# Patient Record
Sex: MALE | Race: WHITE | NOT HISPANIC OR LATINO | URBAN - METROPOLITAN AREA
[De-identification: names, ages, dates, MRNs, and addresses within clinical notes are randomized per-mention and may not be internally consistent; named-entity substitution may affect disease eponyms.]

---

## 2018-01-01 ENCOUNTER — INPATIENT (INPATIENT)
Facility: HOSPITAL | Age: 73
LOS: 11 days | Discharge: HOSPICE | DRG: 377 | End: 2018-10-17
Attending: FAMILY MEDICINE | Admitting: FAMILY MEDICINE
Payer: MEDICARE

## 2018-01-01 ENCOUNTER — EMERGENCY (EMERGENCY)
Facility: HOSPITAL | Age: 73
LOS: 1 days | Discharge: HOME | End: 2018-01-01
Attending: EMERGENCY MEDICINE | Admitting: EMERGENCY MEDICINE
Payer: MEDICARE

## 2018-01-01 VITALS — OXYGEN SATURATION: 90 %

## 2018-01-01 VITALS
OXYGEN SATURATION: 99 % | DIASTOLIC BLOOD PRESSURE: 66 MMHG | RESPIRATION RATE: 18 BRPM | TEMPERATURE: 98 F | HEART RATE: 98 BPM | SYSTOLIC BLOOD PRESSURE: 144 MMHG

## 2018-01-01 VITALS
OXYGEN SATURATION: 94 % | TEMPERATURE: 97 F | DIASTOLIC BLOOD PRESSURE: 71 MMHG | RESPIRATION RATE: 20 BRPM | HEIGHT: 68 IN | SYSTOLIC BLOOD PRESSURE: 133 MMHG | HEART RATE: 83 BPM | WEIGHT: 315 LBS

## 2018-01-01 VITALS
RESPIRATION RATE: 25 BRPM | DIASTOLIC BLOOD PRESSURE: 85 MMHG | OXYGEN SATURATION: 100 % | SYSTOLIC BLOOD PRESSURE: 138 MMHG | TEMPERATURE: 98 F | WEIGHT: 315 LBS | HEIGHT: 67 IN | HEART RATE: 69 BPM

## 2018-01-01 DIAGNOSIS — D61.818 OTHER PANCYTOPENIA: ICD-10-CM

## 2018-01-01 DIAGNOSIS — M05.9 RHEUMATOID ARTHRITIS WITH RHEUMATOID FACTOR, UNSPECIFIED: ICD-10-CM

## 2018-01-01 DIAGNOSIS — K92.2 GASTROINTESTINAL HEMORRHAGE, UNSPECIFIED: ICD-10-CM

## 2018-01-01 DIAGNOSIS — L89.320 PRESSURE ULCER OF LEFT BUTTOCK, UNSTAGEABLE: ICD-10-CM

## 2018-01-01 DIAGNOSIS — I50.9 HEART FAILURE, UNSPECIFIED: ICD-10-CM

## 2018-01-01 DIAGNOSIS — D50.8 OTHER IRON DEFICIENCY ANEMIAS: ICD-10-CM

## 2018-01-01 DIAGNOSIS — J22 UNSPECIFIED ACUTE LOWER RESPIRATORY INFECTION: ICD-10-CM

## 2018-01-01 DIAGNOSIS — N17.9 ACUTE KIDNEY FAILURE, UNSPECIFIED: ICD-10-CM

## 2018-01-01 DIAGNOSIS — D64.9 ANEMIA, UNSPECIFIED: ICD-10-CM

## 2018-01-01 DIAGNOSIS — Z51.5 ENCOUNTER FOR PALLIATIVE CARE: ICD-10-CM

## 2018-01-01 DIAGNOSIS — R06.02 SHORTNESS OF BREATH: ICD-10-CM

## 2018-01-01 DIAGNOSIS — R45.1 RESTLESSNESS AND AGITATION: ICD-10-CM

## 2018-01-01 DIAGNOSIS — J44.9 CHRONIC OBSTRUCTIVE PULMONARY DISEASE, UNSPECIFIED: ICD-10-CM

## 2018-01-01 DIAGNOSIS — G93.40 ENCEPHALOPATHY, UNSPECIFIED: ICD-10-CM

## 2018-01-01 DIAGNOSIS — Z71.89 OTHER SPECIFIED COUNSELING: ICD-10-CM

## 2018-01-01 DIAGNOSIS — G93.41 METABOLIC ENCEPHALOPATHY: ICD-10-CM

## 2018-01-01 DIAGNOSIS — E66.01 MORBID (SEVERE) OBESITY DUE TO EXCESS CALORIES: ICD-10-CM

## 2018-01-01 DIAGNOSIS — R71.0 PRECIPITOUS DROP IN HEMATOCRIT: ICD-10-CM

## 2018-01-01 DIAGNOSIS — I50.32 CHRONIC DIASTOLIC (CONGESTIVE) HEART FAILURE: ICD-10-CM

## 2018-01-01 DIAGNOSIS — N18.3 CHRONIC KIDNEY DISEASE, STAGE 3 (MODERATE): ICD-10-CM

## 2018-01-01 DIAGNOSIS — I48.2 CHRONIC ATRIAL FIBRILLATION: ICD-10-CM

## 2018-01-01 DIAGNOSIS — L89.310 PRESSURE ULCER OF RIGHT BUTTOCK, UNSTAGEABLE: ICD-10-CM

## 2018-01-01 DIAGNOSIS — J18.9 PNEUMONIA, UNSPECIFIED ORGANISM: ICD-10-CM

## 2018-01-01 DIAGNOSIS — K59.1 FUNCTIONAL DIARRHEA: ICD-10-CM

## 2018-01-01 DIAGNOSIS — J96.01 ACUTE RESPIRATORY FAILURE WITH HYPOXIA: ICD-10-CM

## 2018-01-01 DIAGNOSIS — R10.9 UNSPECIFIED ABDOMINAL PAIN: ICD-10-CM

## 2018-01-01 DIAGNOSIS — R19.7 DIARRHEA, UNSPECIFIED: ICD-10-CM

## 2018-01-01 LAB
ALBUMIN SERPL ELPH-MCNC: 2.3 G/DL — LOW (ref 3.3–5)
ALBUMIN SERPL ELPH-MCNC: 2.7 G/DL — LOW (ref 3.3–5)
ALBUMIN SERPL ELPH-MCNC: 3.2 G/DL — LOW (ref 3.3–5)
ALP SERPL-CCNC: 81 U/L — SIGNIFICANT CHANGE UP (ref 40–120)
ALP SERPL-CCNC: 99 U/L — SIGNIFICANT CHANGE UP (ref 30–120)
ALP SERPL-CCNC: 99 U/L — SIGNIFICANT CHANGE UP (ref 40–120)
ALT FLD-CCNC: 17 U/L — SIGNIFICANT CHANGE UP (ref 12–78)
ALT FLD-CCNC: 19 U/L DA — SIGNIFICANT CHANGE UP (ref 10–60)
ALT FLD-CCNC: 27 U/L — SIGNIFICANT CHANGE UP (ref 12–78)
AMMONIA BLD-MCNC: 19 UMOL/L — SIGNIFICANT CHANGE UP (ref 11–32)
AMMONIA BLD-MCNC: 27 UMOL/L — SIGNIFICANT CHANGE UP (ref 11–32)
AMMONIA BLD-MCNC: 30 UMOL/L — SIGNIFICANT CHANGE UP (ref 11–32)
AMMONIA BLD-MCNC: 37 UMOL/L — HIGH (ref 11–32)
ANION GAP SERPL CALC-SCNC: 10 MMOL/L — SIGNIFICANT CHANGE UP (ref 5–17)
ANION GAP SERPL CALC-SCNC: 10 MMOL/L — SIGNIFICANT CHANGE UP (ref 5–17)
ANION GAP SERPL CALC-SCNC: 11 MMOL/L — SIGNIFICANT CHANGE UP (ref 5–17)
ANION GAP SERPL CALC-SCNC: 11 MMOL/L — SIGNIFICANT CHANGE UP (ref 5–17)
ANION GAP SERPL CALC-SCNC: 12 MMOL/L — SIGNIFICANT CHANGE UP (ref 5–17)
ANION GAP SERPL CALC-SCNC: 7 MMOL/L — SIGNIFICANT CHANGE UP (ref 5–17)
ANION GAP SERPL CALC-SCNC: 8 MMOL/L — SIGNIFICANT CHANGE UP (ref 5–17)
ANISOCYTOSIS BLD QL: SLIGHT — SIGNIFICANT CHANGE UP
APPEARANCE UR: ABNORMAL
APPEARANCE UR: CLEAR — SIGNIFICANT CHANGE UP
APTT BLD: 31.9 SEC — SIGNIFICANT CHANGE UP (ref 27.5–37.4)
APTT BLD: 32.4 SEC — SIGNIFICANT CHANGE UP (ref 27.5–37.4)
APTT BLD: 33.5 SEC — SIGNIFICANT CHANGE UP (ref 27.5–37.4)
APTT BLD: 35.3 SEC — SIGNIFICANT CHANGE UP (ref 27.5–37.4)
APTT BLD: 45.9 SEC — HIGH (ref 27.5–37.4)
AST SERPL-CCNC: 18 U/L — SIGNIFICANT CHANGE UP (ref 15–37)
AST SERPL-CCNC: 23 U/L — SIGNIFICANT CHANGE UP (ref 10–40)
AST SERPL-CCNC: 53 U/L — HIGH (ref 15–37)
BACTERIA # UR AUTO: ABNORMAL
BACTERIA # UR AUTO: ABNORMAL
BASE EXCESS BLDA CALC-SCNC: -5.5 MMOL/L — LOW (ref -2–2)
BASE EXCESS BLDA CALC-SCNC: -7.8 MMOL/L — LOW (ref -2–2)
BASOPHILS # BLD AUTO: 0 K/UL — SIGNIFICANT CHANGE UP (ref 0–0.2)
BASOPHILS # BLD AUTO: 0.02 K/UL — SIGNIFICANT CHANGE UP (ref 0–0.2)
BASOPHILS NFR BLD AUTO: 0 % — SIGNIFICANT CHANGE UP (ref 0–2)
BASOPHILS NFR BLD AUTO: 0.2 % — SIGNIFICANT CHANGE UP (ref 0–2)
BILIRUB SERPL-MCNC: 0.6 MG/DL — SIGNIFICANT CHANGE UP (ref 0.2–1.2)
BILIRUB SERPL-MCNC: 0.8 MG/DL — SIGNIFICANT CHANGE UP (ref 0.2–1.2)
BILIRUB SERPL-MCNC: 0.9 MG/DL — SIGNIFICANT CHANGE UP (ref 0.2–1.2)
BILIRUB UR-MCNC: ABNORMAL
BILIRUB UR-MCNC: ABNORMAL
BLD GP AB SCN SERPL QL: SIGNIFICANT CHANGE UP
BLOOD GAS COMMENTS ARTERIAL: SIGNIFICANT CHANGE UP
BUN SERPL-MCNC: 25 MG/DL — HIGH (ref 7–23)
BUN SERPL-MCNC: 26 MG/DL — HIGH (ref 7–23)
BUN SERPL-MCNC: 26 MG/DL — HIGH (ref 7–23)
BUN SERPL-MCNC: 29 MG/DL — HIGH (ref 7–23)
BUN SERPL-MCNC: 30 MG/DL — HIGH (ref 7–23)
BUN SERPL-MCNC: 35 MG/DL — HIGH (ref 7–23)
BUN SERPL-MCNC: 38 MG/DL — HIGH (ref 7–23)
C DIFF BY PCR RESULT: SIGNIFICANT CHANGE UP
C DIFF TOX GENS STL QL NAA+PROBE: SIGNIFICANT CHANGE UP
CALCIUM SERPL-MCNC: 7 MG/DL — LOW (ref 8.5–10.1)
CALCIUM SERPL-MCNC: 7.3 MG/DL — LOW (ref 8.5–10.1)
CALCIUM SERPL-MCNC: 7.6 MG/DL — LOW (ref 8.5–10.1)
CALCIUM SERPL-MCNC: 7.7 MG/DL — LOW (ref 8.5–10.1)
CALCIUM SERPL-MCNC: 7.9 MG/DL — LOW (ref 8.5–10.1)
CALCIUM SERPL-MCNC: 7.9 MG/DL — LOW (ref 8.5–10.1)
CALCIUM SERPL-MCNC: 8.9 MG/DL — SIGNIFICANT CHANGE UP (ref 8.4–10.5)
CHLORIDE SERPL-SCNC: 103 MMOL/L — SIGNIFICANT CHANGE UP (ref 96–108)
CHLORIDE SERPL-SCNC: 111 MMOL/L — HIGH (ref 96–108)
CHLORIDE SERPL-SCNC: 115 MMOL/L — HIGH (ref 96–108)
CHLORIDE SERPL-SCNC: 115 MMOL/L — HIGH (ref 96–108)
CHLORIDE SERPL-SCNC: 117 MMOL/L — HIGH (ref 96–108)
CHLORIDE SERPL-SCNC: 119 MMOL/L — HIGH (ref 96–108)
CHLORIDE SERPL-SCNC: 119 MMOL/L — HIGH (ref 96–108)
CK MB BLD-MCNC: 2 % — SIGNIFICANT CHANGE UP (ref 0–3.5)
CK MB BLD-MCNC: 2.2 % — SIGNIFICANT CHANGE UP (ref 0–3.5)
CK MB BLD-MCNC: 2.6 % — SIGNIFICANT CHANGE UP (ref 0–3.5)
CK MB BLD-MCNC: 3.2 % — SIGNIFICANT CHANGE UP (ref 0–3.5)
CK MB CFR SERPL CALC: 0.6 NG/ML — SIGNIFICANT CHANGE UP (ref 0–3.6)
CK MB CFR SERPL CALC: 1.2 NG/ML — SIGNIFICANT CHANGE UP (ref 0–3.6)
CK MB CFR SERPL CALC: 4.2 NG/ML — HIGH (ref 0–3.6)
CK MB CFR SERPL CALC: 4.4 NG/ML — HIGH (ref 0–3.6)
CK SERPL-CCNC: 130 U/L — SIGNIFICANT CHANGE UP (ref 26–308)
CK SERPL-CCNC: 167 U/L — SIGNIFICANT CHANGE UP (ref 26–308)
CK SERPL-CCNC: 27 U/L — LOW (ref 39–308)
CK SERPL-CCNC: 59 U/L — SIGNIFICANT CHANGE UP (ref 26–308)
CO2 SERPL-SCNC: 17 MMOL/L — LOW (ref 22–31)
CO2 SERPL-SCNC: 18 MMOL/L — LOW (ref 22–31)
CO2 SERPL-SCNC: 19 MMOL/L — LOW (ref 22–31)
CO2 SERPL-SCNC: 19 MMOL/L — LOW (ref 22–31)
CO2 SERPL-SCNC: 22 MMOL/L — SIGNIFICANT CHANGE UP (ref 22–31)
CO2 SERPL-SCNC: 23 MMOL/L — SIGNIFICANT CHANGE UP (ref 22–31)
CO2 SERPL-SCNC: 24 MMOL/L — SIGNIFICANT CHANGE UP (ref 22–31)
COLOR SPEC: YELLOW — SIGNIFICANT CHANGE UP
COLOR SPEC: YELLOW — SIGNIFICANT CHANGE UP
CREAT SERPL-MCNC: 1.1 MG/DL — SIGNIFICANT CHANGE UP (ref 0.5–1.3)
CREAT SERPL-MCNC: 1.3 MG/DL — SIGNIFICANT CHANGE UP (ref 0.5–1.3)
CREAT SERPL-MCNC: 1.37 MG/DL — HIGH (ref 0.5–1.3)
CREAT SERPL-MCNC: 1.4 MG/DL — HIGH (ref 0.5–1.3)
CREAT SERPL-MCNC: 1.6 MG/DL — HIGH (ref 0.5–1.3)
CRP SERPL-MCNC: 7.42 MG/DL — HIGH (ref 0–0.4)
CULTURE RESULTS: NO GROWTH — SIGNIFICANT CHANGE UP
CULTURE RESULTS: NO GROWTH — SIGNIFICANT CHANGE UP
CULTURE RESULTS: SIGNIFICANT CHANGE UP
CULTURE RESULTS: SIGNIFICANT CHANGE UP
D DIMER BLD IA.RAPID-MCNC: 178 NG/ML DDU — SIGNIFICANT CHANGE UP
DIFF PNL FLD: ABNORMAL
DIFF PNL FLD: ABNORMAL
ELLIPTOCYTES BLD QL SMEAR: SLIGHT — SIGNIFICANT CHANGE UP
EOSINOPHIL # BLD AUTO: 0 K/UL — SIGNIFICANT CHANGE UP (ref 0–0.5)
EOSINOPHIL # BLD AUTO: 0 K/UL — SIGNIFICANT CHANGE UP (ref 0–0.5)
EOSINOPHIL # BLD AUTO: 0.04 K/UL — SIGNIFICANT CHANGE UP (ref 0–0.5)
EOSINOPHIL # BLD AUTO: 0.04 K/UL — SIGNIFICANT CHANGE UP (ref 0–0.5)
EOSINOPHIL # BLD AUTO: 0.05 K/UL — SIGNIFICANT CHANGE UP (ref 0–0.5)
EOSINOPHIL NFR BLD AUTO: 0 % — SIGNIFICANT CHANGE UP (ref 0–6)
EOSINOPHIL NFR BLD AUTO: 0 % — SIGNIFICANT CHANGE UP (ref 0–6)
EOSINOPHIL NFR BLD AUTO: 0.6 % — SIGNIFICANT CHANGE UP (ref 0–6)
EOSINOPHIL NFR BLD AUTO: 0.9 % — SIGNIFICANT CHANGE UP (ref 0–6)
EOSINOPHIL NFR BLD AUTO: 1.4 % — SIGNIFICANT CHANGE UP (ref 0–6)
EPI CELLS # UR: SIGNIFICANT CHANGE UP
EPI CELLS # UR: SIGNIFICANT CHANGE UP
FERRITIN SERPL-MCNC: 427 NG/ML — HIGH (ref 30–400)
FOLATE SERPL-MCNC: <2 NG/ML — LOW
GLUCOSE SERPL-MCNC: 123 MG/DL — HIGH (ref 70–99)
GLUCOSE SERPL-MCNC: 135 MG/DL — HIGH (ref 70–99)
GLUCOSE SERPL-MCNC: 140 MG/DL — HIGH (ref 70–99)
GLUCOSE SERPL-MCNC: 148 MG/DL — HIGH (ref 70–99)
GLUCOSE SERPL-MCNC: 151 MG/DL — HIGH (ref 70–99)
GLUCOSE SERPL-MCNC: 161 MG/DL — HIGH (ref 70–99)
GLUCOSE SERPL-MCNC: 185 MG/DL — HIGH (ref 70–99)
GLUCOSE UR QL: NEGATIVE — SIGNIFICANT CHANGE UP
GLUCOSE UR QL: NEGATIVE — SIGNIFICANT CHANGE UP
GRAN CASTS # UR COMP ASSIST: ABNORMAL /LPF
HCO3 BLDA-SCNC: 18 MMOL/L — LOW (ref 23–27)
HCO3 BLDA-SCNC: 20 MMOL/L — LOW (ref 23–27)
HCT VFR BLD CALC: 21.8 % — LOW (ref 39–50)
HCT VFR BLD CALC: 21.8 % — LOW (ref 39–50)
HCT VFR BLD CALC: 22.6 % — LOW (ref 39–50)
HCT VFR BLD CALC: 23.4 % — LOW (ref 39–50)
HCT VFR BLD CALC: 23.8 % — LOW (ref 39–50)
HCT VFR BLD CALC: 24.5 % — LOW (ref 39–50)
HCT VFR BLD CALC: 25 % — LOW (ref 39–50)
HCT VFR BLD CALC: 25.2 % — LOW (ref 39–50)
HCT VFR BLD CALC: 26.3 % — LOW (ref 39–50)
HCT VFR BLD CALC: 29.2 % — LOW (ref 39–50)
HCT VFR BLD CALC: 29.4 % — LOW (ref 39–50)
HCT VFR BLD CALC: 35 % — LOW (ref 39–50)
HGB BLD-MCNC: 11.3 G/DL — LOW (ref 13–17)
HGB BLD-MCNC: 6.9 G/DL — CRITICAL LOW (ref 13–17)
HGB BLD-MCNC: 6.9 G/DL — CRITICAL LOW (ref 13–17)
HGB BLD-MCNC: 7.1 G/DL — LOW (ref 13–17)
HGB BLD-MCNC: 7.3 G/DL — LOW (ref 13–17)
HGB BLD-MCNC: 7.5 G/DL — LOW (ref 13–17)
HGB BLD-MCNC: 7.7 G/DL — LOW (ref 13–17)
HGB BLD-MCNC: 7.9 G/DL — LOW (ref 13–17)
HGB BLD-MCNC: 8.2 G/DL — LOW (ref 13–17)
HGB BLD-MCNC: 8.3 G/DL — LOW (ref 13–17)
HGB BLD-MCNC: 9.3 G/DL — LOW (ref 13–17)
HGB BLD-MCNC: 9.3 G/DL — LOW (ref 13–17)
HOROWITZ INDEX BLDA+IHG-RTO: 40 — SIGNIFICANT CHANGE UP
HOROWITZ INDEX BLDA+IHG-RTO: SIGNIFICANT CHANGE UP
HYALINE CASTS # UR AUTO: ABNORMAL /LPF
HYPOCHROMIA BLD QL: SLIGHT — SIGNIFICANT CHANGE UP
IMM GRANULOCYTES NFR BLD AUTO: 0.7 % — SIGNIFICANT CHANGE UP (ref 0–1.5)
IMM GRANULOCYTES NFR BLD AUTO: 1.1 % — SIGNIFICANT CHANGE UP (ref 0–1.5)
IMM GRANULOCYTES NFR BLD AUTO: 1.4 % — SIGNIFICANT CHANGE UP (ref 0–1.5)
IMM GRANULOCYTES NFR BLD AUTO: 1.8 % — HIGH (ref 0–1.5)
INR BLD: 2.12 RATIO — HIGH (ref 0.88–1.16)
INR BLD: 2.28 RATIO — HIGH (ref 0.88–1.16)
INR BLD: 2.33 RATIO — HIGH (ref 0.88–1.16)
INR BLD: 2.58 RATIO — HIGH (ref 0.88–1.16)
INR BLD: 2.69 RATIO — HIGH (ref 0.88–1.16)
INR BLD: 3.23 RATIO — HIGH (ref 0.88–1.16)
IRON SATN MFR SERPL: 135 UG/DL — SIGNIFICANT CHANGE UP (ref 45–165)
IRON SATN MFR SERPL: 46 % — SIGNIFICANT CHANGE UP (ref 16–55)
KETONES UR-MCNC: ABNORMAL
KETONES UR-MCNC: ABNORMAL
LACTATE SERPL-SCNC: 1.6 MMOL/L — SIGNIFICANT CHANGE UP (ref 0.7–2)
LACTATE SERPL-SCNC: 2.3 MMOL/L — HIGH (ref 0.7–2)
LEUKOCYTE ESTERASE UR-ACNC: ABNORMAL
LEUKOCYTE ESTERASE UR-ACNC: ABNORMAL
LG PLATELETS BLD QL AUTO: SLIGHT — SIGNIFICANT CHANGE UP
LIDOCAIN IGE QN: 64 U/L — LOW (ref 73–393)
LYMPHOCYTES # BLD AUTO: 0.47 K/UL — LOW (ref 1–3.3)
LYMPHOCYTES # BLD AUTO: 0.49 K/UL — LOW (ref 1–3.3)
LYMPHOCYTES # BLD AUTO: 0.57 K/UL — LOW (ref 1–3.3)
LYMPHOCYTES # BLD AUTO: 0.57 K/UL — LOW (ref 1–3.3)
LYMPHOCYTES # BLD AUTO: 0.88 K/UL — LOW (ref 1–3.3)
LYMPHOCYTES # BLD AUTO: 10 % — LOW (ref 13–44)
LYMPHOCYTES # BLD AUTO: 10.8 % — LOW (ref 13–44)
LYMPHOCYTES # BLD AUTO: 16.4 % — SIGNIFICANT CHANGE UP (ref 13–44)
LYMPHOCYTES # BLD AUTO: 4.7 % — LOW (ref 13–44)
LYMPHOCYTES # BLD AUTO: 8.8 % — LOW (ref 13–44)
MACROCYTES BLD QL: SLIGHT — SIGNIFICANT CHANGE UP
MAGNESIUM SERPL-MCNC: 1.9 MG/DL — SIGNIFICANT CHANGE UP (ref 1.6–2.6)
MAGNESIUM SERPL-MCNC: 2.1 MG/DL — SIGNIFICANT CHANGE UP (ref 1.6–2.6)
MANUAL SMEAR VERIFICATION: SIGNIFICANT CHANGE UP
MCHC RBC-ENTMCNC: 28.2 PG — SIGNIFICANT CHANGE UP (ref 27–34)
MCHC RBC-ENTMCNC: 28.2 PG — SIGNIFICANT CHANGE UP (ref 27–34)
MCHC RBC-ENTMCNC: 28.5 PG — SIGNIFICANT CHANGE UP (ref 27–34)
MCHC RBC-ENTMCNC: 28.6 PG — SIGNIFICANT CHANGE UP (ref 27–34)
MCHC RBC-ENTMCNC: 28.9 PG — SIGNIFICANT CHANGE UP (ref 27–34)
MCHC RBC-ENTMCNC: 29.2 PG — SIGNIFICANT CHANGE UP (ref 27–34)
MCHC RBC-ENTMCNC: 29.3 PG — SIGNIFICANT CHANGE UP (ref 27–34)
MCHC RBC-ENTMCNC: 31.4 GM/DL — LOW (ref 32–36)
MCHC RBC-ENTMCNC: 31.6 GM/DL — LOW (ref 32–36)
MCHC RBC-ENTMCNC: 31.6 GM/DL — LOW (ref 32–36)
MCHC RBC-ENTMCNC: 31.7 GM/DL — LOW (ref 32–36)
MCHC RBC-ENTMCNC: 31.8 GM/DL — LOW (ref 32–36)
MCHC RBC-ENTMCNC: 32.3 GM/DL — SIGNIFICANT CHANGE UP (ref 32–36)
MCHC RBC-ENTMCNC: 32.5 GM/DL — SIGNIFICANT CHANGE UP (ref 32–36)
MCV RBC AUTO: 88.5 FL — SIGNIFICANT CHANGE UP (ref 80–100)
MCV RBC AUTO: 89.1 FL — SIGNIFICANT CHANGE UP (ref 80–100)
MCV RBC AUTO: 89.5 FL — SIGNIFICANT CHANGE UP (ref 80–100)
MCV RBC AUTO: 89.7 FL — SIGNIFICANT CHANGE UP (ref 80–100)
MCV RBC AUTO: 90.3 FL — SIGNIFICANT CHANGE UP (ref 80–100)
MCV RBC AUTO: 90.5 FL — SIGNIFICANT CHANGE UP (ref 80–100)
MCV RBC AUTO: 93.2 FL — SIGNIFICANT CHANGE UP (ref 80–100)
MONOCYTES # BLD AUTO: 0.11 K/UL — SIGNIFICANT CHANGE UP (ref 0–0.9)
MONOCYTES # BLD AUTO: 0.11 K/UL — SIGNIFICANT CHANGE UP (ref 0–0.9)
MONOCYTES # BLD AUTO: 0.12 K/UL — SIGNIFICANT CHANGE UP (ref 0–0.9)
MONOCYTES # BLD AUTO: 0.14 K/UL — SIGNIFICANT CHANGE UP (ref 0–0.9)
MONOCYTES # BLD AUTO: 0.35 K/UL — SIGNIFICANT CHANGE UP (ref 0–0.9)
MONOCYTES NFR BLD AUTO: 1.2 % — LOW (ref 2–14)
MONOCYTES NFR BLD AUTO: 1.7 % — LOW (ref 2–14)
MONOCYTES NFR BLD AUTO: 2.5 % — SIGNIFICANT CHANGE UP (ref 2–14)
MONOCYTES NFR BLD AUTO: 4 % — SIGNIFICANT CHANGE UP (ref 2–14)
MONOCYTES NFR BLD AUTO: 4 % — SIGNIFICANT CHANGE UP (ref 2–14)
MRSA PCR RESULT.: SIGNIFICANT CHANGE UP
NEUTROPHILS # BLD AUTO: 2.66 K/UL — SIGNIFICANT CHANGE UP (ref 1.8–7.4)
NEUTROPHILS # BLD AUTO: 3.67 K/UL — SIGNIFICANT CHANGE UP (ref 1.8–7.4)
NEUTROPHILS # BLD AUTO: 5.71 K/UL — SIGNIFICANT CHANGE UP (ref 1.8–7.4)
NEUTROPHILS # BLD AUTO: 7.57 K/UL — HIGH (ref 1.8–7.4)
NEUTROPHILS # BLD AUTO: 9.66 K/UL — HIGH (ref 1.8–7.4)
NEUTROPHILS NFR BLD AUTO: 76.8 % — SIGNIFICANT CHANGE UP (ref 43–77)
NEUTROPHILS NFR BLD AUTO: 84 % — HIGH (ref 43–77)
NEUTROPHILS NFR BLD AUTO: 86 % — HIGH (ref 43–77)
NEUTROPHILS NFR BLD AUTO: 87.8 % — HIGH (ref 43–77)
NEUTROPHILS NFR BLD AUTO: 93.2 % — HIGH (ref 43–77)
NEUTS HYPERSEG # BLD: SIGNIFICANT CHANGE UP
NITRITE UR-MCNC: NEGATIVE — SIGNIFICANT CHANGE UP
NITRITE UR-MCNC: NEGATIVE — SIGNIFICANT CHANGE UP
NRBC # BLD: 0 /100 WBCS — SIGNIFICANT CHANGE UP (ref 0–0)
NRBC # BLD: 1 /100 WBCS — HIGH (ref 0–0)
NRBC # BLD: 3 — SIGNIFICANT CHANGE UP
NRBC # BLD: SIGNIFICANT CHANGE UP /100 WBCS (ref 0–0)
NT-PROBNP SERPL-SCNC: HIGH PG/ML (ref 0–125)
OB PNL STL: NEGATIVE — SIGNIFICANT CHANGE UP
OB PNL STL: POSITIVE
PCO2 BLDA: 28 MMHG — LOW (ref 32–46)
PCO2 BLDA: 37 MMHG — SIGNIFICANT CHANGE UP (ref 32–46)
PH BLDA: 7.34 — LOW (ref 7.35–7.45)
PH BLDA: 7.38 — SIGNIFICANT CHANGE UP (ref 7.35–7.45)
PH UR: 5 — SIGNIFICANT CHANGE UP (ref 5–8)
PH UR: 5 — SIGNIFICANT CHANGE UP (ref 5–8)
PLAT MORPH BLD: NORMAL — SIGNIFICANT CHANGE UP
PLATELET # BLD AUTO: 106 K/UL — LOW (ref 150–400)
PLATELET # BLD AUTO: 126 K/UL — LOW (ref 150–400)
PLATELET # BLD AUTO: 182 K/UL — SIGNIFICANT CHANGE UP (ref 150–400)
PLATELET # BLD AUTO: 212 K/UL — SIGNIFICANT CHANGE UP (ref 150–400)
PLATELET # BLD AUTO: 232 K/UL — SIGNIFICANT CHANGE UP (ref 150–400)
PLATELET # BLD AUTO: 84 K/UL — LOW (ref 150–400)
PLATELET # BLD AUTO: 95 K/UL — LOW (ref 150–400)
PO2 BLDA: 77 MMHG — SIGNIFICANT CHANGE UP (ref 74–108)
PO2 BLDA: 89 MMHG — SIGNIFICANT CHANGE UP (ref 74–108)
POIKILOCYTOSIS BLD QL AUTO: SLIGHT — SIGNIFICANT CHANGE UP
POTASSIUM SERPL-MCNC: 3.9 MMOL/L — SIGNIFICANT CHANGE UP (ref 3.5–5.3)
POTASSIUM SERPL-MCNC: 4.1 MMOL/L — SIGNIFICANT CHANGE UP (ref 3.5–5.3)
POTASSIUM SERPL-MCNC: 4.4 MMOL/L — SIGNIFICANT CHANGE UP (ref 3.5–5.3)
POTASSIUM SERPL-MCNC: 4.7 MMOL/L — SIGNIFICANT CHANGE UP (ref 3.5–5.3)
POTASSIUM SERPL-MCNC: 5.2 MMOL/L — SIGNIFICANT CHANGE UP (ref 3.5–5.3)
POTASSIUM SERPL-SCNC: 3.9 MMOL/L — SIGNIFICANT CHANGE UP (ref 3.5–5.3)
POTASSIUM SERPL-SCNC: 4.1 MMOL/L — SIGNIFICANT CHANGE UP (ref 3.5–5.3)
POTASSIUM SERPL-SCNC: 4.4 MMOL/L — SIGNIFICANT CHANGE UP (ref 3.5–5.3)
POTASSIUM SERPL-SCNC: 4.7 MMOL/L — SIGNIFICANT CHANGE UP (ref 3.5–5.3)
POTASSIUM SERPL-SCNC: 5.2 MMOL/L — SIGNIFICANT CHANGE UP (ref 3.5–5.3)
PROT SERPL-MCNC: 6.2 G/DL — SIGNIFICANT CHANGE UP (ref 6–8.3)
PROT SERPL-MCNC: 6.9 G/DL — SIGNIFICANT CHANGE UP (ref 6–8.3)
PROT SERPL-MCNC: 7.3 G/DL — SIGNIFICANT CHANGE UP (ref 6–8.3)
PROT UR-MCNC: 25 MG/DL
PROT UR-MCNC: 75 MG/DL
PROTHROM AB SERPL-ACNC: 23.5 SEC — HIGH (ref 9.8–12.7)
PROTHROM AB SERPL-ACNC: 25.3 SEC — HIGH (ref 9.8–12.7)
PROTHROM AB SERPL-ACNC: 25.8 SEC — HIGH (ref 9.8–12.7)
PROTHROM AB SERPL-ACNC: 28.7 SEC — HIGH (ref 9.8–12.7)
PROTHROM AB SERPL-ACNC: 29.9 SEC — HIGH (ref 9.8–12.7)
PROTHROM AB SERPL-ACNC: 36.1 SEC — HIGH (ref 9.8–12.7)
RBC # BLD: 2.41 M/UL — LOW (ref 4.2–5.8)
RBC # BLD: 2.63 M/UL — LOW (ref 4.2–5.8)
RBC # BLD: 2.77 M/UL — LOW (ref 4.2–5.8)
RBC # BLD: 2.81 M/UL — LOW (ref 4.2–5.8)
RBC # BLD: 3.3 M/UL — LOW (ref 4.2–5.8)
RBC # BLD: 3.3 M/UL — LOW (ref 4.2–5.8)
RBC # BLD: 3.91 M/UL — LOW (ref 4.2–5.8)
RBC # FLD: 19.9 % — HIGH (ref 10.3–14.5)
RBC # FLD: 20.5 % — HIGH (ref 10.3–14.5)
RBC # FLD: 20.6 % — HIGH (ref 10.3–14.5)
RBC # FLD: 21.3 % — HIGH (ref 10.3–14.5)
RBC # FLD: 21.3 % — HIGH (ref 10.3–14.5)
RBC # FLD: 21.4 % — HIGH (ref 10.3–14.5)
RBC # FLD: 21.8 % — HIGH (ref 10.3–14.5)
RBC BLD AUTO: ABNORMAL
RBC CASTS # UR COMP ASSIST: ABNORMAL /HPF (ref 0–4)
RBC CASTS # UR COMP ASSIST: ABNORMAL /HPF (ref 0–4)
S AUREUS DNA NOSE QL NAA+PROBE: DETECTED
SAO2 % BLDA: 94 % — SIGNIFICANT CHANGE UP (ref 92–96)
SAO2 % BLDA: 97 % — HIGH (ref 92–96)
SODIUM SERPL-SCNC: 138 MMOL/L — SIGNIFICANT CHANGE UP (ref 135–145)
SODIUM SERPL-SCNC: 140 MMOL/L — SIGNIFICANT CHANGE UP (ref 135–145)
SODIUM SERPL-SCNC: 144 MMOL/L — SIGNIFICANT CHANGE UP (ref 135–145)
SODIUM SERPL-SCNC: 144 MMOL/L — SIGNIFICANT CHANGE UP (ref 135–145)
SODIUM SERPL-SCNC: 146 MMOL/L — HIGH (ref 135–145)
SODIUM SERPL-SCNC: 149 MMOL/L — HIGH (ref 135–145)
SODIUM SERPL-SCNC: 149 MMOL/L — HIGH (ref 135–145)
SP GR SPEC: 1.01 — SIGNIFICANT CHANGE UP (ref 1.01–1.02)
SP GR SPEC: 1.01 — SIGNIFICANT CHANGE UP (ref 1.01–1.02)
SPECIMEN SOURCE: SIGNIFICANT CHANGE UP
T3 SERPL-MCNC: 44 NG/DL — LOW (ref 80–200)
T4 AB SER-ACNC: 5 UG/DL — SIGNIFICANT CHANGE UP (ref 4.6–12)
TIBC SERPL-MCNC: 296 UG/DL — SIGNIFICANT CHANGE UP (ref 220–430)
TROPONIN I SERPL-MCNC: 0 NG/ML — LOW (ref 0.02–0.06)
TROPONIN I SERPL-MCNC: <.015 NG/ML — SIGNIFICANT CHANGE UP (ref 0.01–0.04)
TSH SERPL-MCNC: 3.1 UIU/ML — SIGNIFICANT CHANGE UP (ref 0.36–3.74)
TSH SERPL-MCNC: 3.27 UIU/ML — SIGNIFICANT CHANGE UP (ref 0.36–3.74)
UIBC SERPL-MCNC: 161 UG/DL — SIGNIFICANT CHANGE UP (ref 110–370)
UROBILINOGEN FLD QL: NEGATIVE — SIGNIFICANT CHANGE UP
UROBILINOGEN FLD QL: NEGATIVE — SIGNIFICANT CHANGE UP
VIT B12 SERPL-MCNC: 198 PG/ML — LOW (ref 232–1245)
WBC # BLD: 10.36 K/UL — SIGNIFICANT CHANGE UP (ref 3.8–10.5)
WBC # BLD: 3.47 K/UL — LOW (ref 3.8–10.5)
WBC # BLD: 4.37 K/UL — SIGNIFICANT CHANGE UP (ref 3.8–10.5)
WBC # BLD: 4.47 K/UL — SIGNIFICANT CHANGE UP (ref 3.8–10.5)
WBC # BLD: 6.5 K/UL — SIGNIFICANT CHANGE UP (ref 3.8–10.5)
WBC # BLD: 8.62 K/UL — SIGNIFICANT CHANGE UP (ref 3.8–10.5)
WBC # BLD: 8.8 K/UL — SIGNIFICANT CHANGE UP (ref 3.8–10.5)
WBC # FLD AUTO: 10.36 K/UL — SIGNIFICANT CHANGE UP (ref 3.8–10.5)
WBC # FLD AUTO: 3.47 K/UL — LOW (ref 3.8–10.5)
WBC # FLD AUTO: 4.37 K/UL — SIGNIFICANT CHANGE UP (ref 3.8–10.5)
WBC # FLD AUTO: 4.47 K/UL — SIGNIFICANT CHANGE UP (ref 3.8–10.5)
WBC # FLD AUTO: 6.5 K/UL — SIGNIFICANT CHANGE UP (ref 3.8–10.5)
WBC # FLD AUTO: 8.62 K/UL — SIGNIFICANT CHANGE UP (ref 3.8–10.5)
WBC # FLD AUTO: 8.8 K/UL — SIGNIFICANT CHANGE UP (ref 3.8–10.5)
WBC UR QL: SIGNIFICANT CHANGE UP
WBC UR QL: SIGNIFICANT CHANGE UP

## 2018-01-01 PROCEDURE — 99232 SBSQ HOSP IP/OBS MODERATE 35: CPT

## 2018-01-01 PROCEDURE — 93010 ELECTROCARDIOGRAM REPORT: CPT

## 2018-01-01 PROCEDURE — 85730 THROMBOPLASTIN TIME PARTIAL: CPT

## 2018-01-01 PROCEDURE — 82272 OCCULT BLD FECES 1-3 TESTS: CPT

## 2018-01-01 PROCEDURE — 84484 ASSAY OF TROPONIN QUANT: CPT

## 2018-01-01 PROCEDURE — 84436 ASSAY OF TOTAL THYROXINE: CPT

## 2018-01-01 PROCEDURE — 81001 URINALYSIS AUTO W/SCOPE: CPT

## 2018-01-01 PROCEDURE — P9016: CPT

## 2018-01-01 PROCEDURE — 82728 ASSAY OF FERRITIN: CPT

## 2018-01-01 PROCEDURE — 99285 EMERGENCY DEPT VISIT HI MDM: CPT

## 2018-01-01 PROCEDURE — 99284 EMERGENCY DEPT VISIT MOD MDM: CPT | Mod: 25

## 2018-01-01 PROCEDURE — 82553 CREATINE MB FRACTION: CPT

## 2018-01-01 PROCEDURE — 70450 CT HEAD/BRAIN W/O DYE: CPT | Mod: 26

## 2018-01-01 PROCEDURE — 83735 ASSAY OF MAGNESIUM: CPT

## 2018-01-01 PROCEDURE — 36430 TRANSFUSION BLD/BLD COMPNT: CPT

## 2018-01-01 PROCEDURE — 93306 TTE W/DOPPLER COMPLETE: CPT

## 2018-01-01 PROCEDURE — 82140 ASSAY OF AMMONIA: CPT

## 2018-01-01 PROCEDURE — 85027 COMPLETE CBC AUTOMATED: CPT

## 2018-01-01 PROCEDURE — 83550 IRON BINDING TEST: CPT

## 2018-01-01 PROCEDURE — 82607 VITAMIN B-12: CPT

## 2018-01-01 PROCEDURE — 96374 THER/PROPH/DIAG INJ IV PUSH: CPT

## 2018-01-01 PROCEDURE — 85610 PROTHROMBIN TIME: CPT

## 2018-01-01 PROCEDURE — 94760 N-INVAS EAR/PLS OXIMETRY 1: CPT

## 2018-01-01 PROCEDURE — 96375 TX/PRO/DX INJ NEW DRUG ADDON: CPT | Mod: XU

## 2018-01-01 PROCEDURE — 84100 ASSAY OF PHOSPHORUS: CPT

## 2018-01-01 PROCEDURE — 94640 AIRWAY INHALATION TREATMENT: CPT

## 2018-01-01 PROCEDURE — 84145 PROCALCITONIN (PCT): CPT

## 2018-01-01 PROCEDURE — 74176 CT ABD & PELVIS W/O CONTRAST: CPT

## 2018-01-01 PROCEDURE — 36415 COLL VENOUS BLD VENIPUNCTURE: CPT

## 2018-01-01 PROCEDURE — 86901 BLOOD TYPING SEROLOGIC RH(D): CPT

## 2018-01-01 PROCEDURE — 93005 ELECTROCARDIOGRAM TRACING: CPT

## 2018-01-01 PROCEDURE — 87640 STAPH A DNA AMP PROBE: CPT

## 2018-01-01 PROCEDURE — 93970 EXTREMITY STUDY: CPT

## 2018-01-01 PROCEDURE — 76705 ECHO EXAM OF ABDOMEN: CPT | Mod: 26

## 2018-01-01 PROCEDURE — 96361 HYDRATE IV INFUSION ADD-ON: CPT

## 2018-01-01 PROCEDURE — 71045 X-RAY EXAM CHEST 1 VIEW: CPT

## 2018-01-01 PROCEDURE — 86850 RBC ANTIBODY SCREEN: CPT

## 2018-01-01 PROCEDURE — 84443 ASSAY THYROID STIM HORMONE: CPT

## 2018-01-01 PROCEDURE — 71275 CT ANGIOGRAPHY CHEST: CPT | Mod: 26

## 2018-01-01 PROCEDURE — 87493 C DIFF AMPLIFIED PROBE: CPT

## 2018-01-01 PROCEDURE — 71045 X-RAY EXAM CHEST 1 VIEW: CPT | Mod: 26

## 2018-01-01 PROCEDURE — 99285 EMERGENCY DEPT VISIT HI MDM: CPT | Mod: 25

## 2018-01-01 PROCEDURE — 83605 ASSAY OF LACTIC ACID: CPT

## 2018-01-01 PROCEDURE — 99221 1ST HOSP IP/OBS SF/LOW 40: CPT

## 2018-01-01 PROCEDURE — 80053 COMPREHEN METABOLIC PANEL: CPT

## 2018-01-01 PROCEDURE — 85379 FIBRIN DEGRADATION QUANT: CPT

## 2018-01-01 PROCEDURE — 80048 BASIC METABOLIC PNL TOTAL CA: CPT

## 2018-01-01 PROCEDURE — 76705 ECHO EXAM OF ABDOMEN: CPT

## 2018-01-01 PROCEDURE — 82803 BLOOD GASES ANY COMBINATION: CPT

## 2018-01-01 PROCEDURE — 70450 CT HEAD/BRAIN W/O DYE: CPT

## 2018-01-01 PROCEDURE — 99231 SBSQ HOSP IP/OBS SF/LOW 25: CPT

## 2018-01-01 PROCEDURE — 99223 1ST HOSP IP/OBS HIGH 75: CPT

## 2018-01-01 PROCEDURE — 85014 HEMATOCRIT: CPT

## 2018-01-01 PROCEDURE — 85018 HEMOGLOBIN: CPT

## 2018-01-01 PROCEDURE — 93306 TTE W/DOPPLER COMPLETE: CPT | Mod: 26

## 2018-01-01 PROCEDURE — 84480 ASSAY TRIIODOTHYRONINE (T3): CPT

## 2018-01-01 PROCEDURE — 82550 ASSAY OF CK (CPK): CPT

## 2018-01-01 PROCEDURE — 74177 CT ABD & PELVIS W/CONTRAST: CPT | Mod: 26

## 2018-01-01 PROCEDURE — 86900 BLOOD TYPING SEROLOGIC ABO: CPT

## 2018-01-01 PROCEDURE — 99284 EMERGENCY DEPT VISIT MOD MDM: CPT

## 2018-01-01 PROCEDURE — 71275 CT ANGIOGRAPHY CHEST: CPT

## 2018-01-01 PROCEDURE — 87641 MR-STAPH DNA AMP PROBE: CPT

## 2018-01-01 PROCEDURE — 74176 CT ABD & PELVIS W/O CONTRAST: CPT | Mod: 26

## 2018-01-01 PROCEDURE — 93970 EXTREMITY STUDY: CPT | Mod: 26

## 2018-01-01 PROCEDURE — 87040 BLOOD CULTURE FOR BACTERIA: CPT

## 2018-01-01 PROCEDURE — 82962 GLUCOSE BLOOD TEST: CPT

## 2018-01-01 PROCEDURE — 86140 C-REACTIVE PROTEIN: CPT

## 2018-01-01 PROCEDURE — 83690 ASSAY OF LIPASE: CPT

## 2018-01-01 PROCEDURE — 83880 ASSAY OF NATRIURETIC PEPTIDE: CPT

## 2018-01-01 PROCEDURE — 87086 URINE CULTURE/COLONY COUNT: CPT

## 2018-01-01 PROCEDURE — 86923 COMPATIBILITY TEST ELECTRIC: CPT

## 2018-01-01 PROCEDURE — 82746 ASSAY OF FOLIC ACID SERUM: CPT

## 2018-01-01 PROCEDURE — 74177 CT ABD & PELVIS W/CONTRAST: CPT

## 2018-01-01 PROCEDURE — 96375 TX/PRO/DX INJ NEW DRUG ADDON: CPT

## 2018-01-01 RX ORDER — SODIUM CHLORIDE 9 MG/ML
1000 INJECTION, SOLUTION INTRAVENOUS
Qty: 0 | Refills: 0 | Status: DISCONTINUED | OUTPATIENT
Start: 2018-01-01 | End: 2018-01-01

## 2018-01-01 RX ORDER — TRAMADOL HYDROCHLORIDE 50 MG/1
25 TABLET ORAL EVERY 6 HOURS
Qty: 0 | Refills: 0 | Status: DISCONTINUED | OUTPATIENT
Start: 2018-01-01 | End: 2018-01-01

## 2018-01-01 RX ORDER — SODIUM CHLORIDE 9 MG/ML
1000 INJECTION INTRAMUSCULAR; INTRAVENOUS; SUBCUTANEOUS ONCE
Qty: 0 | Refills: 0 | Status: COMPLETED | OUTPATIENT
Start: 2018-01-01 | End: 2018-01-01

## 2018-01-01 RX ORDER — CHOLESTYRAMINE 4 G/9G
4 POWDER, FOR SUSPENSION ORAL DAILY
Qty: 0 | Refills: 0 | Status: DISCONTINUED | OUTPATIENT
Start: 2018-01-01 | End: 2018-01-01

## 2018-01-01 RX ORDER — PREGABALIN 225 MG/1
1000 CAPSULE ORAL DAILY
Qty: 0 | Refills: 0 | Status: DISCONTINUED | OUTPATIENT
Start: 2018-01-01 | End: 2018-01-01

## 2018-01-01 RX ORDER — SODIUM CHLORIDE 9 MG/ML
250 INJECTION INTRAMUSCULAR; INTRAVENOUS; SUBCUTANEOUS ONCE
Qty: 0 | Refills: 0 | Status: COMPLETED | OUTPATIENT
Start: 2018-01-01 | End: 2018-01-01

## 2018-01-01 RX ORDER — HYDROMORPHONE HYDROCHLORIDE 2 MG/ML
1 INJECTION INTRAMUSCULAR; INTRAVENOUS; SUBCUTANEOUS
Qty: 0 | Refills: 0 | Status: DISCONTINUED | OUTPATIENT
Start: 2018-01-01 | End: 2018-01-01

## 2018-01-01 RX ORDER — TRAMADOL HYDROCHLORIDE 50 MG/1
0 TABLET ORAL
Qty: 0 | Refills: 0 | COMMUNITY

## 2018-01-01 RX ORDER — POTASSIUM CHLORIDE 20 MEQ
10 PACKET (EA) ORAL DAILY
Qty: 0 | Refills: 0 | Status: DISCONTINUED | OUTPATIENT
Start: 2018-01-01 | End: 2018-01-01

## 2018-01-01 RX ORDER — ACETAMINOPHEN 500 MG
1000 TABLET ORAL ONCE
Qty: 0 | Refills: 0 | Status: COMPLETED | OUTPATIENT
Start: 2018-01-01 | End: 2018-01-01

## 2018-01-01 RX ORDER — FENTANYL CITRATE 50 UG/ML
1 INJECTION INTRAVENOUS
Qty: 0 | Refills: 0 | Status: DISCONTINUED | OUTPATIENT
Start: 2018-01-01 | End: 2018-01-01

## 2018-01-01 RX ORDER — QUETIAPINE FUMARATE 200 MG/1
100 TABLET, FILM COATED ORAL DAILY
Qty: 0 | Refills: 0 | Status: DISCONTINUED | OUTPATIENT
Start: 2018-01-01 | End: 2018-01-01

## 2018-01-01 RX ORDER — METOPROLOL TARTRATE 50 MG
5 TABLET ORAL ONCE
Qty: 0 | Refills: 0 | Status: COMPLETED | OUTPATIENT
Start: 2018-01-01 | End: 2018-01-01

## 2018-01-01 RX ORDER — MORPHINE SULFATE 50 MG/1
2 CAPSULE, EXTENDED RELEASE ORAL EVERY 4 HOURS
Qty: 0 | Refills: 0 | Status: DISCONTINUED | OUTPATIENT
Start: 2018-01-01 | End: 2018-01-01

## 2018-01-01 RX ORDER — QUETIAPINE FUMARATE 200 MG/1
0 TABLET, FILM COATED ORAL
Qty: 0 | Refills: 0 | COMMUNITY

## 2018-01-01 RX ORDER — VANCOMYCIN HCL 1 G
VIAL (EA) INTRAVENOUS
Qty: 0 | Refills: 0 | Status: DISCONTINUED | OUTPATIENT
Start: 2018-01-01 | End: 2018-01-01

## 2018-01-01 RX ORDER — GUAIFENESIN/DM/PSEUDOEPHEDRINE 595-32-48
5 TABLET, EXTENDED RELEASE 12 HR ORAL
Qty: 0 | Refills: 0 | COMMUNITY

## 2018-01-01 RX ORDER — MORPHINE SULFATE 50 MG/1
2 CAPSULE, EXTENDED RELEASE ORAL
Qty: 100 | Refills: 0 | Status: DISCONTINUED | OUTPATIENT
Start: 2018-01-01 | End: 2018-01-01

## 2018-01-01 RX ORDER — HYDRALAZINE HCL 50 MG
0 TABLET ORAL
Qty: 0 | Refills: 0 | COMMUNITY

## 2018-01-01 RX ORDER — AZTREONAM 2 G
1000 VIAL (EA) INJECTION ONCE
Qty: 0 | Refills: 0 | Status: COMPLETED | OUTPATIENT
Start: 2018-01-01 | End: 2018-01-01

## 2018-01-01 RX ORDER — ONDANSETRON 8 MG/1
0 TABLET, FILM COATED ORAL
Qty: 0 | Refills: 0 | COMMUNITY
Start: 2018-01-01

## 2018-01-01 RX ORDER — ATROPINE SULFATE 1 %
2 DROPS OPHTHALMIC (EYE)
Qty: 0 | Refills: 0 | Status: DISCONTINUED | OUTPATIENT
Start: 2018-01-01 | End: 2018-01-01

## 2018-01-01 RX ORDER — ONDANSETRON 8 MG/1
4 TABLET, FILM COATED ORAL EVERY 6 HOURS
Qty: 0 | Refills: 0 | Status: DISCONTINUED | OUTPATIENT
Start: 2018-01-01 | End: 2018-01-01

## 2018-01-01 RX ORDER — FAMOTIDINE 10 MG/ML
20 INJECTION INTRAVENOUS
Qty: 0 | Refills: 0 | Status: DISCONTINUED | OUTPATIENT
Start: 2018-01-01 | End: 2018-01-01

## 2018-01-01 RX ORDER — METHOCARBAMOL 500 MG/1
500 TABLET, FILM COATED ORAL
Qty: 0 | Refills: 0 | Status: DISCONTINUED | OUTPATIENT
Start: 2018-01-01 | End: 2018-01-01

## 2018-01-01 RX ORDER — AZITHROMYCIN 500 MG/1
0 TABLET, FILM COATED ORAL
Qty: 0 | Refills: 0 | COMMUNITY

## 2018-01-01 RX ORDER — PANTOPRAZOLE SODIUM 20 MG/1
40 TABLET, DELAYED RELEASE ORAL ONCE
Qty: 0 | Refills: 0 | Status: COMPLETED | OUTPATIENT
Start: 2018-01-01 | End: 2018-01-01

## 2018-01-01 RX ORDER — ACETAMINOPHEN 500 MG
650 TABLET ORAL
Qty: 0 | Refills: 0 | Status: DISCONTINUED | OUTPATIENT
Start: 2018-01-01 | End: 2018-01-01

## 2018-01-01 RX ORDER — METRONIDAZOLE 500 MG
500 TABLET ORAL EVERY 8 HOURS
Qty: 0 | Refills: 0 | Status: DISCONTINUED | OUTPATIENT
Start: 2018-01-01 | End: 2018-01-01

## 2018-01-01 RX ORDER — GABAPENTIN 400 MG/1
600 CAPSULE ORAL THREE TIMES A DAY
Qty: 0 | Refills: 0 | Status: DISCONTINUED | OUTPATIENT
Start: 2018-01-01 | End: 2018-01-01

## 2018-01-01 RX ORDER — AZTREONAM 2 G
1000 VIAL (EA) INJECTION EVERY 6 HOURS
Qty: 0 | Refills: 0 | Status: DISCONTINUED | OUTPATIENT
Start: 2018-01-01 | End: 2018-01-01

## 2018-01-01 RX ORDER — POTASSIUM CHLORIDE 20 MEQ
10 PACKET (EA) ORAL
Qty: 0 | Refills: 0 | Status: DISCONTINUED | OUTPATIENT
Start: 2018-01-01 | End: 2018-01-01

## 2018-01-01 RX ORDER — METHOCARBAMOL 500 MG/1
0 TABLET, FILM COATED ORAL
Qty: 0 | Refills: 0 | COMMUNITY

## 2018-01-01 RX ORDER — ACETAMINOPHEN 500 MG
650 TABLET ORAL EVERY 6 HOURS
Qty: 0 | Refills: 0 | Status: DISCONTINUED | OUTPATIENT
Start: 2018-01-01 | End: 2018-01-01

## 2018-01-01 RX ORDER — OLANZAPINE 15 MG/1
10 TABLET, FILM COATED ORAL EVERY 6 HOURS
Qty: 0 | Refills: 0 | Status: DISCONTINUED | OUTPATIENT
Start: 2018-01-01 | End: 2018-01-01

## 2018-01-01 RX ORDER — FOLIC ACID 0.8 MG
1 TABLET ORAL DAILY
Qty: 0 | Refills: 0 | Status: DISCONTINUED | OUTPATIENT
Start: 2018-01-01 | End: 2018-01-01

## 2018-01-01 RX ORDER — LOPERAMIDE HCL 2 MG
0 TABLET ORAL
Qty: 0 | Refills: 0 | COMMUNITY

## 2018-01-01 RX ORDER — LACTOBACILLUS ACIDOPHILUS 100MM CELL
100 CAPSULE ORAL
Qty: 0 | Refills: 0 | COMMUNITY

## 2018-01-01 RX ORDER — NYSTATIN CREAM 100000 [USP'U]/G
1 CREAM TOPICAL
Qty: 0 | Refills: 0 | Status: DISCONTINUED | OUTPATIENT
Start: 2018-01-01 | End: 2018-01-01

## 2018-01-01 RX ORDER — LOPERAMIDE HCL/SIMETHICONE 2-125MG
0 TABLET,CHEWABLE ORAL
Qty: 0 | Refills: 0 | COMMUNITY

## 2018-01-01 RX ORDER — SODIUM CHLORIDE 0.65 %
1 AEROSOL, SPRAY (ML) NASAL
Qty: 0 | Refills: 0 | Status: DISCONTINUED | OUTPATIENT
Start: 2018-01-01 | End: 2018-01-01

## 2018-01-01 RX ORDER — ALBUTEROL 90 UG/1
2.5 AEROSOL, METERED ORAL EVERY 6 HOURS
Qty: 0 | Refills: 0 | Status: DISCONTINUED | OUTPATIENT
Start: 2018-01-01 | End: 2018-01-01

## 2018-01-01 RX ORDER — METRONIDAZOLE 500 MG
500 TABLET ORAL ONCE
Qty: 0 | Refills: 0 | Status: COMPLETED | OUTPATIENT
Start: 2018-01-01 | End: 2018-01-01

## 2018-01-01 RX ORDER — MINERAL OIL
133 OIL (ML) MISCELLANEOUS DAILY
Qty: 0 | Refills: 0 | Status: DISCONTINUED | OUTPATIENT
Start: 2018-01-01 | End: 2018-01-01

## 2018-01-01 RX ORDER — PANTOPRAZOLE SODIUM 20 MG/1
40 TABLET, DELAYED RELEASE ORAL DAILY
Qty: 0 | Refills: 0 | Status: DISCONTINUED | OUTPATIENT
Start: 2018-01-01 | End: 2018-01-01

## 2018-01-01 RX ORDER — ATROPINE SULFATE 1 %
1 DROPS OPHTHALMIC (EYE)
Qty: 0 | Refills: 0 | COMMUNITY
Start: 2018-01-01

## 2018-01-01 RX ORDER — HYDRALAZINE HCL 50 MG
10 TABLET ORAL EVERY 8 HOURS
Qty: 0 | Refills: 0 | Status: DISCONTINUED | OUTPATIENT
Start: 2018-01-01 | End: 2018-01-01

## 2018-01-01 RX ORDER — NYSTATIN CREAM 100000 [USP'U]/G
1 CREAM TOPICAL
Qty: 0 | Refills: 0 | COMMUNITY
Start: 2018-01-01

## 2018-01-01 RX ORDER — VENLAFAXINE HCL 75 MG
150 CAPSULE, EXT RELEASE 24 HR ORAL DAILY
Qty: 0 | Refills: 0 | Status: DISCONTINUED | OUTPATIENT
Start: 2018-01-01 | End: 2018-01-01

## 2018-01-01 RX ORDER — WARFARIN SODIUM 2.5 MG/1
1.5 TABLET ORAL
Qty: 0 | Refills: 0 | COMMUNITY

## 2018-01-01 RX ORDER — VANCOMYCIN HCL 1 G
1000 VIAL (EA) INTRAVENOUS ONCE
Qty: 0 | Refills: 0 | Status: COMPLETED | OUTPATIENT
Start: 2018-01-01 | End: 2018-01-01

## 2018-01-01 RX ORDER — LACTULOSE 10 G/15ML
10 SOLUTION ORAL
Qty: 0 | Refills: 0 | Status: DISCONTINUED | OUTPATIENT
Start: 2018-01-01 | End: 2018-01-01

## 2018-01-01 RX ORDER — BUDESONIDE, MICRONIZED 100 %
0.5 POWDER (GRAM) MISCELLANEOUS EVERY 12 HOURS
Qty: 0 | Refills: 0 | Status: DISCONTINUED | OUTPATIENT
Start: 2018-01-01 | End: 2018-01-01

## 2018-01-01 RX ORDER — VANCOMYCIN HCL 1 G
1500 VIAL (EA) INTRAVENOUS EVERY 12 HOURS
Qty: 0 | Refills: 0 | Status: DISCONTINUED | OUTPATIENT
Start: 2018-01-01 | End: 2018-01-01

## 2018-01-01 RX ORDER — AZTREONAM 2 G
2000 VIAL (EA) INJECTION EVERY 8 HOURS
Qty: 0 | Refills: 0 | Status: DISCONTINUED | OUTPATIENT
Start: 2018-01-01 | End: 2018-01-01

## 2018-01-01 RX ORDER — ONDANSETRON 8 MG/1
4 TABLET, FILM COATED ORAL EVERY 4 HOURS
Qty: 0 | Refills: 0 | Status: DISCONTINUED | OUTPATIENT
Start: 2018-01-01 | End: 2018-01-01

## 2018-01-01 RX ORDER — METOPROLOL TARTRATE 50 MG
400 TABLET ORAL
Qty: 0 | Refills: 0 | COMMUNITY

## 2018-01-01 RX ORDER — MORPHINE SULFATE 50 MG/1
2 CAPSULE, EXTENDED RELEASE ORAL EVERY 6 HOURS
Qty: 0 | Refills: 0 | Status: DISCONTINUED | OUTPATIENT
Start: 2018-01-01 | End: 2018-01-01

## 2018-01-01 RX ORDER — VENLAFAXINE HCL 75 MG
1 CAPSULE, EXT RELEASE 24 HR ORAL
Qty: 0 | Refills: 0 | COMMUNITY

## 2018-01-01 RX ORDER — METRONIDAZOLE 500 MG
TABLET ORAL
Qty: 0 | Refills: 0 | Status: DISCONTINUED | OUTPATIENT
Start: 2018-01-01 | End: 2018-01-01

## 2018-01-01 RX ORDER — ALBUTEROL 90 UG/1
2 AEROSOL, METERED ORAL EVERY 6 HOURS
Qty: 0 | Refills: 0 | Status: DISCONTINUED | OUTPATIENT
Start: 2018-01-01 | End: 2018-01-01

## 2018-01-01 RX ORDER — ATROPINE SULFATE 1 %
2 DROPS OPHTHALMIC (EYE) EVERY 12 HOURS
Qty: 0 | Refills: 0 | Status: DISCONTINUED | OUTPATIENT
Start: 2018-01-01 | End: 2018-01-01

## 2018-01-01 RX ORDER — GABAPENTIN 400 MG/1
1 CAPSULE ORAL
Qty: 0 | Refills: 0 | COMMUNITY

## 2018-01-01 RX ORDER — DIPHENHYDRAMINE HCL 50 MG
25 CAPSULE ORAL ONCE
Qty: 0 | Refills: 0 | Status: DISCONTINUED | OUTPATIENT
Start: 2018-01-01 | End: 2018-01-01

## 2018-01-01 RX ORDER — MORPHINE SULFATE 50 MG/1
0 CAPSULE, EXTENDED RELEASE ORAL
Qty: 0 | Refills: 0 | COMMUNITY
Start: 2018-01-01

## 2018-01-01 RX ORDER — HYDROMORPHONE HYDROCHLORIDE 2 MG/ML
0 INJECTION INTRAMUSCULAR; INTRAVENOUS; SUBCUTANEOUS
Qty: 0 | Refills: 0 | COMMUNITY
Start: 2018-01-01

## 2018-01-01 RX ORDER — LOPERAMIDE HCL 2 MG
2 TABLET ORAL DAILY
Qty: 0 | Refills: 0 | Status: DISCONTINUED | OUTPATIENT
Start: 2018-01-01 | End: 2018-01-01

## 2018-01-01 RX ORDER — SODIUM CHLORIDE 0.65 %
2 AEROSOL, SPRAY (ML) NASAL
Qty: 0 | Refills: 0 | COMMUNITY

## 2018-01-01 RX ORDER — HYDROMORPHONE HYDROCHLORIDE 2 MG/ML
1 INJECTION INTRAMUSCULAR; INTRAVENOUS; SUBCUTANEOUS THREE TIMES A DAY
Qty: 0 | Refills: 0 | Status: DISCONTINUED | OUTPATIENT
Start: 2018-01-01 | End: 2018-01-01

## 2018-01-01 RX ORDER — QUETIAPINE FUMARATE 200 MG/1
300 TABLET, FILM COATED ORAL AT BEDTIME
Qty: 0 | Refills: 0 | Status: DISCONTINUED | OUTPATIENT
Start: 2018-01-01 | End: 2018-01-01

## 2018-01-01 RX ORDER — SODIUM CHLORIDE 9 MG/ML
3 INJECTION INTRAMUSCULAR; INTRAVENOUS; SUBCUTANEOUS ONCE
Qty: 0 | Refills: 0 | Status: COMPLETED | OUTPATIENT
Start: 2018-01-01 | End: 2018-01-01

## 2018-01-01 RX ORDER — LACTOBACILLUS ACIDOPHILUS 100MM CELL
1 CAPSULE ORAL
Qty: 0 | Refills: 0 | Status: DISCONTINUED | OUTPATIENT
Start: 2018-01-01 | End: 2018-01-01

## 2018-01-01 RX ORDER — DEXTROSE MONOHYDRATE, SODIUM CHLORIDE, AND POTASSIUM CHLORIDE 50; .745; 4.5 G/1000ML; G/1000ML; G/1000ML
1000 INJECTION, SOLUTION INTRAVENOUS
Qty: 0 | Refills: 0 | Status: DISCONTINUED | OUTPATIENT
Start: 2018-01-01 | End: 2018-01-01

## 2018-01-01 RX ORDER — LIDOCAINE 4 G/100G
1 CREAM TOPICAL DAILY
Qty: 0 | Refills: 0 | Status: DISCONTINUED | OUTPATIENT
Start: 2018-01-01 | End: 2018-01-01

## 2018-01-01 RX ORDER — POTASSIUM CHLORIDE 20 MEQ
4 PACKET (EA) ORAL
Qty: 0 | Refills: 0 | COMMUNITY

## 2018-01-01 RX ORDER — SCOPALAMINE 1 MG/3D
1 PATCH, EXTENDED RELEASE TRANSDERMAL
Qty: 0 | Refills: 0 | Status: DISCONTINUED | OUTPATIENT
Start: 2018-01-01 | End: 2018-01-01

## 2018-01-01 RX ORDER — SODIUM BICARBONATE 1 MEQ/ML
650 SYRINGE (ML) INTRAVENOUS THREE TIMES A DAY
Qty: 0 | Refills: 0 | Status: DISCONTINUED | OUTPATIENT
Start: 2018-01-01 | End: 2018-01-01

## 2018-01-01 RX ORDER — POLYETHYLENE GLYCOL 3350 17 G/17G
17 POWDER, FOR SOLUTION ORAL DAILY
Qty: 0 | Refills: 0 | Status: DISCONTINUED | OUTPATIENT
Start: 2018-01-01 | End: 2018-01-01

## 2018-01-01 RX ORDER — METOPROLOL TARTRATE 50 MG
200 TABLET ORAL DAILY
Qty: 0 | Refills: 0 | Status: DISCONTINUED | OUTPATIENT
Start: 2018-01-01 | End: 2018-01-01

## 2018-01-01 RX ORDER — QUETIAPINE FUMARATE 200 MG/1
100 TABLET, FILM COATED ORAL AT BEDTIME
Qty: 0 | Refills: 0 | Status: DISCONTINUED | OUTPATIENT
Start: 2018-01-01 | End: 2018-01-01

## 2018-01-01 RX ORDER — WARFARIN SODIUM 2.5 MG/1
1.5 TABLET ORAL DAILY
Qty: 0 | Refills: 0 | Status: DISCONTINUED | OUTPATIENT
Start: 2018-01-01 | End: 2018-01-01

## 2018-01-01 RX ORDER — MAGNESIUM HYDROXIDE 400 MG/1
30 TABLET, CHEWABLE ORAL DAILY
Qty: 0 | Refills: 0 | Status: DISCONTINUED | OUTPATIENT
Start: 2018-01-01 | End: 2018-01-01

## 2018-01-01 RX ORDER — METOPROLOL TARTRATE 50 MG
5 TABLET ORAL ONCE
Qty: 0 | Refills: 0 | Status: DISCONTINUED | OUTPATIENT
Start: 2018-01-01 | End: 2018-01-01

## 2018-01-01 RX ORDER — VANCOMYCIN HCL 1 G
1500 VIAL (EA) INTRAVENOUS ONCE
Qty: 0 | Refills: 0 | Status: COMPLETED | OUTPATIENT
Start: 2018-01-01 | End: 2018-01-01

## 2018-01-01 RX ORDER — MORPHINE SULFATE 50 MG/1
2 CAPSULE, EXTENDED RELEASE ORAL
Qty: 0 | Refills: 0 | Status: DISCONTINUED | OUTPATIENT
Start: 2018-01-01 | End: 2018-01-01

## 2018-01-01 RX ORDER — TIOTROPIUM BROMIDE 18 UG/1
1 CAPSULE ORAL; RESPIRATORY (INHALATION) DAILY
Qty: 0 | Refills: 0 | Status: DISCONTINUED | OUTPATIENT
Start: 2018-01-01 | End: 2018-01-01

## 2018-01-01 RX ORDER — IOHEXOL 300 MG/ML
30 INJECTION, SOLUTION INTRAVENOUS ONCE
Qty: 0 | Refills: 0 | Status: COMPLETED | OUTPATIENT
Start: 2018-01-01 | End: 2018-01-01

## 2018-01-01 RX ORDER — GLYCERIN ADULT
1 SUPPOSITORY, RECTAL RECTAL DAILY
Qty: 0 | Refills: 0 | Status: DISCONTINUED | OUTPATIENT
Start: 2018-01-01 | End: 2018-01-01

## 2018-01-01 RX ORDER — IPRATROPIUM/ALBUTEROL SULFATE 18-103MCG
0 AEROSOL WITH ADAPTER (GRAM) INHALATION
Qty: 0 | Refills: 0 | COMMUNITY

## 2018-01-01 RX ORDER — ACETAMINOPHEN 500 MG
650 TABLET ORAL ONCE
Qty: 0 | Refills: 0 | Status: DISCONTINUED | OUTPATIENT
Start: 2018-01-01 | End: 2018-01-01

## 2018-01-01 RX ORDER — METOCLOPRAMIDE HCL 10 MG
5 TABLET ORAL EVERY 8 HOURS
Qty: 0 | Refills: 0 | Status: DISCONTINUED | OUTPATIENT
Start: 2018-01-01 | End: 2018-01-01

## 2018-01-01 RX ADMIN — PREGABALIN 1000 MICROGRAM(S): 225 CAPSULE ORAL at 13:27

## 2018-01-01 RX ADMIN — Medication 100 MILLIGRAM(S): at 09:24

## 2018-01-01 RX ADMIN — Medication 1 TABLET(S): at 09:01

## 2018-01-01 RX ADMIN — NYSTATIN CREAM 1 APPLICATION(S): 100000 CREAM TOPICAL at 05:29

## 2018-01-01 RX ADMIN — LACTULOSE 10 GRAM(S): 10 SOLUTION ORAL at 18:12

## 2018-01-01 RX ADMIN — Medication 0.5 MILLIGRAM(S): at 20:42

## 2018-01-01 RX ADMIN — QUETIAPINE FUMARATE 300 MILLIGRAM(S): 200 TABLET, FILM COATED ORAL at 23:26

## 2018-01-01 RX ADMIN — Medication 1 MILLIGRAM(S): at 13:27

## 2018-01-01 RX ADMIN — ALBUTEROL 2.5 MILLIGRAM(S): 90 AEROSOL, METERED ORAL at 20:41

## 2018-01-01 RX ADMIN — NYSTATIN CREAM 1 APPLICATION(S): 100000 CREAM TOPICAL at 05:02

## 2018-01-01 RX ADMIN — MORPHINE SULFATE 2 MILLIGRAM(S): 50 CAPSULE, EXTENDED RELEASE ORAL at 10:55

## 2018-01-01 RX ADMIN — Medication 50 MILLIGRAM(S): at 05:25

## 2018-01-01 RX ADMIN — ALBUTEROL 2.5 MILLIGRAM(S): 90 AEROSOL, METERED ORAL at 19:40

## 2018-01-01 RX ADMIN — Medication 1 DROP(S): at 14:15

## 2018-01-01 RX ADMIN — Medication 650 MILLIGRAM(S): at 13:54

## 2018-01-01 RX ADMIN — Medication 5 MILLIGRAM(S): at 05:02

## 2018-01-01 RX ADMIN — QUETIAPINE FUMARATE 100 MILLIGRAM(S): 200 TABLET, FILM COATED ORAL at 21:49

## 2018-01-01 RX ADMIN — QUETIAPINE FUMARATE 100 MILLIGRAM(S): 200 TABLET, FILM COATED ORAL at 12:07

## 2018-01-01 RX ADMIN — ALBUTEROL 2.5 MILLIGRAM(S): 90 AEROSOL, METERED ORAL at 01:32

## 2018-01-01 RX ADMIN — MORPHINE SULFATE 2 MILLIGRAM(S): 50 CAPSULE, EXTENDED RELEASE ORAL at 03:45

## 2018-01-01 RX ADMIN — MORPHINE SULFATE 2 MILLIGRAM(S): 50 CAPSULE, EXTENDED RELEASE ORAL at 11:07

## 2018-01-01 RX ADMIN — NYSTATIN CREAM 1 APPLICATION(S): 100000 CREAM TOPICAL at 17:43

## 2018-01-01 RX ADMIN — MORPHINE SULFATE 1 MG/HR: 50 CAPSULE, EXTENDED RELEASE ORAL at 06:49

## 2018-01-01 RX ADMIN — NYSTATIN CREAM 1 APPLICATION(S): 100000 CREAM TOPICAL at 05:07

## 2018-01-01 RX ADMIN — Medication 0.5 MILLIGRAM(S): at 19:50

## 2018-01-01 RX ADMIN — HYDROMORPHONE HYDROCHLORIDE 1 MILLIGRAM(S): 2 INJECTION INTRAMUSCULAR; INTRAVENOUS; SUBCUTANEOUS at 12:53

## 2018-01-01 RX ADMIN — Medication 5 MILLIGRAM(S): at 13:16

## 2018-01-01 RX ADMIN — MORPHINE SULFATE 2 MG/HR: 50 CAPSULE, EXTENDED RELEASE ORAL at 09:45

## 2018-01-01 RX ADMIN — Medication 500 MILLIGRAM(S): at 05:08

## 2018-01-01 RX ADMIN — ALBUTEROL 2.5 MILLIGRAM(S): 90 AEROSOL, METERED ORAL at 07:54

## 2018-01-01 RX ADMIN — OLANZAPINE 10 MILLIGRAM(S): 15 TABLET, FILM COATED ORAL at 12:04

## 2018-01-01 RX ADMIN — QUETIAPINE FUMARATE 100 MILLIGRAM(S): 200 TABLET, FILM COATED ORAL at 21:44

## 2018-01-01 RX ADMIN — Medication 1 MILLIGRAM(S): at 14:50

## 2018-01-01 RX ADMIN — Medication 5 MILLIGRAM(S): at 21:30

## 2018-01-01 RX ADMIN — IOHEXOL 30 MILLILITER(S): 300 INJECTION, SOLUTION INTRAVENOUS at 13:50

## 2018-01-01 RX ADMIN — ALBUTEROL 2.5 MILLIGRAM(S): 90 AEROSOL, METERED ORAL at 14:07

## 2018-01-01 RX ADMIN — FAMOTIDINE 20 MILLIGRAM(S): 10 INJECTION INTRAVENOUS at 05:09

## 2018-01-01 RX ADMIN — Medication 0.5 MILLIGRAM(S): at 07:53

## 2018-01-01 RX ADMIN — Medication 100 MILLIGRAM(S): at 13:26

## 2018-01-01 RX ADMIN — MORPHINE SULFATE 2 MG/HR: 50 CAPSULE, EXTENDED RELEASE ORAL at 18:33

## 2018-01-01 RX ADMIN — Medication 1 TABLET(S): at 08:30

## 2018-01-01 RX ADMIN — ALBUTEROL 2.5 MILLIGRAM(S): 90 AEROSOL, METERED ORAL at 19:46

## 2018-01-01 RX ADMIN — SODIUM CHLORIDE 3000 MILLILITER(S): 9 INJECTION INTRAMUSCULAR; INTRAVENOUS; SUBCUTANEOUS at 04:00

## 2018-01-01 RX ADMIN — Medication 1 TABLET(S): at 12:03

## 2018-01-01 RX ADMIN — NYSTATIN CREAM 1 APPLICATION(S): 100000 CREAM TOPICAL at 17:57

## 2018-01-01 RX ADMIN — NYSTATIN CREAM 1 APPLICATION(S): 100000 CREAM TOPICAL at 06:29

## 2018-01-01 RX ADMIN — Medication 400 MILLIGRAM(S): at 16:32

## 2018-01-01 RX ADMIN — Medication 150 MILLIGRAM(S): at 12:40

## 2018-01-01 RX ADMIN — QUETIAPINE FUMARATE 100 MILLIGRAM(S): 200 TABLET, FILM COATED ORAL at 21:21

## 2018-01-01 RX ADMIN — HYDROMORPHONE HYDROCHLORIDE 1 MILLIGRAM(S): 2 INJECTION INTRAMUSCULAR; INTRAVENOUS; SUBCUTANEOUS at 05:11

## 2018-01-01 RX ADMIN — Medication 2 DROP(S): at 17:58

## 2018-01-01 RX ADMIN — Medication 100 MILLIGRAM(S): at 02:00

## 2018-01-01 RX ADMIN — ALBUTEROL 2.5 MILLIGRAM(S): 90 AEROSOL, METERED ORAL at 00:57

## 2018-01-01 RX ADMIN — Medication 2 DROP(S): at 14:15

## 2018-01-01 RX ADMIN — MORPHINE SULFATE 2 MG/HR: 50 CAPSULE, EXTENDED RELEASE ORAL at 11:27

## 2018-01-01 RX ADMIN — ALBUTEROL 2.5 MILLIGRAM(S): 90 AEROSOL, METERED ORAL at 00:35

## 2018-01-01 RX ADMIN — ALBUTEROL 2.5 MILLIGRAM(S): 90 AEROSOL, METERED ORAL at 07:50

## 2018-01-01 RX ADMIN — Medication 0.5 MILLIGRAM(S): at 20:10

## 2018-01-01 RX ADMIN — QUETIAPINE FUMARATE 100 MILLIGRAM(S): 200 TABLET, FILM COATED ORAL at 23:19

## 2018-01-01 RX ADMIN — PREGABALIN 1000 MICROGRAM(S): 225 CAPSULE ORAL at 12:41

## 2018-01-01 RX ADMIN — LIDOCAINE 1 PATCH: 4 CREAM TOPICAL at 12:55

## 2018-01-01 RX ADMIN — QUETIAPINE FUMARATE 100 MILLIGRAM(S): 200 TABLET, FILM COATED ORAL at 21:40

## 2018-01-01 RX ADMIN — Medication 1 TABLET(S): at 17:25

## 2018-01-01 RX ADMIN — MORPHINE SULFATE 2 MG/HR: 50 CAPSULE, EXTENDED RELEASE ORAL at 14:23

## 2018-01-01 RX ADMIN — NYSTATIN CREAM 1 APPLICATION(S): 100000 CREAM TOPICAL at 17:44

## 2018-01-01 RX ADMIN — Medication 5 MILLIGRAM(S): at 05:12

## 2018-01-01 RX ADMIN — NYSTATIN CREAM 1 APPLICATION(S): 100000 CREAM TOPICAL at 05:16

## 2018-01-01 RX ADMIN — LIDOCAINE 1 PATCH: 4 CREAM TOPICAL at 23:03

## 2018-01-01 RX ADMIN — Medication 0.5 MILLIGRAM(S): at 07:52

## 2018-01-01 RX ADMIN — NYSTATIN CREAM 1 APPLICATION(S): 100000 CREAM TOPICAL at 05:12

## 2018-01-01 RX ADMIN — Medication 5 MILLIGRAM(S): at 14:14

## 2018-01-01 RX ADMIN — CHOLESTYRAMINE 4 GRAM(S): 4 POWDER, FOR SUSPENSION ORAL at 09:01

## 2018-01-01 RX ADMIN — Medication 650 MILLIGRAM(S): at 05:09

## 2018-01-01 RX ADMIN — Medication 2 DROP(S): at 23:00

## 2018-01-01 RX ADMIN — Medication 1000 MILLIGRAM(S): at 17:10

## 2018-01-01 RX ADMIN — ALBUTEROL 2.5 MILLIGRAM(S): 90 AEROSOL, METERED ORAL at 08:01

## 2018-01-01 RX ADMIN — Medication 1 DROP(S): at 17:44

## 2018-01-01 RX ADMIN — ALBUTEROL 2.5 MILLIGRAM(S): 90 AEROSOL, METERED ORAL at 20:28

## 2018-01-01 RX ADMIN — Medication 1 DROP(S): at 05:12

## 2018-01-01 RX ADMIN — ALBUTEROL 2.5 MILLIGRAM(S): 90 AEROSOL, METERED ORAL at 19:55

## 2018-01-01 RX ADMIN — MORPHINE SULFATE 2 MILLIGRAM(S): 50 CAPSULE, EXTENDED RELEASE ORAL at 18:30

## 2018-01-01 RX ADMIN — WARFARIN SODIUM 1.5 MILLIGRAM(S): 2.5 TABLET ORAL at 23:27

## 2018-01-01 RX ADMIN — Medication 100 MILLIGRAM(S): at 07:00

## 2018-01-01 RX ADMIN — NYSTATIN CREAM 1 APPLICATION(S): 100000 CREAM TOPICAL at 19:49

## 2018-01-01 RX ADMIN — MORPHINE SULFATE 2 MILLIGRAM(S): 50 CAPSULE, EXTENDED RELEASE ORAL at 12:27

## 2018-01-01 RX ADMIN — ALBUTEROL 2.5 MILLIGRAM(S): 90 AEROSOL, METERED ORAL at 14:51

## 2018-01-01 RX ADMIN — HYDROMORPHONE HYDROCHLORIDE 1 MILLIGRAM(S): 2 INJECTION INTRAMUSCULAR; INTRAVENOUS; SUBCUTANEOUS at 12:30

## 2018-01-01 RX ADMIN — MORPHINE SULFATE 2 MILLIGRAM(S): 50 CAPSULE, EXTENDED RELEASE ORAL at 19:24

## 2018-01-01 RX ADMIN — HYDROMORPHONE HYDROCHLORIDE 1 MILLIGRAM(S): 2 INJECTION INTRAMUSCULAR; INTRAVENOUS; SUBCUTANEOUS at 07:35

## 2018-01-01 RX ADMIN — Medication 500 MILLIGRAM(S): at 13:27

## 2018-01-01 RX ADMIN — Medication 150 MILLIGRAM(S): at 12:54

## 2018-01-01 RX ADMIN — MORPHINE SULFATE 2 MILLIGRAM(S): 50 CAPSULE, EXTENDED RELEASE ORAL at 10:21

## 2018-01-01 RX ADMIN — Medication 150 MILLIGRAM(S): at 13:27

## 2018-01-01 RX ADMIN — Medication 650 MILLIGRAM(S): at 07:00

## 2018-01-01 RX ADMIN — Medication 1 MILLIGRAM(S): at 03:21

## 2018-01-01 RX ADMIN — Medication 1 TABLET(S): at 12:55

## 2018-01-01 RX ADMIN — MORPHINE SULFATE 0.5 MG/HR: 50 CAPSULE, EXTENDED RELEASE ORAL at 16:56

## 2018-01-01 RX ADMIN — SODIUM CHLORIDE 1000 MILLILITER(S): 9 INJECTION INTRAMUSCULAR; INTRAVENOUS; SUBCUTANEOUS at 13:30

## 2018-01-01 RX ADMIN — Medication 50 MILLIGRAM(S): at 23:26

## 2018-01-01 RX ADMIN — Medication 500 MILLIGRAM(S): at 00:34

## 2018-01-01 RX ADMIN — MORPHINE SULFATE 2 MILLIGRAM(S): 50 CAPSULE, EXTENDED RELEASE ORAL at 18:53

## 2018-01-01 RX ADMIN — Medication 650 MILLIGRAM(S): at 05:59

## 2018-01-01 RX ADMIN — Medication 150 MILLIGRAM(S): at 12:34

## 2018-01-01 RX ADMIN — ALBUTEROL 2.5 MILLIGRAM(S): 90 AEROSOL, METERED ORAL at 08:10

## 2018-01-01 RX ADMIN — Medication 500 MILLIGRAM(S): at 07:00

## 2018-01-01 RX ADMIN — Medication 5 MILLIGRAM(S): at 21:58

## 2018-01-01 RX ADMIN — ALBUTEROL 2.5 MILLIGRAM(S): 90 AEROSOL, METERED ORAL at 13:13

## 2018-01-01 RX ADMIN — NYSTATIN CREAM 1 APPLICATION(S): 100000 CREAM TOPICAL at 17:25

## 2018-01-01 RX ADMIN — ALBUTEROL 2.5 MILLIGRAM(S): 90 AEROSOL, METERED ORAL at 19:41

## 2018-01-01 RX ADMIN — MORPHINE SULFATE 2 MILLIGRAM(S): 50 CAPSULE, EXTENDED RELEASE ORAL at 17:49

## 2018-01-01 RX ADMIN — Medication 1 TABLET(S): at 18:12

## 2018-01-01 RX ADMIN — ALBUTEROL 2.5 MILLIGRAM(S): 90 AEROSOL, METERED ORAL at 07:53

## 2018-01-01 RX ADMIN — NYSTATIN CREAM 1 APPLICATION(S): 100000 CREAM TOPICAL at 05:09

## 2018-01-01 RX ADMIN — Medication 1 DROP(S): at 17:57

## 2018-01-01 RX ADMIN — ALBUTEROL 2.5 MILLIGRAM(S): 90 AEROSOL, METERED ORAL at 01:55

## 2018-01-01 RX ADMIN — Medication 100 MILLIGRAM(S): at 05:08

## 2018-01-01 RX ADMIN — Medication 100 MILLIGRAM(S): at 19:43

## 2018-01-01 RX ADMIN — FAMOTIDINE 20 MILLIGRAM(S): 10 INJECTION INTRAVENOUS at 19:37

## 2018-01-01 RX ADMIN — QUETIAPINE FUMARATE 100 MILLIGRAM(S): 200 TABLET, FILM COATED ORAL at 00:35

## 2018-01-01 RX ADMIN — ALBUTEROL 2.5 MILLIGRAM(S): 90 AEROSOL, METERED ORAL at 19:45

## 2018-01-01 RX ADMIN — Medication 2 DROP(S): at 05:29

## 2018-01-01 RX ADMIN — QUETIAPINE FUMARATE 100 MILLIGRAM(S): 200 TABLET, FILM COATED ORAL at 12:55

## 2018-01-01 RX ADMIN — MORPHINE SULFATE 2 MILLIGRAM(S): 50 CAPSULE, EXTENDED RELEASE ORAL at 05:15

## 2018-01-01 RX ADMIN — QUETIAPINE FUMARATE 100 MILLIGRAM(S): 200 TABLET, FILM COATED ORAL at 21:58

## 2018-01-01 RX ADMIN — SODIUM CHLORIDE 1000 MILLILITER(S): 9 INJECTION INTRAMUSCULAR; INTRAVENOUS; SUBCUTANEOUS at 12:30

## 2018-01-01 RX ADMIN — FENTANYL CITRATE 1 PATCH: 50 INJECTION INTRAVENOUS at 16:20

## 2018-01-01 RX ADMIN — FAMOTIDINE 20 MILLIGRAM(S): 10 INJECTION INTRAVENOUS at 07:00

## 2018-01-01 RX ADMIN — Medication 1 DROP(S): at 23:02

## 2018-01-01 RX ADMIN — NYSTATIN CREAM 1 APPLICATION(S): 100000 CREAM TOPICAL at 17:21

## 2018-01-01 RX ADMIN — Medication 1 TABLET(S): at 12:39

## 2018-01-01 RX ADMIN — Medication 650 MILLIGRAM(S): at 22:34

## 2018-01-01 RX ADMIN — Medication 5 MILLIGRAM(S): at 05:28

## 2018-01-01 RX ADMIN — LIDOCAINE 1 PATCH: 4 CREAM TOPICAL at 03:27

## 2018-01-01 RX ADMIN — PREGABALIN 1000 MICROGRAM(S): 225 CAPSULE ORAL at 12:35

## 2018-01-01 RX ADMIN — Medication 150 MILLIGRAM(S): at 12:07

## 2018-01-01 RX ADMIN — Medication 50 MILLIGRAM(S): at 18:00

## 2018-01-01 RX ADMIN — Medication 2 DROP(S): at 11:05

## 2018-01-01 RX ADMIN — QUETIAPINE FUMARATE 100 MILLIGRAM(S): 200 TABLET, FILM COATED ORAL at 11:27

## 2018-01-01 RX ADMIN — SODIUM CHLORIDE 1000 MILLILITER(S): 9 INJECTION INTRAMUSCULAR; INTRAVENOUS; SUBCUTANEOUS at 00:19

## 2018-01-01 RX ADMIN — QUETIAPINE FUMARATE 100 MILLIGRAM(S): 200 TABLET, FILM COATED ORAL at 12:34

## 2018-01-01 RX ADMIN — NYSTATIN CREAM 1 APPLICATION(S): 100000 CREAM TOPICAL at 05:34

## 2018-01-01 RX ADMIN — Medication 100 MILLIGRAM(S): at 17:37

## 2018-01-01 RX ADMIN — Medication 1 TABLET(S): at 16:33

## 2018-01-01 RX ADMIN — Medication 300 MILLIGRAM(S): at 14:55

## 2018-01-01 RX ADMIN — Medication 100 MILLIGRAM(S): at 09:46

## 2018-01-01 RX ADMIN — Medication 150 MILLIGRAM(S): at 13:54

## 2018-01-01 RX ADMIN — Medication 650 MILLIGRAM(S): at 00:34

## 2018-01-01 RX ADMIN — Medication 500 MILLIGRAM(S): at 21:40

## 2018-01-01 RX ADMIN — LIDOCAINE 1 PATCH: 4 CREAM TOPICAL at 11:05

## 2018-01-01 RX ADMIN — HYDROMORPHONE HYDROCHLORIDE 1 MILLIGRAM(S): 2 INJECTION INTRAMUSCULAR; INTRAVENOUS; SUBCUTANEOUS at 11:37

## 2018-01-01 RX ADMIN — ALBUTEROL 2.5 MILLIGRAM(S): 90 AEROSOL, METERED ORAL at 07:14

## 2018-01-01 RX ADMIN — TIOTROPIUM BROMIDE 1 CAPSULE(S): 18 CAPSULE ORAL; RESPIRATORY (INHALATION) at 09:51

## 2018-01-01 RX ADMIN — NYSTATIN CREAM 1 APPLICATION(S): 100000 CREAM TOPICAL at 07:00

## 2018-01-01 RX ADMIN — Medication 5 MILLIGRAM(S): at 21:43

## 2018-01-01 RX ADMIN — TIOTROPIUM BROMIDE 1 CAPSULE(S): 18 CAPSULE ORAL; RESPIRATORY (INHALATION) at 09:06

## 2018-01-01 RX ADMIN — NYSTATIN CREAM 1 APPLICATION(S): 100000 CREAM TOPICAL at 17:19

## 2018-01-01 RX ADMIN — Medication 650 MILLIGRAM(S): at 13:31

## 2018-01-01 RX ADMIN — Medication 300 MILLIGRAM(S): at 05:31

## 2018-01-01 RX ADMIN — Medication 650 MILLIGRAM(S): at 15:25

## 2018-01-01 RX ADMIN — Medication 1 DROP(S): at 05:33

## 2018-01-01 RX ADMIN — ALBUTEROL 2.5 MILLIGRAM(S): 90 AEROSOL, METERED ORAL at 02:16

## 2018-01-01 RX ADMIN — ALBUTEROL 2.5 MILLIGRAM(S): 90 AEROSOL, METERED ORAL at 20:06

## 2018-01-01 RX ADMIN — Medication 650 MILLIGRAM(S): at 21:25

## 2018-01-01 RX ADMIN — Medication 2 DROP(S): at 17:44

## 2018-01-01 RX ADMIN — Medication 1 MILLIGRAM(S): at 15:04

## 2018-01-01 RX ADMIN — MORPHINE SULFATE 2 MG/HR: 50 CAPSULE, EXTENDED RELEASE ORAL at 13:30

## 2018-01-01 RX ADMIN — GABAPENTIN 600 MILLIGRAM(S): 400 CAPSULE ORAL at 23:26

## 2018-01-01 RX ADMIN — Medication 2 DROP(S): at 05:12

## 2018-01-01 RX ADMIN — NYSTATIN CREAM 1 APPLICATION(S): 100000 CREAM TOPICAL at 18:34

## 2018-01-01 RX ADMIN — MORPHINE SULFATE 2 MILLIGRAM(S): 50 CAPSULE, EXTENDED RELEASE ORAL at 10:52

## 2018-01-01 RX ADMIN — Medication 100 MILLIGRAM(S): at 21:40

## 2018-01-01 RX ADMIN — HYDROMORPHONE HYDROCHLORIDE 1 MILLIGRAM(S): 2 INJECTION INTRAMUSCULAR; INTRAVENOUS; SUBCUTANEOUS at 11:50

## 2018-01-01 RX ADMIN — ALBUTEROL 2.5 MILLIGRAM(S): 90 AEROSOL, METERED ORAL at 02:19

## 2018-01-01 RX ADMIN — FENTANYL CITRATE 1 PATCH: 50 INJECTION INTRAVENOUS at 07:29

## 2018-01-01 RX ADMIN — Medication 0.5 MILLIGRAM(S): at 07:57

## 2018-01-01 RX ADMIN — ALBUTEROL 2.5 MILLIGRAM(S): 90 AEROSOL, METERED ORAL at 14:01

## 2018-01-01 RX ADMIN — SODIUM CHLORIDE 1000 MILLILITER(S): 9 INJECTION INTRAMUSCULAR; INTRAVENOUS; SUBCUTANEOUS at 23:19

## 2018-01-01 RX ADMIN — Medication 1 MILLIGRAM(S): at 10:56

## 2018-01-01 RX ADMIN — Medication 1 MILLIGRAM(S): at 12:03

## 2018-01-01 RX ADMIN — Medication 5 MILLIGRAM(S): at 01:56

## 2018-01-01 RX ADMIN — FAMOTIDINE 20 MILLIGRAM(S): 10 INJECTION INTRAVENOUS at 05:15

## 2018-01-01 RX ADMIN — ALBUTEROL 2.5 MILLIGRAM(S): 90 AEROSOL, METERED ORAL at 08:03

## 2018-01-01 RX ADMIN — Medication 650 MILLIGRAM(S): at 05:30

## 2018-01-01 RX ADMIN — Medication 100 MILLIGRAM(S): at 22:34

## 2018-01-01 RX ADMIN — Medication 1 MILLIGRAM(S): at 05:28

## 2018-01-01 RX ADMIN — Medication 650 MILLIGRAM(S): at 23:19

## 2018-01-01 RX ADMIN — ALBUTEROL 2.5 MILLIGRAM(S): 90 AEROSOL, METERED ORAL at 13:55

## 2018-01-01 RX ADMIN — SODIUM CHLORIDE 100 MILLILITER(S): 9 INJECTION, SOLUTION INTRAVENOUS at 13:31

## 2018-01-01 RX ADMIN — MORPHINE SULFATE 2 MILLIGRAM(S): 50 CAPSULE, EXTENDED RELEASE ORAL at 12:40

## 2018-01-01 RX ADMIN — ALBUTEROL 2.5 MILLIGRAM(S): 90 AEROSOL, METERED ORAL at 07:35

## 2018-01-01 RX ADMIN — FENTANYL CITRATE 1 PATCH: 50 INJECTION INTRAVENOUS at 17:56

## 2018-01-01 RX ADMIN — Medication 1 TABLET(S): at 09:06

## 2018-01-01 RX ADMIN — FAMOTIDINE 20 MILLIGRAM(S): 10 INJECTION INTRAVENOUS at 18:12

## 2018-01-01 RX ADMIN — QUETIAPINE FUMARATE 100 MILLIGRAM(S): 200 TABLET, FILM COATED ORAL at 21:25

## 2018-01-01 RX ADMIN — SODIUM CHLORIDE 3 MILLILITER(S): 9 INJECTION INTRAMUSCULAR; INTRAVENOUS; SUBCUTANEOUS at 12:30

## 2018-01-01 RX ADMIN — CHOLESTYRAMINE 4 GRAM(S): 4 POWDER, FOR SUSPENSION ORAL at 09:07

## 2018-01-01 RX ADMIN — ALBUTEROL 2.5 MILLIGRAM(S): 90 AEROSOL, METERED ORAL at 19:39

## 2018-01-01 RX ADMIN — Medication 1 MILLIGRAM(S): at 17:41

## 2018-01-01 RX ADMIN — Medication 650 MILLIGRAM(S): at 13:26

## 2018-01-01 RX ADMIN — SODIUM CHLORIDE 250 MILLILITER(S): 9 INJECTION INTRAMUSCULAR; INTRAVENOUS; SUBCUTANEOUS at 08:59

## 2018-01-01 RX ADMIN — Medication 1 DROP(S): at 11:04

## 2018-01-01 RX ADMIN — Medication 250 MILLIGRAM(S): at 17:05

## 2018-01-01 RX ADMIN — PANTOPRAZOLE SODIUM 40 MILLIGRAM(S): 20 TABLET, DELAYED RELEASE ORAL at 23:19

## 2018-01-01 RX ADMIN — NYSTATIN CREAM 1 APPLICATION(S): 100000 CREAM TOPICAL at 17:38

## 2018-01-01 RX ADMIN — Medication 0.5 MILLIGRAM(S): at 19:45

## 2018-01-01 RX ADMIN — TIOTROPIUM BROMIDE 1 CAPSULE(S): 18 CAPSULE ORAL; RESPIRATORY (INHALATION) at 08:30

## 2018-01-01 RX ADMIN — Medication 100 MILLIGRAM(S): at 13:55

## 2018-01-01 RX ADMIN — ALBUTEROL 2.5 MILLIGRAM(S): 90 AEROSOL, METERED ORAL at 14:35

## 2018-01-01 RX ADMIN — NYSTATIN CREAM 1 APPLICATION(S): 100000 CREAM TOPICAL at 17:51

## 2018-01-01 RX ADMIN — ALBUTEROL 2.5 MILLIGRAM(S): 90 AEROSOL, METERED ORAL at 20:39

## 2018-01-01 RX ADMIN — Medication 1 TABLET(S): at 12:41

## 2018-01-01 RX ADMIN — ALBUTEROL 2.5 MILLIGRAM(S): 90 AEROSOL, METERED ORAL at 00:52

## 2018-01-01 RX ADMIN — ALBUTEROL 2.5 MILLIGRAM(S): 90 AEROSOL, METERED ORAL at 07:52

## 2018-01-01 RX ADMIN — Medication 0.5 MILLIGRAM(S): at 07:58

## 2018-01-01 RX ADMIN — ALBUTEROL 2.5 MILLIGRAM(S): 90 AEROSOL, METERED ORAL at 19:50

## 2018-01-01 RX ADMIN — QUETIAPINE FUMARATE 100 MILLIGRAM(S): 200 TABLET, FILM COATED ORAL at 12:03

## 2018-01-01 RX ADMIN — Medication 650 MILLIGRAM(S): at 05:14

## 2018-01-01 RX ADMIN — ALBUTEROL 2.5 MILLIGRAM(S): 90 AEROSOL, METERED ORAL at 01:40

## 2018-01-01 RX ADMIN — Medication 1 TABLET(S): at 12:40

## 2018-01-01 RX ADMIN — ALBUTEROL 2.5 MILLIGRAM(S): 90 AEROSOL, METERED ORAL at 07:58

## 2018-01-01 RX ADMIN — MORPHINE SULFATE 2 MILLIGRAM(S): 50 CAPSULE, EXTENDED RELEASE ORAL at 10:45

## 2018-01-01 RX ADMIN — Medication 1 TABLET(S): at 13:26

## 2018-01-01 RX ADMIN — LIDOCAINE 1 PATCH: 4 CREAM TOPICAL at 12:34

## 2018-01-01 RX ADMIN — PREGABALIN 1000 MICROGRAM(S): 225 CAPSULE ORAL at 17:37

## 2018-01-01 RX ADMIN — ALBUTEROL 2.5 MILLIGRAM(S): 90 AEROSOL, METERED ORAL at 14:16

## 2018-01-01 RX ADMIN — PREGABALIN 1000 MICROGRAM(S): 225 CAPSULE ORAL at 12:54

## 2018-01-01 RX ADMIN — ALBUTEROL 2.5 MILLIGRAM(S): 90 AEROSOL, METERED ORAL at 13:40

## 2018-01-01 RX ADMIN — ALBUTEROL 2.5 MILLIGRAM(S): 90 AEROSOL, METERED ORAL at 14:20

## 2018-01-01 RX ADMIN — PREGABALIN 1000 MICROGRAM(S): 225 CAPSULE ORAL at 12:03

## 2018-01-01 RX ADMIN — Medication 5 MILLIGRAM(S): at 06:31

## 2018-01-01 RX ADMIN — HYDROMORPHONE HYDROCHLORIDE 1 MILLIGRAM(S): 2 INJECTION INTRAMUSCULAR; INTRAVENOUS; SUBCUTANEOUS at 08:20

## 2018-01-01 RX ADMIN — Medication 100 MILLIGRAM(S): at 05:31

## 2018-01-01 RX ADMIN — MORPHINE SULFATE 2 MILLIGRAM(S): 50 CAPSULE, EXTENDED RELEASE ORAL at 05:43

## 2018-01-01 RX ADMIN — Medication 1 MILLIGRAM(S): at 12:34

## 2018-01-01 RX ADMIN — Medication 650 MILLIGRAM(S): at 21:40

## 2018-01-01 RX ADMIN — ALBUTEROL 2.5 MILLIGRAM(S): 90 AEROSOL, METERED ORAL at 07:56

## 2018-01-01 RX ADMIN — HYDROMORPHONE HYDROCHLORIDE 1 MILLIGRAM(S): 2 INJECTION INTRAMUSCULAR; INTRAVENOUS; SUBCUTANEOUS at 18:36

## 2018-01-01 RX ADMIN — LIDOCAINE 1 PATCH: 4 CREAM TOPICAL at 01:34

## 2018-01-01 RX ADMIN — HYDROMORPHONE HYDROCHLORIDE 1 MILLIGRAM(S): 2 INJECTION INTRAMUSCULAR; INTRAVENOUS; SUBCUTANEOUS at 17:41

## 2018-01-01 RX ADMIN — NYSTATIN CREAM 1 APPLICATION(S): 100000 CREAM TOPICAL at 19:13

## 2018-01-01 RX ADMIN — Medication 1 TABLET(S): at 13:27

## 2018-01-01 RX ADMIN — Medication 1 TABLET(S): at 13:54

## 2018-01-01 RX ADMIN — Medication 100 MILLIGRAM(S): at 00:39

## 2018-01-01 RX ADMIN — NYSTATIN CREAM 1 APPLICATION(S): 100000 CREAM TOPICAL at 05:30

## 2018-01-01 RX ADMIN — NYSTATIN CREAM 1 APPLICATION(S): 100000 CREAM TOPICAL at 18:25

## 2018-01-01 RX ADMIN — FENTANYL CITRATE 1 PATCH: 50 INJECTION INTRAVENOUS at 18:19

## 2018-01-01 RX ADMIN — SODIUM CHLORIDE 1000 MILLILITER(S): 9 INJECTION INTRAMUSCULAR; INTRAVENOUS; SUBCUTANEOUS at 03:00

## 2018-01-01 RX ADMIN — ALBUTEROL 2.5 MILLIGRAM(S): 90 AEROSOL, METERED ORAL at 20:22

## 2018-01-01 RX ADMIN — Medication 150 MILLIGRAM(S): at 12:03

## 2018-01-01 RX ADMIN — Medication 5 MILLIGRAM(S): at 15:13

## 2018-01-01 RX ADMIN — Medication 1 DROP(S): at 00:33

## 2018-01-01 RX ADMIN — Medication 5 MILLIGRAM(S): at 21:24

## 2018-01-01 RX ADMIN — NYSTATIN CREAM 1 APPLICATION(S): 100000 CREAM TOPICAL at 06:00

## 2018-01-01 RX ADMIN — QUETIAPINE FUMARATE 100 MILLIGRAM(S): 200 TABLET, FILM COATED ORAL at 12:41

## 2018-01-01 RX ADMIN — MORPHINE SULFATE 2 MILLIGRAM(S): 50 CAPSULE, EXTENDED RELEASE ORAL at 11:10

## 2018-01-01 RX ADMIN — Medication 150 MILLIGRAM(S): at 11:28

## 2018-09-29 NOTE — ED PROVIDER NOTE - NS_ ATTENDINGSCRIBEDETAILS _ED_A_ED_FT
Deshawn Flannery MD - The scribe's documentation has been prepared under my direction and personally reviewed by me in its entirety. I confirm that the note above accurately reflects all work, treatment, procedures, and medical decision making performed by me.

## 2018-09-29 NOTE — ED PROVIDER NOTE - SKIN, MLM
Skin normal color for race, warm, dry. Unstageable pressure ulcer/hemorrhagic bulla on the right buttock area and smaller hemorrhagic bulla on the left buttocks

## 2018-09-29 NOTE — ED PROVIDER NOTE - OBJECTIVE STATEMENT
72 y/o M pt with hx cholecystectomy BIBA presents to the ED c/o coughing and vomiting blood today and SOB for 1.5 weeks. Blood was pink in color and he states that he brought up enough blood to soak 3 towels. Pt states that he stopped eating a couple of days ago in order to lose weight. After which when he started eating again it became difficult to swallow food. He states that when he eats food he starts to choke. Pt states that he has had diarrhea for 5 weeks. He states that he vomited twice today, which was blood tinged. Associated with chest pain, rated as 5/10 in severity. He has received breathing treatments PTA. He is on Coumadin. Denies fever, chills, cold like sx, pain in legs, headache, or rash. No other complaints at this time.

## 2018-09-29 NOTE — ED ADULT NURSE NOTE - NSIMPLEMENTINTERV_GEN_ALL_ED
Implemented All Fall with Harm Risk Interventions:  [place] to call system. Call bell, personal items and telephone within reach. Instruct patient to call for assistance. Room bathroom lighting operational. Non-slip footwear when patient is off stretcher. Physically safe environment: no spills, clutter or unnecessary equipment. Stretcher in lowest position, wheels locked, appropriate side rails in place. Provide visual cue, wrist band, yellow gown, etc. Monitor gait and stability. Monitor for mental status changes and reorient to person, place, and time. Review medications for side effects contributing to fall risk. Reinforce activity limits and safety measures with patient and family. Provide visual clues: red socks.

## 2018-09-29 NOTE — ED ADULT NURSE NOTE - PMH
Angina at rest    COPD mixed type    Essential hypertension    Morbid obesity Acute congestive heart failure, unspecified heart failure type    Acute congestive heart failure, unspecified heart failure type    Angina at rest    Bipolar 1 disorder    Candida infection    Cellulitis, unspecified cellulitis site  abdomen buttocks  Chronic atrial fibrillation    COPD mixed type    Essential hypertension    Gastroesophageal reflux disease, esophagitis presence not specified    Morbid obesity    Myocardial infarct, old    Other pulmonary embolism with acute cor pulmonale, unspecified chronicity    Primary osteoarthritis, unspecified site    Rheumatoid arthritis with positive rheumatoid factor, involving unspecified site

## 2018-10-05 PROBLEM — I25.2 OLD MYOCARDIAL INFARCTION: Chronic | Status: ACTIVE | Noted: 2018-01-01

## 2018-10-05 PROBLEM — I48.2 CHRONIC ATRIAL FIBRILLATION: Chronic | Status: ACTIVE | Noted: 2018-01-01

## 2018-10-05 PROBLEM — F31.9 BIPOLAR DISORDER, UNSPECIFIED: Chronic | Status: ACTIVE | Noted: 2018-01-01

## 2018-10-05 PROBLEM — M19.91 PRIMARY OSTEOARTHRITIS, UNSPECIFIED SITE: Chronic | Status: ACTIVE | Noted: 2018-01-01

## 2018-10-05 PROBLEM — L03.90 CELLULITIS, UNSPECIFIED: Chronic | Status: ACTIVE | Noted: 2018-01-01

## 2018-10-05 PROBLEM — J44.9 CHRONIC OBSTRUCTIVE PULMONARY DISEASE, UNSPECIFIED: Chronic | Status: ACTIVE | Noted: 2018-01-01

## 2018-10-05 PROBLEM — I26.09 OTHER PULMONARY EMBOLISM WITH ACUTE COR PULMONALE: Chronic | Status: ACTIVE | Noted: 2018-01-01

## 2018-10-05 PROBLEM — E66.01 MORBID (SEVERE) OBESITY DUE TO EXCESS CALORIES: Chronic | Status: ACTIVE | Noted: 2018-01-01

## 2018-10-05 PROBLEM — I50.9 HEART FAILURE, UNSPECIFIED: Chronic | Status: ACTIVE | Noted: 2018-01-01

## 2018-10-05 PROBLEM — M05.9 RHEUMATOID ARTHRITIS WITH RHEUMATOID FACTOR, UNSPECIFIED: Chronic | Status: ACTIVE | Noted: 2018-01-01

## 2018-10-05 PROBLEM — B37.9 CANDIDIASIS, UNSPECIFIED: Chronic | Status: ACTIVE | Noted: 2018-01-01

## 2018-10-05 PROBLEM — I10 ESSENTIAL (PRIMARY) HYPERTENSION: Chronic | Status: ACTIVE | Noted: 2018-01-01

## 2018-10-05 PROBLEM — K21.9 GASTRO-ESOPHAGEAL REFLUX DISEASE WITHOUT ESOPHAGITIS: Chronic | Status: ACTIVE | Noted: 2018-01-01

## 2018-10-05 PROBLEM — I20.8 OTHER FORMS OF ANGINA PECTORIS: Chronic | Status: ACTIVE | Noted: 2018-01-01

## 2018-10-05 NOTE — ED ADULT NURSE NOTE - OBJECTIVE STATEMENT
Received the patient in the Er. Patient is alert and oriented. Skin warm and dry. C/o Diarrhea for 2 weeks. patient is obese. Stomach is overlapping the genital area. Patient is actively having diarrhea. Green color liquid diarrhea noticed. Specimen sent to the Lab for occult blood and c diff. Multiple pressure ulcer noticed around the sacrum, genital area bilateral thighs. in various stages of healing. Unable to visualize all the wound secondary to enlarged body mass. Patient is mouth and toung are very dry.

## 2018-10-05 NOTE — CONSULT NOTE ADULT - SUBJECTIVE AND OBJECTIVE BOX
Date/Time Patient Seen:  		  Referring MD:   Data Reviewed	       Patient is a 73y old  Male who presents with a chief complaint of not feeling well (05 Oct 2018 18:07)      Subjective/HPI    in bed  seen and examined  vs and meds reviewed  H and P reviewed  medical records reviewed  nursing home records reviewed  imaging and labs reviewed    alert  oriented  awake  verbal  resides in [facility]    ED Provider Note [Charted Location: [facility] ED] [Authored: 05-Oct-2018 12:47]- for Visit: [id number], Complete, Revised, Signed in Full, General    HISTORY OF PRESENT ILLNESS:    High Risk Travel:  International Travel? No(1)    · Chief Complaint: The patient is a 73y Male complaining of abdominal pain.  · HPI Objective Statement: pt bib ems from snf for abd pain diarrhea. pt denies pain, only c/o sob. pt poor historian, unable to provide reliable hx.  pmd - dani  · Presenting Symptoms: DIARRHEA, PAIN, sob  · Negative Findings: no vomiting  · Location: abdomen  · Timing: unknown  · Severity: MILD  · Context: unknown  · Aggravated Factors: none  · Relieving Factors: none       PAST MEDICAL & SURGICAL HISTORY:  Myocardial infarct, old  Gastroesophageal reflux disease, esophagitis presence not specified  Other pulmonary embolism with acute cor pulmonale, unspecified chronicity  Acute congestive heart failure, unspecified heart failure type  Cellulitis, unspecified cellulitis site: abdomen buttocks  Candida infection  Acute congestive heart failure, unspecified heart failure type  Rheumatoid arthritis with positive rheumatoid factor, involving unspecified site  Primary osteoarthritis, unspecified site  Chronic atrial fibrillation  Bipolar 1 disorder  Angina at rest  COPD mixed type  Essential hypertension  Morbid obesity        Medication list         MEDICATIONS  (STANDING):  ALBUTerol    0.083% 2.5 milliGRAM(s) Nebulizer every 6 hours  aztreonam  IVPB 1000 milliGRAM(s) IV Intermittent every 6 hours  gabapentin 600 milliGRAM(s) Oral three times a day  guaiFENesin  milliGRAM(s) Oral every 12 hours  hydrALAZINE 10 milliGRAM(s) Oral every 8 hours  lactobacillus acidophilus 1 Tablet(s) Oral three times a day with meals  loperamide 2 milliGRAM(s) Oral daily  methocarbamol 500 milliGRAM(s) Oral two times a day  metoprolol succinate  milliGRAM(s) Oral daily  potassium chloride    Tablet ER 10 milliEquivalent(s) Oral daily  QUEtiapine 300 milliGRAM(s) Oral at bedtime  tiotropium 18 MICROgram(s) Capsule 1 Capsule(s) Inhalation daily  venlafaxine XR. 150 milliGRAM(s) Oral daily  warfarin 1.5 milliGRAM(s) Oral daily    MEDICATIONS  (PRN):  acetaminophen   Tablet .. 650 milliGRAM(s) Oral every 6 hours PRN Temp greater or equal to 38C (100.4F)  ondansetron Injectable 4 milliGRAM(s) IV Push every 6 hours PRN Nausea  sodium chloride 0.65% Nasal 1 Spray(s) Both Nostrils four times a day PRN Nasal Congestion  traMADol 25 milliGRAM(s) Oral every 6 hours PRN Mild Pain (1 - 3)         Vitals log        ICU Vital Signs Last 24 Hrs  T(C): 36.1 (05 Oct 2018 12:04), Max: 36.1 (05 Oct 2018 12:04)  T(F): 97 (05 Oct 2018 12:04), Max: 97 (05 Oct 2018 12:04)  HR: 112 (05 Oct 2018 18:25) (83 - 112)  BP: 124/48 (05 Oct 2018 18:25) (90/60 - 133/71)  BP(mean): --  ABP: --  ABP(mean): --  RR: 20 (05 Oct 2018 18:25) (20 - 20)  SpO2: 96% (05 Oct 2018 18:25) (94% - 97%)           Input and Output:  I&O's Detail      Lab Data                        9.3    8.80  )-----------( 212      ( 05 Oct 2018 12:53 )             29.2     10-05    140  |  111<H>  |  38<H>  ----------------------------<  151<H>  4.7   |  18<L>  |  1.60<H>    Ca    7.9<L>      05 Oct 2018 12:53    TPro  6.9  /  Alb  2.7<L>  /  TBili  0.9  /  DBili  x   /  AST  53<H>  /  ALT  27  /  AlkPhos  99  10-05      ex smoker  lives [place]        Review of Systems	  sob  rich  diarrhea      Objective     Physical Examination    obese  head at  heart s1s2  lung dec BS  cn grossly int  extr chr changes      Pertinent Lab findings & Imaging      Grande:  NO   Adequate UO     I&O's Detail           Discussed with:     Cultures:	        Radiology      EXAM:  CT ABDOMEN AND PELVIS OC                            PROCEDURE DATE:  10/05/2018          INTERPRETATION:  CT abdomen and pelvis without contrast    History abdominal pain and diarrhea    Comparison 09/30/18    Image quality limited by body habitus. Portions of the panniculus are   excluded from the field-of-view.    There is interval development of mild fluffy airspace infiltrate in both   lung bases without segmental consolidation, cavitation or effusion.    The gallbladder is surgically absent. The pancreas is fatty replaced.   Spleen is enlarged, measuring 14.7 cm in AP dimension. The liver and   adrenals and appendix are grossly normal given limitations of a   noncontrast exam. There are low-attenuation lesions in both kidneys   consistent with small cysts. There are small nonobstructive stones on the   right. An IVC filter is in place. There is no bowel dilatation or ascites   or adenopathy or focal inflammation of the peritoneal fat. Urinary   bladder is grossly normal. The prostate is mildly enlarged.    IMPRESSION:    Mild bibasilar pneumonia or pneumonitis, new. No acute abdominal or   pelvic findings                BEN MARSHALL M.D., ATTENDING RADIOLOGIST  This document has been electronically signed. Oct  5 2018  3:35PM

## 2018-10-05 NOTE — H&P ADULT - HISTORY OF PRESENT ILLNESS
presented to er after patient was having many non specific complaints.    patient is poor historian.  per PMD, patient was being worked up diarhea for past few weeks.  In ER patient was found to have possible PNA on CT.  patient also has lactemia on labs.  Patient is being admitted for further work up and treatment.

## 2018-10-05 NOTE — ED ADULT NURSE NOTE - PMH
Acute congestive heart failure, unspecified heart failure type    Acute congestive heart failure, unspecified heart failure type    Angina at rest    Bipolar 1 disorder    Candida infection    Cellulitis, unspecified cellulitis site  abdomen buttocks  Chronic atrial fibrillation    COPD mixed type    Essential hypertension    Gastroesophageal reflux disease, esophagitis presence not specified    Morbid obesity    Myocardial infarct, old    Other pulmonary embolism with acute cor pulmonale, unspecified chronicity    Primary osteoarthritis, unspecified site    Rheumatoid arthritis with positive rheumatoid factor, involving unspecified site

## 2018-10-05 NOTE — H&P ADULT - NSHPPHYSICALEXAM_GEN_ALL_CORE
· CONSTITUTIONAL: Well appearing, morbidly obese, awake, alert, and in no apparent distress.  · ENMT: Airway patent Mouth with normal mucosa  · EYES: Clear bilaterally, pupils equal, round and reactive to light.  · CARDIAC: Normal rate, regular rhythm.  Heart sounds S1, S2.  No murmurs, rubs or gallops.  · RESPIRATORY: Breath sounds clear and equal bilaterally.  · GASTROINTESTINAL: Abdomen soft, non-tender, no guarding.  · MUSCULOSKELETAL: Spine appears normal, range of motion is not limited, no muscle or joint tenderness  · NEUROLOGICAL: no focal deficits, no motor or sensory deficits.

## 2018-10-05 NOTE — H&P ADULT - NSHPREVIEWOFSYSTEMS_GEN_ALL_CORE
· Respiratory [+]: SHORTNESS OF BREATH  · Respiratory [-]: no cough  · GASTROINTESTINAL: - - -  · Gastrointestinal [+]: ABDOMINAL PAIN, DIARRHEA  · Gastrointestinal [-]: no nausea, no vomiting  · ROS STATEMENT: all other ROS negative except as per HPI

## 2018-10-05 NOTE — ED PROVIDER NOTE - OBJECTIVE STATEMENT
pt bib ems from snf for abd pain diarrhea. pt denies pain, only c/o sob. pt poor historian, unable to provide reliable hx.  sadaf - dani

## 2018-10-05 NOTE — ED ADULT NURSE NOTE - NSIMPLEMENTINTERV_GEN_ALL_ED
Implemented All Fall with Harm Risk Interventions:  [place] to call system. Call bell, personal items and telephone within reach. Instruct patient to call for assistance. Room bathroom lighting operational. Non-slip footwear when patient is off stretcher. Physically safe environment: no spills, clutter or unnecessary equipment. Stretcher in lowest position, wheels locked, appropriate side rails in place. Provide visual cue, wrist band, yellow gown, etc. Monitor gait and stability. Monitor for mental status changes and reorient to person, place, and time. Review medications for side effects contributing to fall risk. Reinforce activity limits and safety measures with patient and family. Provide visual clues: red socks.

## 2018-10-05 NOTE — CHART NOTE - NSCHARTNOTEFT_GEN_A_CORE
Resident Rapid Response Note    Patient is a 73y old  Male who presents with a chief complaint of not feeling well, admitted for diarrhea, SOB(05 Oct 2018 19:08)      Rapid response was called at on this 73y Male patient for change in mental status and SOB.    Patient was seen and examined at the bedside by the rapid response team and primary team. ICU PA at bedside. Patient drowsy appearing, mumbling answers to questions initially, O2 increased to 7L and patient responding more appropriately. At baseline A&Ox1-2 as per nursing. VS stable, patient NAD at end of RRT.    Rapid Response Vital Signs:  BP: 112/77  HR: 88  RR: 14   SpO2:97 % on 7L NC  Temp: 98.7 axilla  FS: 168    Vital Signs Last 24 Hrs  T(C): 36.7 (05 Oct 2018 19:56), Max: 36.7 (05 Oct 2018 19:56)  T(F): 98.1 (05 Oct 2018 19:56), Max: 98.1 (05 Oct 2018 19:56)  HR: 100 (05 Oct 2018 22:37) (83 - 112)  BP: 133/81 (05 Oct 2018 22:37) (90/60 - 133/81)  BP(mean): --  RR: 19 (05 Oct 2018 22:37) (18 - 20)  SpO2: 94% (05 Oct 2018 22:37) (93% - 97%)    Physical Exam:  General: Drowsy, lethargic, NAD  HEENT: NCAT, PERRLA, bl, moist mucous membranes   Neurology: A&Ox2, nonfocal, CN II-XII grossly intact, sensation intact   Respiratory: CTA B/L  CV: RRR, +S1/S2, no murmurs, rubs or gallops  Abdominal: Soft, NT, ND +BSx4, obese   Extremities: No C/C/E, + peripheral pulses  Skin: warm, dry, n     LABS:                        9.3    8.80  )-----------( 212      ( 05 Oct 2018 12:53 )             29.2     05 Oct 2018 12:53    140    |  111    |  38     ----------------------------<  151    4.7     |  18     |  1.60     Ca    7.9        05 Oct 2018 12:53  Mg     2.1       05 Oct 2018 20:05    TPro  6.9    /  Alb  2.7    /  TBili  0.9    /  DBili  x      /  AST  53     /  ALT  27     /  AlkPhos  99     05 Oct 2018 12:53    PT/INR - ( 05 Oct 2018 20:05 )   PT: 23.5 sec;   INR: 2.12 ratio         PTT - ( 05 Oct 2018 20:05 )  PTT:33.5 sec    CAPILLARY BLOOD GLUCOSE      POCT Blood Glucose.: 168 mg/dL (05 Oct 2018 23:35)        RADIOLOGY & ADDITIONAL TESTS:      Assessment/Plan: 73y Male patient for lethargy, who is admitted for SOB and diarrhea (r/o c diff), RRT called for AMS/Lethargy  -Patient admitted for SOB, lethargy likely secondary to hypoxemia.   -Nasal cannula O2 increased from 5-7L, patient improved clinically,responding appropriately at end of RRT  -VS and labs reviewed, hemodynamically stable, will draw CBC and ABG. Will consider BiPAP pending results fo ABG  -Continue patient on 7L O2 by NC for now  -Continue to monitor VS, F/u in 2 hours  -D/w Dr Tuttle, aware, agrees with plan

## 2018-10-05 NOTE — CHART NOTE - NSCHARTNOTEFT_GEN_A_CORE
Called by RN for agitation. Recent admission from ER, pt seen and examined at bedside with nursing staff. Pt confused, combative and uncooperative. Labs/ED vitals reviewed. Pt was able to be redirected, does not require restraints presently. Will continue to follow

## 2018-10-05 NOTE — PROVIDER CONTACT NOTE (OTHER) - SITUATION
When turning pt he is hitting when pt is left alone he is not combative pt is AAOx1 pt is morbid obese and incontinent can not get a sputum or urine culture from pt

## 2018-10-05 NOTE — CONSULT NOTE ADULT - PROBLEM SELECTOR RECOMMENDATION 5
copd  ex smoker  o2 support  medical regimen  albuterol QID  Spiriva  seasonal vaccination  keep sat > 88 pct

## 2018-10-05 NOTE — H&P ADULT - NSHPLABSRESULTS_GEN_ALL_CORE
10-06    144  |  115<H>  |  35<H>  ----------------------------<  140<H>  4.1   |  17<L>  |  1.40<H>    Ca    7.0<L>      06 Oct 2018 05:32  Mg     2.1     10-05    TPro  6.9  /  Alb  2.7<L>  /  TBili  0.9  /  DBili  x   /  AST  53<H>  /  ALT  27  /  AlkPhos  99  10-05                            7.5    x     )-----------( x        ( 06 Oct 2018 05:32 )             23.8             LIVER FUNCTIONS - ( 05 Oct 2018 12:53 )  Alb: 2.7 g/dL / Pro: 6.9 g/dL / ALK PHOS: 99 U/L / ALT: 27 U/L / AST: 53 U/L / GGT: x             PT/INR - ( 06 Oct 2018 05:32 )   PT: 25.3 sec;   INR: 2.28 ratio         PTT - ( 06 Oct 2018 05:32 )  PTT:31.9 sec

## 2018-10-06 NOTE — PROVIDER CONTACT NOTE (OTHER) - ASSESSMENT
Pt is acutely confused in and out on consciousness. Unable to obtain BP at first. Dr. Parnell came down pt became AAOx1. ICU PA called to evaluate him. Family came showed DNR/DNI in chart and health care proxy. BP was obtained 80/40. Family stated pt became confused two days ago otherwise was AAOx4. Pt also had slurred speech started a week ago.

## 2018-10-06 NOTE — CONSULT NOTE ADULT - SUBJECTIVE AND OBJECTIVE BOX
73yMale admitted to med/surg with following history:  HPI:  presented to er after patient was having many non specific complaints.    patient is poor historian.  per PMD, patient was being worked up diarhea for past few weeks.  In ER patient was found to have possible PNA on CT.  patient also has lactemia on labs.  Patient is being admitted for further work up and treatment. (05 Oct 2018 18:07)      Psych HPI: Patient seen, evaluated and chart reviewed. Patient at this time is lethargic and is unable to engage.    PAST MEDICAL & SURGICAL HISTORY:  Myocardial infarct, old  Gastroesophageal reflux disease, esophagitis presence not specified  Other pulmonary embolism with acute cor pulmonale, unspecified chronicity  Acute congestive heart failure, unspecified heart failure type  Cellulitis, unspecified cellulitis site: abdomen buttocks  Candida infection  Acute congestive heart failure, unspecified heart failure type  Rheumatoid arthritis with positive rheumatoid factor, involving unspecified site  Primary osteoarthritis, unspecified site  Chronic atrial fibrillation  Bipolar 1 disorder  Angina at rest  COPD mixed type  Essential hypertension  Morbid obesity      Allergies    penicillin (Unknown)  strawberry (Unknown)    Intolerances      MEDICATIONS  (STANDING):  ALBUTerol    0.083% 2.5 milliGRAM(s) Nebulizer every 6 hours  aztreonam  IVPB 2000 milliGRAM(s) IV Intermittent every 8 hours  guaiFENesin  milliGRAM(s) Oral every 12 hours  lactobacillus acidophilus 1 Tablet(s) Oral three times a day with meals  methocarbamol 500 milliGRAM(s) Oral two times a day  metroNIDAZOLE  IVPB      metroNIDAZOLE  IVPB 500 milliGRAM(s) IV Intermittent every 8 hours  tiotropium 18 MICROgram(s) Capsule 1 Capsule(s) Inhalation daily  vancomycin  IVPB 1500 milliGRAM(s) IV Intermittent once  vancomycin  IVPB      venlafaxine XR. 150 milliGRAM(s) Oral daily    MEDICATIONS  (PRN):  acetaminophen  Suppository .. 650 milliGRAM(s) Rectal four times a day PRN Temp greater or equal to 38C (100.4F)  ondansetron Injectable 4 milliGRAM(s) IV Push every 6 hours PRN Nausea  promethazine Suppository 12.5 milliGRAM(s) Rectal four times a day PRN breakthrough nausea / vomiting  sodium chloride 0.65% Nasal 1 Spray(s) Both Nostrils four times a day PRN Nasal Congestion        ROS: Psych: See HPI.  All other systems negative.                          7.5    x     )-----------( x        ( 06 Oct 2018 05:32 )             23.8   06 Oct 2018 05:32    144    |  115    |  35     ----------------------------<  140    4.1     |  17     |  1.40     Ca    7.0        06 Oct 2018 05:32  Mg     2.1       05 Oct 2018 20:05    TPro  6.9    /  Alb  2.7    /  TBili  0.9    /  DBili  x      /  AST  53     /  ALT  27     /  AlkPhos  99     05 Oct 2018 12:53    TSH:     Utox:  Imaging:  Other Tests:    Old Records reviewed:    EXAM:  Vital Signs Last 24 Hrs  T(C): 36.6 (10-06-18 @ 12:07), Max: 37 (10-06-18 @ 11:20)  T(F): 97.9 (10-06-18 @ 12:07), Max: 98.6 (10-06-18 @ 11:20)  HR: 93 (10-06-18 @ 12:07) (77 - 112)  BP: 128/90 (10-06-18 @ 12:07) (92/62 - 133/81)  BP(mean): --  RR: 19 (10-06-18 @ 12:07) (17 - 20)  SpO2: 96% (10-06-18 @ 12:07) (93% - 99%)  Gen Appearance: Poor hygiene and grooming, on oxygen mask  Gait/Station/Muscle Tone: Impaired  MSE limited due to patient's lethargy    DX: Delirium

## 2018-10-06 NOTE — CONSULT NOTE ADULT - SUBJECTIVE AND OBJECTIVE BOX
NEPHROLOGY CONSULTATION    CHIEF COMPLAINT:  Azotemia      HPI:  Admitted yesterday with non specific omplaints.  He has had diarrhea for past few weeks.  His blood work reflects a mild elevation in serum creatinine that was present a month ago.      ROS:  unnable    PAST MEDICAL & SURGICAL HISTORY:  Myocardial infarct, old  Gastroesophageal reflux disease, esophagitis presence not specified  Other pulmonary embolism with acute cor pulmonale, unspecified chronicity  Acute congestive heart failure, unspecified heart failure type  Cellulitis, unspecified cellulitis site: abdomen buttocks  Candida infection  Acute congestive heart failure, unspecified heart failure type  Rheumatoid arthritis with positive rheumatoid factor, involving unspecified site  Primary osteoarthritis, unspecified site  Chronic atrial fibrillation  Bipolar 1 disorder  Angina at rest  COPD mixed type  Essential hypertension  Morbid obesity      SOCIAL HISTORY:  N/A    FAMILY HISTORY:  N/A      MEDICATIONS  (STANDING):  ALBUTerol    0.083% 2.5 milliGRAM(s) Nebulizer every 6 hours  aztreonam  IVPB 2000 milliGRAM(s) IV Intermittent every 8 hours  guaiFENesin  milliGRAM(s) Oral every 12 hours  lactobacillus acidophilus 1 Tablet(s) Oral three times a day with meals  metroNIDAZOLE  IVPB      metroNIDAZOLE  IVPB 500 milliGRAM(s) IV Intermittent every 8 hours  nystatin Powder 1 Application(s) Topical two times a day  tiotropium 18 MICROgram(s) Capsule 1 Capsule(s) Inhalation daily  vancomycin  IVPB      venlafaxine XR. 150 milliGRAM(s) Oral daily      PHYSICAL EXAMINATION:  T(F): 97.9 (10-06-18 @ 12:07)  HR: 93 (10-06-18 @ 14:30)  BP: 128/90 (10-06-18 @ 12:07)  RR: 19 (10-06-18 @ 12:07)  SpO2: 99% (10-06-18 @ 14:30)  Somnolent, in NAD        PERRLA, pink conjunctivae, no ptosis  Good dentition, no pharyngeal erythema  Neck non tender, no mass, no thyromegaly or nodules  Normal respiratory effort, lungs clear to auscultation  Heart with RRR, no murmurs or rubs, no peripheral edema  Abdomen soft, no masses, no organomegaly  Skin no rashes, ulcers or lesions, normal turgor and temperature        LABS:                        7.5    x     )-----------( x        ( 06 Oct 2018 05:32 )             23.8     10-06    144  |  115<H>  |  35<H>  ----------------------------<  140<H>  4.1   |  17<L>  |  1.40<H>    Ca    7.0<L>      06 Oct 2018 05:32  Mg     2.1     10-05    TPro  6.9  /  Alb  2.7<L>  /  TBili  0.9  /  DBili  x   /  AST  53<H>  /  ALT  27  /  AlkPhos  99  10-    Urinalysis Basic - ( 06 Oct 2018 14:53 )    Color: Yellow / Appearance: Clear / S.015 / pH: x  Gluc: x / Ketone: Small  / Bili: Small / Urobili: Negative   Blood: x / Protein: 25 mg/dL / Nitrite: Negative   Leuk Esterase: Trace / RBC: 6-10 /HPF / WBC 0-2   Sq Epi: x / Non Sq Epi: Occasional / Bacteria: Occasional      RADIOLOGY:  CT scan personally reviewed - kidneys look mildly atrophic, non obstructing stones in RK      ASSESSMENT:  1.  Azotemia, probably chronic kidney disease, based on imaging  2.  NAG metabolic acidosis, diarrhea associated    PLAN:  Start oral NaHCO3  No renal workup needed  Monitor daily BMP

## 2018-10-06 NOTE — CHART NOTE - NSCHARTNOTEFT_GEN_A_CORE
Resident Rapid Response Note    Patient is a 73y old  Male who presents with a chief complaint of not feeling well (05 Oct 2018 19:08)      Rapid response was called at on this 73y Male patient for worsening mental status, now unable to arouse    Patient was seen and examined at the bedside by the rapid response team and primary team. Dr. Padilla, ICU PA at bedside. Patient was evaluated, unable to arouse. Recent RRT called for similar presentation, but now worse. Per first RRT f/u note, patient has been on starvation diet for the past 2 weeks as per family, also refusing all medications including psych medications. Patient is s/p 1L NS bolus, with some improvement in previous blood pressure, second bolus started 1L NS. Patient unable to respond to questions. No fever, n/v chest pain.    Rapid Response Vital Signs:  BP: 95/60  HR: 106 afib  RR: 22  SpO2: 98% on 40% ventimask  Temp: 98.6F axillary  FS: 157    Vital Signs Last 24 Hrs  T(C): 36.7 (06 Oct 2018 01:22), Max: 36.7 (05 Oct 2018 19:56)  T(F): 98 (06 Oct 2018 01:22), Max: 98.1 (05 Oct 2018 19:56)  HR: 97 (06 Oct 2018 01:22) (83 - 112)  BP: 133/81 (05 Oct 2018 22:37) (90/60 - 133/81)  BP(mean): --  RR: 19 (06 Oct 2018 01:22) (18 - 20)  SpO2: 99% (06 Oct 2018 01:22) (93% - 99%)    General: Drowsy, unable to arouse  HEENT: NCAT, PERRLA, dry mucous membranes   Neurology: A&Ox0, nonfocal  Respiratory: CTA B/L  CV: RRR, +S1/S2, no murmurs, rubs or gallops  Abdominal: Soft, NT, ND +BSx4, obese   Extremities: B/L LE edema, poor capillary refill, + peripheral pulses  Skin: warm, dry, areas of ecchymosis b/l upper and lower extremities      LABS:                        9.3    8.62  )-----------( 182      ( 06 Oct 2018 00:22 )             29.4     05 Oct 2018 12:53    140    |  111    |  38     ----------------------------<  151    4.7     |  18     |  1.60     Ca    7.9        05 Oct 2018 12:53  Mg     2.1       05 Oct 2018 20:05    TPro  6.9    /  Alb  2.7    /  TBili  0.9    /  DBili  x      /  AST  53     /  ALT  27     /  AlkPhos  99     05 Oct 2018 12:53    PT/INR - ( 05 Oct 2018 20:05 )   PT: 23.5 sec;   INR: 2.12 ratio         PTT - ( 05 Oct 2018 20:05 )  PTT:33.5 sec    CAPILLARY BLOOD GLUCOSE      POCT Blood Glucose.: 157 mg/dL (06 Oct 2018 04:03)  POCT Blood Glucose.: 168 mg/dL (05 Oct 2018 23:35)    ABG - ( 06 Oct 2018 02:28 )  pH, Arterial: 7.38  pH, Blood: x     /  pCO2: 28    /  pO2: 77    / HCO3: 18    / Base Excess: -7.8  /  SaO2: 94                  RADIOLOGY & ADDITIONAL TESTS:      Assessment/Plan:  Rapid response was called at on this 73y Male patient for worsening mental status, now unable to arouse.   -worsening AMS compared to last RRT, etiology remains unclear - Ddx includes: psych meds withdrawal vs CVA vs metabolic encephalopathy vs cerebral hypoperfusion  - hypotension possibly 2/2 hypovolemia in setting of poor PO intake, responding well to 1l NS bolus, SBP improved to 120s, now receiving 2nd bolus  - continue on ventimask at 40% fio2  -ABG from prior rapid reviewed, WNL, no concern for CO2 retention  - Stat CT head performed, pending official read  -ICU present at bedside, no additional recs at this time  - Call placed to Dr. Tuttle awaiting call back.

## 2018-10-06 NOTE — CHART NOTE - NSCHARTNOTEFT_GEN_A_CORE
Provider called by RN due to concern trying to straight cath patient.     Patient retaining and w/ recent AMS overnight being evaluated for possible UTI, UA ordered with need for straight cath. Multiple attempts made by residents and two nurses for bladder scan prior to straight cath without success, 2/2 to pannus of patient. Patient's genital anatomy difficult, penis uncircumcised with lots of skin overlaying as well as panus covering pelvis. Decision was made to place curtis catheter as patient retaining/oliguric for over 12 hours and s/p at least 3250 ml NS.     Curtis was successfully placed with an immediate 1 L output. Urine collected and sent for UA.     T(C): 36.6 (10-06-18 @ 12:07), Max: 37 (10-06-18 @ 11:20)  HR: 93 (10-06-18 @ 12:07) (77 - 112)  BP: 128/90 (10-06-18 @ 12:07) (92/62 - 133/81)  RR: 19 (10-06-18 @ 12:07) (17 - 20)  SpO2: 96% (10-06-18 @ 12:07) (93% - 99%)    GENERAL: patient appears well, no acute distress, appropriate, pleasant  LUNGS: patient breathing comfortably, nonlabored without use of accessory muscles  HEART: no murmurs noted   GASTROINTESTINAL: abdomen with open wounds on abdomen, very large panus with towel overlaying  NEUROLOGIC: patient asleep but arousable by voice,  no obvious sensory deficits        Assessment    72 yo M PMHx of multiple comorbidities including GERD, MI, CHF, afib, COPD, morbid obesity and recent episode of AMS last night now retaining urine and s/p curtis placement with specimen sent for UA.     Plan  - monitor urine output  - primary team to f/u UA

## 2018-10-06 NOTE — CONSULT NOTE ADULT - SUBJECTIVE AND OBJECTIVE BOX
All records reviewed.    HPI:  74 y/o man w morbid obesity, bipolar disorder, decubs, resident of SNF, bedbound for 2 years, being w/u for diarrhea last few weeks, stopped eating/drinking to lose weight, brought in for incr lethargy/abdomen pain/diarrhea. Was seen in [facility] 9/30 for similar findings, CT c/a/p done only sig for enlarged prostate.  CT head no acute findings, CT a/p w bibasilar pneumonia.  Seen by Neuro/Cardiology/ID/Pulm/Psych, started on antibiotics  CBC on 9/30 w Hgb 11.3, on 10/5 in ER 9.3, this AM 7.5  Normal MCV, WBC, platelets  eGFR ~50  INR 2+, normal BPG7ogl on Coumadin)  Thyroid function not deficient, iron studies c/w acute illness  Stool occult blood positive  Type and cross no antibodies      PAST MEDICAL & SURGICAL HISTORY:  Myocardial infarct, old  Gastroesophageal reflux disease, esophagitis presence not specified  Other pulmonary embolism with acute cor pulmonale, unspecified chronicity  Acute congestive heart failure, unspecified heart failure type  Cellulitis, unspecified cellulitis site: abdomen buttocks  Candida infection  Acute congestive heart failure, unspecified heart failure type  Rheumatoid arthritis with positive rheumatoid factor, involving unspecified site  Primary osteoarthritis, unspecified site  Chronic atrial fibrillation  Bipolar 1 disorder  Angina at rest  COPD mixed type  Essential hypertension  Morbid obesity      Review of System:  unable to give    MEDICATIONS  (STANDING):  ALBUTerol    0.083% 2.5 milliGRAM(s) Nebulizer every 6 hours  aztreonam  IVPB 2000 milliGRAM(s) IV Intermittent every 8 hours  guaiFENesin  milliGRAM(s) Oral every 12 hours  lactobacillus acidophilus 1 Tablet(s) Oral three times a day with meals  methocarbamol 500 milliGRAM(s) Oral two times a day  metroNIDAZOLE  IVPB      metroNIDAZOLE  IVPB 500 milliGRAM(s) IV Intermittent every 8 hours  tiotropium 18 MICROgram(s) Capsule 1 Capsule(s) Inhalation daily  vancomycin  IVPB      venlafaxine XR. 150 milliGRAM(s) Oral daily    MEDICATIONS  (PRN):  acetaminophen  Suppository .. 650 milliGRAM(s) Rectal four times a day PRN Temp greater or equal to 38C (100.4F)  ondansetron Injectable 4 milliGRAM(s) IV Push every 6 hours PRN Nausea  promethazine Suppository 12.5 milliGRAM(s) Rectal four times a day PRN breakthrough nausea / vomiting  sodium chloride 0.65% Nasal 1 Spray(s) Both Nostrils four times a day PRN Nasal Congestion      Allergies    penicillin (Unknown)  strawberry (Unknown)    Intolerances        SOCIAL HISTORY:  unable to give    FAMILY HISTORY:  No pertinent family history in first degree relatives      Vital Signs Last 24 Hrs  T(C): 36.6 (06 Oct 2018 12:07), Max: 37 (06 Oct 2018 11:20)  T(F): 97.9 (06 Oct 2018 12:07), Max: 98.6 (06 Oct 2018 11:20)  HR: 93 (06 Oct 2018 12:07) (77 - 112)  BP: 128/90 (06 Oct 2018 12:07) (92/62 - 133/81)  BP(mean): --  RR: 19 (06 Oct 2018 12:07) (17 - 20)  SpO2: 96% (06 Oct 2018 12:07) (93% - 99%)    PHYSICAL EXAM:      General:overweight ill appearing man, moans a little when provoked when otherwise does not respond  Neck:supple, trachea midline  Lungs:clear, no wheeze/rhonchi  Cardiovascular:regular rate and rhythm, S1 S2  Abdomen:soft, obese, nontender, no organomegaly present, bowel sounds normal  Neurological:lethargic, see above.  Skin:jannette pretibial ecchymosis  Lymph Nodes:no palpable cervical/supraclavicular lymph nodes enlargements  Extremities:trace jannette legs edema        LABS:                        7.5    x     )-----------( x        ( 06 Oct 2018 05:32 )             23.8     10-06 @ 05:32  WBC--  RBC-- Hgb7.5 Hct23.8  MCV--  Plts--  Auto Neutro-- Band-- Auto Lymph-- Atypical Lymph-- Reactive Lymph-- Auto Mono-- Auto Eos-- Auto Baso--        10-06 @ 00:22  WBC8.62  RBC3.30 Hgb9.3 Hct29.4  MCV89.1  [id number]  Auto Neutro-- Band-- Auto Lymph-- Atypical Lymph-- Reactive Lymph-- Auto Mono-- Auto Eos-- Auto Baso--        10-05 @ 12:53  WBC8.80  RBC3.30 Hgb9.3 Hct29.2  MCV88.5  [id number]  Auto Nsckoi59.0 Band-- Auto Lymph10.0 Atypical Lymph-- Reactive Lymph-- Auto Mono4.0 Auto Eos0.0 Auto Baso0.0          10-06    144  |  115<H>  |  35<H>  ----------------------------<  140<H>  4.1   |  17<L>  |  1.40<H>    Ca    7.0<L>      06 Oct 2018 05:32  Mg     2.1     10-05    TPro  6.9  /  Alb  2.7<L>  /  TBili  0.9  /  DBili  x   /  AST  53<H>  /  ALT  27  /  AlkPhos  99  10-05    10-06 @ 05:32  PT25.3 INR2.28  PTT31.9  10-05 @ 20:05  PT23.5 INR2.12  PTT33.5  10-05 @ 13:23  PT-- INR--  PTT32.4    Iron with Total Binding Capacity (10.05.18 @ 22:13)    Iron - Total Binding Capacity.: 296 ug/dL    % Saturation, Iron: 46 %    Iron Total, Serum: 135: Mild hemolysis. Results may be falsely elevated. ug/dL    Unsaturated Iron Binding Capacity: 161 ug/dL    Ferritin, Serum: 427 ng/mL (10.05.18 @ 22:13)    Thyroid Stimulating Hormone, Serum: 3.10 uIU/mL (10.05.18 @ 20:27)    Triiodothyronine, Total (T3 Total): 44 ng/dL (10.05.18 @ 22:13)            PERIPHERAL BLOOD SMEAR REVIEW:  RBC-normocytic, normochromic, mild anisocytosis w occ sl more microcytic cells, no sig poikilocytosis. No rouleaux/schistocytes or increased polychromasia.  WBC-normal in differential and morphology, no immature or abnormal cells seen.  Platelets-adequate on smear, no platelets clumping or giant platelets.       RADIOLOGY & ADDITIONAL STUDIES:  < from: CT Head No Cont (10.06.18 @ 04:40) >    EXAM:  CT BRAIN                            PROCEDURE DATE:  10/06/2018          INTERPRETATION:  10/6/2018 5:10 AM    CLINICAL HISTORY:  Altered mental status.    TECHNIQUE: Axial CT images are obtained from the cranial vertex to the   skullbase without the administration of IV contrast.    COMPARISON: None    FINDINGS:    There is volume loss accounting for prominent ventricles. There is   atherosclerosis. White matter hypodensities noted compatible with   moderate chronic microvascular ischemic change in this age group. There   is no midline shift, mass effect, extra axial collection, or intracranial   hemorrhage.    The calvarium is intact. The visualized paranasal sinuses are aerated.   The mastoid air cells are clear.    IMPRESSION:    No acute hemorrhage or mass effect. Chronic changes noted    < end of copied text >  < from: CT Abdomen and Pelvis w/ Oral Cont (10.05.18 @ 15:22) >    EXAM:  CT ABDOMEN AND PELVIS OC                            PROCEDURE DATE:  10/05/2018          INTERPRETATION:  CT abdomen and pelvis without contrast    History abdominal pain and diarrhea    Comparison 09/30/18    Image quality limited by body habitus. Portions of the panniculus are   excluded from the field-of-view.    There is interval development of mild fluffy airspace infiltrate in both   lung bases without segmental consolidation, cavitation or effusion.    The gallbladder is surgically absent. The pancreas is fatty replaced.   Spleen is enlarged, measuring 14.7 cm in AP dimension. The liver and   adrenals and appendix are grossly normal given limitations of a   noncontrast exam. There are low-attenuation lesions in both kidneys   consistent with small cysts. There are small nonobstructive stones on the   right. An IVC filter is in place. There is no bowel dilatation or ascites   or adenopathy or focal inflammation of the peritoneal fat. Urinary   bladder is grossly normal. Theprostate is mildly enlarged.    IMPRESSION:    Mild bibasilar pneumonia or pneumonitis, new. No acute abdominal or     < end of copied text >

## 2018-10-06 NOTE — CHART NOTE - NSCHARTNOTEFT_GEN_A_CORE
Called by RN for pt having AMS. Pt seen and examined at bedside, RRT called prior for similar symptom. Pt now more acutely confused, altered, now with slurred speech and more difficult to arouse.  SBP in the 85s, sating well on 7 NC, very dry on exam, heart: tachy with s1s2, no murmurs, limited lung exam per body habitus, grossly clear b/l anteriorly. Chart reviewed H&P incomplete, minimal information regarding admission. Pt family now at bedside, spoke with sister and brother-in-law, per family pt has been having progressively mental status decline since earlier this week but nothing as acute as current presentation. Per family, pt has been starving himself and refusing to eat on purpose to lose weight for last 2 weeks, has also been refusing meds for last 2 weeks at [facility].  Unclear of pt mental status on admission, labs and imaging reviewed.      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  - unclear etiology of AMS, ?2/2 psych         Call placed to Dr. Tuttle awaiting call back. Called by RN for pt having AMS. Pt seen and examined at bedside, RRT called prior for similar symptom. Pt now more acutely confused, altered, now with slurred speech and more difficult to arouse.  SBP in the 85s, sating well on 7 NC, dry on exam, cap refill >3 secs, cold extremities heart: tachy with s1s2, no murmurs, limited lung exam per body habitus, grossly clear b/l anteriorly. Chart reviewed H&P extremely limited, minimal information regarding admission. Pt family now at bedside, hx obtained per family, spoke with sister and brother-in-law, per family pt has been having progressively mental status decline since earlier this week but nothing as acute as current presentation. Per family, pt has been starving himself and refusing to eat on purpose to lose weight for last 2 weeks, has also been refusing meds for last 2 weeks at [facility].  Unclear of pt mental status on admission, labs and imaging reviewed.      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  - unclear etiology of AMS, ?2/2 psych meds withdrawal vs CVA vs metabolic encephalopathy  - start vent mask at 40% fio2  - stat abg ordered/reviewed, not retaining co2, no need for bipap presently  - Will give 1 L ns bolus, possible need for pressors  - Stat CT head ordered, f/u results  - Discussed plan with nursing/staff  - Call placed to Dr. Tuttle awaiting call back. Called by RN for pt having AMS. Pt seen and examined at bedside, RRT called prior for similar symptom. Pt now more acutely confused, altered, now with slurred speech and more difficult to arouse.  SBP in the 85s, sating well on 7 NC, dry on exam, cap refill >3 secs, cold extremities heart: tachy with s1s2, no murmurs, limited lung exam per body habitus, grossly clear b/l anteriorly. Chart reviewed. H&P and ED note limited hx and minimal information regarding admission. Pt family now arrived at bedside, hx obtained per family, spoke with sister and brother-in-law, per family pt has been having progressive mental status decline since earlier this week but nothing as acute as current presentation. Per family, pt has been starving himself and refusing to eat on purpose to lose weight for last 2 weeks, has also been refusing meds for last 2 weeks at [facility].  Unclear of pt mental status on admission, admission labs/imaging reviewed.      Assessment: 72 yo m long term nursing home resident with multiple comorbidities admitted for pna, now with severe altered mental status, slurred speech, difficult arousal and hypotension.  - unclear etiology of AMS, ?2/2 psych meds withdrawal vs CVA vs metabolic encephalopathy vs cerebral hypoperfusion  - hypotension possibly 2/2 hypovolemia in setting of poor PO intake  - start vent mask at 40% fio2  - stat abg ordered/reviewed, not retaining co2, no need for bipap presently  - Will give 1 L ns bolus, possible need for pressors  - Stat CT head ordered  - Discussed plan with nursing/staff  - Call placed to Dr. Tuttle awaiting call back.

## 2018-10-06 NOTE — DIETITIAN INITIAL EVALUATION ADULT. - OTHER INFO
73 yr old morbidly obese male with extensive pmhx as below admitted with SOB, AMS, abdominal pain and diarrhea. Noted, pt was not eating for two weeks PTA in attempt to lose wt. Pt is being treated for multiorgan failure . skin - no pressure injuries documented. LE edema is evident. Pt is not learner ready for Coumadin education. Probiotic is appropriate in view of antibiotic use. hx of DM not seen. In view of obesity and ^ glucose levels, suspicious of pre DM although might be related to stress response. CPR ^ c/w inflammation/stress. Per nursing, no diarrhea at present as pt has not been eating due to respiratory difficulty and AMS.

## 2018-10-06 NOTE — CONSULT NOTE ADULT - ASSESSMENT
Mr. Mills is a 73 year old male with HTN, and documented MI and congestive heart failure, here with altered mental status.  Unclear etiology of symptoms though Likely all metabolic in origin. He has been starving himself for the last 2 weeks and has had significant diarrhea  Labs notable for elevated BNP, with some mild overload on exam    - No sign of acute ischemia. EKG is sinus tachycardia with APCs. Continue to watch on telemetry  - On coumadin for DVT/thrombophlebitis. No known history of AF  - Check echocardiogram to define LV function  - Hold off on diuresis at this time  - Watch creatinine and electrolytes. Keep K>4, Mg>2  - Abdominal CT with possible LL PNA. CXR is pending  - Drop in Hb noted. FOBT +. Would transfuse to keep Hb>8  - Hold anti-HTN medications  - Will follow with you.
74 y/o man w morbid obesity, bipolar disorder, decubs, resident of SNF, bedbound for 2 years, being w/u for diarrhea last few weeks, stopped eating/drinking to lose weight, brought in for incr lethargy/abdomen pain/diarrhea. Was seen in [facility] 9/30 for similar findings, CT c/a/p done only sig for enlarged prostate.  CT head no acute findings, CT a/p w bibasilar pneumonia.  Seen by Neuro/Cardiology/ID/Pulm/Psych, started on antibiotics  CBC on 9/30 w Hgb 11.3, on 10/5 in ER 9.3, this AM 7.5  Normal MCV, WBC, platelets  eGFR ~50  INR 2+, normal GAO8imt on Coumadin)  Thyroid function not deficient, iron studies c/w acute illness  Stool occult blood positive  Type and cross no antibodies  Nayeli blood smear morphology no sig pathology    -anemia/precipitous drop in Hct from 11.3 9/30 chanel 9.3 10/5 to 7.5 today-element of GI blood loss but no BRBPR and CT a/p no collection, no signs of hemolysis. Suspect due to acute illness, ?sepsis, poor compensation during times of stress, with elements of baseline anemia due to chronic disease and renal insufficiency. Will check B12 and folate as well. Agree w transfusion PRBC  -follow serial CBC  -treat acute illness/?pneumonia    further heme recommendations pending above  thank you, will follow w you.
Patient is 1) cognitively impaired 2) Does not nderstand the nature of the medical condition, risks, benefits, alternatives and side-effects of treatment 3) Unable to make decisions voluntarily.  At this time patient has no decision making capacity regarding medical decisions.

## 2018-10-06 NOTE — PROGRESS NOTE ADULT - PROBLEM SELECTOR PLAN 1
multiple medical issues, copd, heart failure, obesity, oskar, anemia,   will check Ammonia level now  will cont NEBS  will cont ABX for poss PNA  on AC - INR noted  anemia possibly due to hemodillution   oral hygiene  asp prec  NIPPV use as tolerated, ABG noted  keep sat > 88 pct  overall prognosis is poor  pt is DNR DNI  discussed with overnight team and ICU RPA C  will follow

## 2018-10-06 NOTE — CONSULT NOTE ADULT - SUBJECTIVE AND OBJECTIVE BOX
[facility] - Madai Farah, Lisa, Kalyan, Paul Wiley  Office Number: [phone]    Initial Consult Note    CHIEF COMPLAINT: Patient is a 73y old  Male who presents with a chief complaint of not feeling well (06 Oct 2018 07:59)      HPI:  presented to er after patient was having many non specific complaints.    patient is poor historian.  per PMD, patient was being worked up diarhea for past few weeks.  In ER patient was found to have possible PNA on CT.  patient also has lactemia on labs.  Patient is being admitted for further work up and treatment. (05 Oct 2018 18:07)    on a/c for thrombophlebitis.  Was told he may have AF, though was in SR.  Unable to obtain interval history.      PAST MEDICAL & SURGICAL HISTORY:  Myocardial infarct, old  Gastroesophageal reflux disease, esophagitis presence not specified  Other pulmonary embolism with acute cor pulmonale, unspecified chronicity  Acute congestive heart failure, unspecified heart failure type  Cellulitis, unspecified cellulitis site: abdomen buttocks  Candida infection  Acute congestive heart failure, unspecified heart failure type  Rheumatoid arthritis with positive rheumatoid factor, involving unspecified site  Primary osteoarthritis, unspecified site  Chronic atrial fibrillation  Bipolar 1 disorder  Angina at rest  COPD mixed type  Essential hypertension  Morbid obesity      SOCIAL HISTORY:  No tobacco, ethanol, or drug abuse.    FAMILY HISTORY:  + colon ca in siblings    MEDICATIONS  (STANDING):  ALBUTerol    0.083% 2.5 milliGRAM(s) Nebulizer every 6 hours  aztreonam  IVPB 1000 milliGRAM(s) IV Intermittent every 6 hours  guaiFENesin  milliGRAM(s) Oral every 12 hours  lactobacillus acidophilus 1 Tablet(s) Oral three times a day with meals  methocarbamol 500 milliGRAM(s) Oral two times a day  metroNIDAZOLE  IVPB      metroNIDAZOLE  IVPB 500 milliGRAM(s) IV Intermittent every 8 hours  QUEtiapine 300 milliGRAM(s) Oral at bedtime  tiotropium 18 MICROgram(s) Capsule 1 Capsule(s) Inhalation daily  venlafaxine XR. 150 milliGRAM(s) Oral daily    MEDICATIONS  (PRN):  acetaminophen  Suppository .. 650 milliGRAM(s) Rectal four times a day PRN Temp greater or equal to 38C (100.4F)  ondansetron Injectable 4 milliGRAM(s) IV Push every 6 hours PRN Nausea  promethazine Suppository 12.5 milliGRAM(s) Rectal four times a day PRN breakthrough nausea / vomiting  sodium chloride 0.65% Nasal 1 Spray(s) Both Nostrils four times a day PRN Nasal Congestion      Allergies    penicillin (Unknown)  strawberry (Unknown)    Intolerances        REVIEW OF SYSTEMS:    CONSTITUTIONAL: + weakness, no fevers or chills  EYES/ENT: No visual changes;  No vertigo or throat pain   NECK: No pain or stiffness  RESPIRATORY: No cough, wheezing, hemoptysis; + shortness of breath  CARDIOVASCULAR: No chest pain or palpitations  GASTROINTESTINAL: No abdominal pain. No nausea, vomiting, or hematemesis; + diarrhea, no constipation. No melena or hematochezia.  GENITOURINARY: No dysuria, frequency or hematuria  NEUROLOGICAL: No numbness or weakness  SKIN: No itching or rash  All other review of systems is negative unless indicated above    VITAL SIGNS:   Vital Signs Last 24 Hrs  T(C): 36.9 (06 Oct 2018 08:27), Max: 36.9 (06 Oct 2018 08:27)  T(F): 98.4 (06 Oct 2018 08:27), Max: 98.4 (06 Oct 2018 08:27)  HR: 85 (06 Oct 2018 08:27) (83 - 112)  BP: 99/65 (06 Oct 2018 08:27) (90/60 - 133/81)  BP(mean): --  RR: 17 (06 Oct 2018 08:27) (17 - 20)  SpO2: 96% (06 Oct 2018 08:27) (93% - 99%)    I&O's Summary    06 Oct 2018 07:01  -  06 Oct 2018 10:10  --------------------------------------------------------  IN: 100 mL / OUT: 0 mL / NET: 100 mL        On Exam:    Constitutional: obese, responsive only to sternal rub  Lungs:  Non-labored, coarse BS bilaterally, no rhonchi or crackles  Cardiovascular: tachycardic,  S1 and S2 positive.  No murmurs, rubs, gallops or clicks  Gastrointestinal: Bowel Sounds present, soft, nontender.   Lymph: + peripheral edema, with wounds bilaterally. No cervical lymphadenopathy.  Neurological: Unable to assess  Skin: darkened areas of LE s   Psych:  Not responsive    LABS: All Labs Reviewed:                        7.5    x     )-----------( x        ( 06 Oct 2018 05:32 )             23.8                         9.3    8.62  )-----------( 182      ( 06 Oct 2018 00:22 )             29.4                         9.3    8.80  )-----------( 212      ( 05 Oct 2018 12:53 )             29.2     06 Oct 2018 05:32    144    |  115    |  35     ----------------------------<  140    4.1     |  17     |  1.40   05 Oct 2018 12:53    140    |  111    |  38     ----------------------------<  151    4.7     |  18     |  1.60     Ca    7.0        06 Oct 2018 05:32  Ca    7.9        05 Oct 2018 12:53  Mg     2.1       05 Oct 2018 20:05    TPro  6.9    /  Alb  2.7    /  TBili  0.9    /  DBili  x      /  AST  53     /  ALT  27     /  AlkPhos  99     05 Oct 2018 12:53    PT/INR - ( 06 Oct 2018 05:32 )   PT: 25.3 sec;   INR: 2.28 ratio         PTT - ( 06 Oct 2018 05:32 )  PTT:31.9 sec      Blood Culture:   10-05 @ 20:27  Pro Bnp [id number]    10-05 @ 20:27  TSH: 3.10  10-05 @ 20:05  TSH: 3.27      RADIOLOGY:    EKG: ST/APCs

## 2018-10-06 NOTE — CONSULT NOTE ADULT - SUBJECTIVE AND OBJECTIVE BOX
Infectious Diseases Consult by Derrek Corbin MD    Reason for Consult :    HPI:   Patient is poor historian., All hx per his sister Mona Slater ( HCP )  who is at bedside   73 year old male hx of morbid obesity, bipolar disorder a LTC resident of [facility], bed bound for over 2 years sent in to ER with complaints of increasing SOB, abdominal pains and diarrhea and increased lethargy . He was seen with same complaints at [facility] on 9/30/18 , had CTA chest and abdomen and was dc back after negative results . He stopped eating and drinking for past few weeks as he was trying to loose weight .    In Er he was obtunded  he was seen by Neurology, cardiology and Pulmonary, was placed on C diff precautions as he was being worked up for diarrhea for past 2 weeks . Admitted for further work up as possible pneumonia . he has multiple DU and superficial wounds from scratch marks       Past Medical & Surgical Hx:  PAST MEDICAL & SURGICAL HISTORY:  Myocardial infarct, old  Gastroesophageal reflux disease, esophagitis presence not specified  Other pulmonary embolism with acute cor pulmonale, unspecified chronicity  Acute congestive heart failure, unspecified heart failure type  Cellulitis, unspecified cellulitis site: abdomen buttocks  Candida infection  Acute congestive heart failure, unspecified heart failure type  Rheumatoid arthritis with positive rheumatoid factor, involving unspecified site  Primary osteoarthritis, unspecified site  Chronic atrial fibrillation  Bipolar 1 disorder  Angina at rest  COPD mixed type  Essential hypertension  Morbid obesity      Social History-- Disabled, LTC resident    EtOH: denies   Tobacco: denies   Drug Use: denies       FAMILY HISTORY:  No pertinent family history in first degree relatives      Allergies    penicillin (Unknown)  strawberry (Unknown)    Intolerances        Home/ Out patient  Medications :  Home Medications:  acetaminophen 500 mg oral tablet: 2 tab(s) orally every 8 hours (29 Sep 2018 22:53)  Acidophilus: 100 milligram(s) orally 2 times a day (29 Sep 2018 22:53)  Coumadin: 1.5 milligram(s) orally once a day (at bedtime) (29 Sep 2018 22:53)  Deep Sea Nasal Spray 0.65% nasal spray: 2 spray(s) nasal 2 times a day (29 Sep 2018 22:53)  gabapentin 600 mg oral tablet: 1 tab(s) orally 3 times a day (29 Sep 2018 22:53)  hydrALAZINE 50 mg oral tablet: orally every 8 hours (29 Sep 2018 22:53)  Imodium Multi-Symptom Relief 2 mg-125 mg oral tablet: orally 4 times a day, As Needed (29 Sep 2018 22:53)  ipratropium-albuterol 0.5 mg-2.5 mg/3 mLinhalation solution: inhaled every 6 hours (29 Sep 2018 22:53)  loperamide 2 mg oral capsule: orally every other day (29 Sep 2018 22:53)  methocarbamol 500 mg oral tablet: orally 2 times a day (29 Sep 2018 22:53)  Metoprolol Succinate ER: 400 milligram(s) orally once a day (29 Sep 2018 22:53)  Mucinex 600 mg oral tablet, extended release: 1 tab(s) orally every 12 hours (29 Sep 2018 22:53)  potassium chloride 10 mEq oral capsule, extended release: 4 cap(s) orally 2 times a day (29 Sep 2018 22:53)  QUEtiapine 300 mg oral tablet: orally once a day (at bedtime) (29 Sep 2018 22:53)  Robitussin Cold and Cough oral liquid: 5 milliliter(s) orally every 6 hours (29 Sep 2018 22:53)  traMADol 50 mg oral tablet: orally every 6 hours, As Needed (29 Sep 2018 22:53)  venlafaxine 150 mg oral capsule, extended release: 1 cap(s) orally once a day (29 Sep 2018 22:53)  Zithromax 250 mg oral tablet: orally once a day (29 Sep 2018 22:53)      Current Inpatient Medications :    ANTIBIOTICS:   aztreonam  IVPB 1000 milliGRAM(s) IV Intermittent every 6 hours  metroNIDAZOLE  IVPB      metroNIDAZOLE  IVPB 500 milliGRAM(s) IV Intermittent every 8 hours      OTHER RELEVANT MEDICATIONS :  acetaminophen  Suppository .. 650 milliGRAM(s) Rectal four times a day PRN  ALBUTerol    0.083% 2.5 milliGRAM(s) Nebulizer every 6 hours  guaiFENesin  milliGRAM(s) Oral every 12 hours  methocarbamol 500 milliGRAM(s) Oral two times a day  ondansetron Injectable 4 milliGRAM(s) IV Push every 6 hours PRN  promethazine Suppository 12.5 milliGRAM(s) Rectal four times a day PRN  sodium chloride 0.65% Nasal 1 Spray(s) Both Nostrils four times a day PRN  tiotropium 18 MICROgram(s) Capsule 1 Capsule(s) Inhalation daily  venlafaxine XR. 150 milliGRAM(s) Oral daily      ROS:  Unable to obtain due to : as he was obtunded       I&O's Detail    06 Oct 2018 07:01  -  06 Oct 2018 11:58  --------------------------------------------------------  IN:    IV PiggyBack: 100 mL  Total IN: 100 mL    OUT:  Total OUT: 0 mL    Total NET: 100 mL          Physical Exam:  Vital Signs Last 24 Hrs  T(C): 37 (06 Oct 2018 11:20), Max: 37 (06 Oct 2018 11:20)  T(F): 98.6 (06 Oct 2018 11:20), Max: 98.6 (06 Oct 2018 11:20)  HR: 77 (06 Oct 2018 11:20) (77 - 112)  BP: 92/62 (06 Oct 2018 11:20) (90/60 - 133/81)  BP(mean): --  RR: 18 (06 Oct 2018 11:20) (17 - 20)  SpO2: 98% (06 Oct 2018 11:20) (93% - 99%)  Height (cm): 172.72 (10-05 @ 12:04)  Weight (kg): 181 (10-05 @ 12:04)  BMI (kg/m2): 60.7 (10-05 @ 12:04)  BSA (m2): 2.74 (10-05 @ 12:04)    General: well developed well nourished, in no acute distress  Eyes: sclera anicteric, pupils equal and reactive to light  ENMT: buccal mucosa moist, pharynx not injected  Neck: supple, trachea midline  Lungs: clear, no wheeze/rhonchi  Cardiovascular: regular rate and rhythm, S1 S2  Abdomen: soft, nontender, no organomegaly present, bowel sounds normal  Neurological:  alert and oriented x3, Cranial Nerves II-XII grossly intact  Skin: no increased ecchymosis/petechiae/purpura  Lymph Nodes: no palpable cervical/supraclavicular lymph nodes enlargements  Extremities: no cyanosis/clubbing/edema    Labs:                          7.5    x     )----------( x        ( 06 Oct 2018 05:32 )           23.8     Complete Blood Count (10.06.18 @ 00:22)    Nucleated RBC: 0 /100 WBCs    WBC Count: 8.62 K/uL    RBC Count: 3.30 M/uL    Hemoglobin: 9.3 g/dL    Hematocrit: 29.4 %    Mean Cell Volume: 89.1 fl    Mean Cell Hemoglobin: 28.2 pg    Mean Cell Hemoglobin Conc: 31.6 gm/dL    Red Cell Distrib Width: 21.3 %    Platelet Count - Automated: 182 K/uL        144    |  115    |  35     ----------------------------<  140        ( 06 Oct 2018 05:32 )  4.1     |  17     |  1.40     Ca    7.0        ( 06 Oct 2018 05:32 )  Mg     2.1       ( 05 Oct 2018 20:05 )        PT/INR -  25.3 sec / 2.28 ratio   ( 06 Oct 2018 05:32 )       PTT -  31.9 sec   ( 06 Oct 2018 05:32 )    Lactate, Blood: 1.6 mmol/L (10-06-18 @ 05:32)  Lactate, Blood: 2.3 mmol/L (10-05-18 @ 17:14)      Procalcitonin, Serum (10.05.18 @ 20:05)    Procalcitonin, Serum: 0.81:     Serum Pro-Brain Natriuretic Peptide (10.05.18 @ 20:27)    Serum Pro-Brain Natriuretic Peptide: 54057 pg/mL        ABG - ( 06 Oct 2018 02:28 )  pH, Arterial: 7.38  pH, Blood: x     /  pCO2: 28    /  pO2: 77    / HCO3: 18    / Base Excess: -7.8  /  SaO2: 94          RECENT CULTURES:      RADIOLOGY & ADDITIONAL STUDIES:    Xray Chest 1 View- PORTABLE-Routine (10.06.18 @ 10:18) >  Shallow inspiration, elevated diaphragm .  There are prominent   bronchovascular markings in the perihilar regions, this could be related   to vascular congestion although findings may be accentuated due to the   patient's body habitus. Heart size within normal limits. No acute osseous   abnormalities.    Recommend a follow-up exam with deeper inspiration if clinically   applicable.    IMPRESSION: See above report      CT Head No Cont (10.06.18 @ 04:40) >  FINDINGS:    There is volume loss accounting for prominent ventricles. There is   atherosclerosis. White matter hypodensities noted compatible with   moderate chronic microvascular ischemic change in this age group. There   is no midline shift, mass effect, extra axial collection, or intracranial   hemorrhage.    The calvarium is intact. The visualized paranasal sinuses are aerated.   The mastoid air cells are clear.    IMPRESSION:    No acute hemorrhage or mass effect. Chronic changes noted      CT Abdomen and Pelvis w/ Oral Cont (10.05.18 @ 15:22) >  Image quality limited by body habitus. Portions of the panniculus are   excluded from the field-of-view.    There is interval development of mild fluffy airspace infiltrate in both   lung bases without segmental consolidation, cavitation or effusion.    The gallbladder is surgically absent. The pancreas is fatty replaced.   Spleen is enlarged, measuring 14.7 cm in AP dimension. The liver and   adrenals and appendix are grossly normal given limitations of a   noncontrast exam. There are low-attenuation lesions in both kidneys   consistent with small cysts. There are small nonobstructive stones on the   right. An IVC filter is in place. There is no bowel dilatation or ascites   or adenopathy or focal inflammation of the peritoneal fat. Urinary   bladder is grossly normal. Theprostate is mildly enlarged.    IMPRESSION:    Mild bibasilar pneumonia or pneumonitis, new. No acute abdominal or   pelvic findings        CT Angio Chest w/ IV Cont (09.30.18 @ 00:25) >    FINDINGS:    Chest:  Vascular: Evaluation of the subsegmental pulmonary arteries are limited   secondary to motion artifact. There is no evidence of a pulmonary   embolism in the main, lobar or segmental pulmonary arteries.    Lungs: No pulmonary nodules, masses or consolidations are seen. There is   no evidence of a pleural effusion. Subsegmental atelectasis is seen in   the lung bases.    Airways: The trachea and main bronchi are patent.    Mediastinum: There are no significant mediastinal, hilar or axillary   adenopathy. The heart size is within normal limits. There is no   pericardial effusion.    Miscellaneous: The visualized thyroid is unremarkable. Prominent breast   tissue is seen bilaterally compatible with gynecomastia.    Bones: Degenerative changes of the spine are seen. Multiple old   right-sided rib fracture deformities are noted.    Abdomen:  Organs: Evaluation is limited secondary to streak artifact. The patient   is status post a cholecystectomy.Bilateral renal cysts are noted. The   liver, spleen, pancreas and adrenal glands are grossly unremarkable.    Gastrointestinal tract: Evaluation is limited secondary to streak   artifact. There is no evidence of a bowel obstruction or inflammation.   The appendix is unremarkable.    Vascular: There is no abdominal aortic aneurysm. An infrarenal IVC filter   is noted.    Pelvis: The prostate is enlarged measuring up to 5.4 cm. Urinary bladder   and seminal vesicles are unremarkable.    Miscellaneous: There is no significant abdominal or pelvic   lymphadenopathy. No free air or free fluid is demonstrated.    Bones: Degenerative changes of the spine are seen.    IMPRESSION:  1. Evaluation of the subsegmental pulmonary arteries are limited   secondary to motion artifact. No evidence of a pulmonary embolism in the   main, lobar or segmental pulmonary arteries.  2. No bowel obstruction or inflammation.      Assessment :             Plan :       Continue with present regime .  Approptiate use of antibiotics and adverse effects reviewed.      I have discussed the above plan of care with patient/family in detail. They expressed understanding of the treatment plan . Risks, benefits and alternatives discussed in detail. I have asked if they have any questions or concerns and appropriately addressed them to the best of my ability .      > 45 minutes spent in direct patient care reviewing  the notes, lab data/ imaging , discussion with multidisciplinary team. All questions were addressed and answered to the best of my capacity .    Thank you for allowing me to participate in the care of your patient .      Derrek Corbin MD  [phone] Infectious Diseases Consult by Derrek Corbin MD    Reason for Consult :    HPI:   Patient is poor historian., All hx per his sister Mona Slater ( HCP )  who is at bedside   73 year old male hx of morbid obesity, bipolar disorder a LTC resident of [facility], bed bound for over 2 years sent in to ER with complaints of increasing SOB, abdominal pains and diarrhea and increased lethargy . He was seen with same complaints at [facility] on 9/30/18 , had CTA chest and abdomen and was dc back after negative results . He stopped eating and drinking for past few weeks as he was trying to loose weight .    In Er he was obtunded  he was seen by Neurology, cardiology and Pulmonary, was placed on C diff precautions as he was being worked up for diarrhea for past 2 weeks . Admitted for further work up as possible pneumonia . he has multiple DU and superficial wounds from scratch marks     No urine cs collected in ER     He is getting blood transfusion for anemia       Past Medical & Surgical Hx:  PAST MEDICAL & SURGICAL HISTORY:  Myocardial infarct, old  Gastroesophageal reflux disease, esophagitis presence not specified  Other pulmonary embolism with acute cor pulmonale, unspecified chronicity  Acute congestive heart failure, unspecified heart failure type  Cellulitis, unspecified cellulitis site: abdomen buttocks  Candida infection  Acute congestive heart failure, unspecified heart failure type  Rheumatoid arthritis with positive rheumatoid factor, involving unspecified site  Primary osteoarthritis, unspecified site  Chronic atrial fibrillation  Bipolar 1 disorder  Angina at rest  COPD mixed type  Essential hypertension  Morbid obesity      Social History-- Disabled, LTC resident    EtOH: denies   Tobacco: denies   Drug Use: denies       FAMILY HISTORY:  No pertinent family history in first degree relatives      Allergies    penicillin (Unknown)  strawberry (Unknown)    Intolerances        Home/ Out patient  Medications :  Home Medications:  acetaminophen 500 mg oral tablet: 2 tab(s) orally every 8 hours (29 Sep 2018 22:53)  Acidophilus: 100 milligram(s) orally 2 times a day (29 Sep 2018 22:53)  Coumadin: 1.5 milligram(s) orally once a day (at bedtime) (29 Sep 2018 22:53)  Deep Sea Nasal Spray 0.65% nasal spray: 2 spray(s) nasal 2 times a day (29 Sep 2018 22:53)  gabapentin 600 mg oral tablet: 1 tab(s) orally 3 times a day (29 Sep 2018 22:53)  hydrALAZINE 50 mg oral tablet: orally every 8 hours (29 Sep 2018 22:53)  Imodium Multi-Symptom Relief 2 mg-125 mg oral tablet: orally 4 times a day, As Needed (29 Sep 2018 22:53)  ipratropium-albuterol 0.5 mg-2.5 mg/3 mLinhalation solution: inhaled every 6 hours (29 Sep 2018 22:53)  loperamide 2 mg oral capsule: orally every other day (29 Sep 2018 22:53)  methocarbamol 500 mg oral tablet: orally 2 times a day (29 Sep 2018 22:53)  Metoprolol Succinate ER: 400 milligram(s) orally once a day (29 Sep 2018 22:53)  Mucinex 600 mg oral tablet, extended release: 1 tab(s) orally every 12 hours (29 Sep 2018 22:53)  potassium chloride 10 mEq oral capsule, extended release: 4 cap(s) orally 2 times a day (29 Sep 2018 22:53)  QUEtiapine 300 mg oral tablet: orally once a day (at bedtime) (29 Sep 2018 22:53)  Robitussin Cold and Cough oral liquid: 5 milliliter(s) orally every 6 hours (29 Sep 2018 22:53)  traMADol 50 mg oral tablet: orally every 6 hours, As Needed (29 Sep 2018 22:53)  venlafaxine 150 mg oral capsule, extended release: 1 cap(s) orally once a day (29 Sep 2018 22:53)  Zithromax 250 mg oral tablet: orally once a day (29 Sep 2018 22:53)      Current Inpatient Medications :    ANTIBIOTICS:   aztreonam  IVPB 1000 milliGRAM(s) IV Intermittent every 6 hours  metroNIDAZOLE  IVPB      metroNIDAZOLE  IVPB 500 milliGRAM(s) IV Intermittent every 8 hours      OTHER RELEVANT MEDICATIONS :  acetaminophen  Suppository .. 650 milliGRAM(s) Rectal four times a day PRN  ALBUTerol    0.083% 2.5 milliGRAM(s) Nebulizer every 6 hours  guaiFENesin  milliGRAM(s) Oral every 12 hours  methocarbamol 500 milliGRAM(s) Oral two times a day  ondansetron Injectable 4 milliGRAM(s) IV Push every 6 hours PRN  promethazine Suppository 12.5 milliGRAM(s) Rectal four times a day PRN  sodium chloride 0.65% Nasal 1 Spray(s) Both Nostrils four times a day PRN  tiotropium 18 MICROgram(s) Capsule 1 Capsule(s) Inhalation daily  venlafaxine XR. 150 milliGRAM(s) Oral daily      ROS:  Unable to obtain due to : as he was obtunded       I&O's Detail    06 Oct 2018 07:01  -  06 Oct 2018 11:58  --------------------------------------------------------  IN:    IV PiggyBack: 100 mL  Total IN: 100 mL    OUT:  Total OUT: 0 mL    Total NET: 100 mL          Physical Exam:  Vital Signs Last 24 Hrs  T(C): 37 (06 Oct 2018 11:20), Max: 37 (06 Oct 2018 11:20)  T(F): 98.6 (06 Oct 2018 11:20), Max: 98.6 (06 Oct 2018 11:20)  HR: 77 (06 Oct 2018 11:20) (77 - 112)  BP: 92/62 (06 Oct 2018 11:20) (90/60 - 133/81)  BP(mean): --  RR: 18 (06 Oct 2018 11:20) (17 - 20)  SpO2: 98% (06 Oct 2018 11:20) (93% - 99%)  Height (cm): 172.72 (10-05 @ 12:04)  Weight (kg): 181 (10-05 @ 12:04)  BMI (kg/m2): 60.7 (10-05 @ 12:04)  BSA (m2): 2.74 (10-05 @ 12:04)    General: obtunded , responds to deep pain ful stimuli , on VM . Morbidly obese   Eyes: sclera anicteric, pupils equal and reactive to light, conjunctival pallor   ENMT: buccal mucosa moist, pharynx not injected  Neck: supple, trachea midline  Lungs: clear, no wheeze/rhonchi  Cardiovascular: irregular rate and rhythm, S1 S2  Abdomen: soft, nontender, obese, large pannus , superficial  wounds on abdominal wall,  from no organomegaly present, bowel sounds normal  Neurological:  alert and oriented x3, Cranial Nerves II-XII grossly intact  Skin: no increased ecchymosis/petechiae/purpura has unstageable buttock DU 10 cm x 18 cm   Lymph Nodes: no palpable cervical/supraclavicular lymph nodes enlargements,   Extremities: no cyanosis/clubbing, has 2 + leg edema with  chronic venous stasis dermatitis       Labs:                          7.5    x     )----------( x        ( 06 Oct 2018 05:32 )           23.8     Complete Blood Count (10.06.18 @ 00:22)    Nucleated RBC: 0 /100 WBCs    WBC Count: 8.62 K/uL    RBC Count: 3.30 M/uL    Hemoglobin: 9.3 g/dL    Hematocrit: 29.4 %    Mean Cell Volume: 89.1 fl    Mean Cell Hemoglobin: 28.2 pg    Mean Cell Hemoglobin Conc: 31.6 gm/dL    Red Cell Distrib Width: 21.3 %    Platelet Count - Automated: 182 K/uL        144    |  115    |  35     ----------------------------<  140        ( 06 Oct 2018 05:32 )  4.1     |  17     |  1.40     Ca    7.0        ( 06 Oct 2018 05:32 )  Mg     2.1       ( 05 Oct 2018 20:05 )        PT/INR -  25.3 sec / 2.28 ratio   ( 06 Oct 2018 05:32 )       PTT -  31.9 sec   ( 06 Oct 2018 05:32 )    Lactate, Blood: 1.6 mmol/L (10-06-18 @ 05:32)  Lactate, Blood: 2.3 mmol/L (10-05-18 @ 17:14)      Procalcitonin, Serum (10.05.18 @ 20:05)    Procalcitonin, Serum: 0.81:     Serum Pro-Brain Natriuretic Peptide (10.05.18 @ 20:27)    Serum Pro-Brain Natriuretic Peptide: 34027 pg/mL        ABG - ( 06 Oct 2018 02:28 )  pH, Arterial: 7.38  pH, Blood: x     /  pCO2: 28    /  pO2: 77    / HCO3: 18    / Base Excess: -7.8  /  SaO2: 94          RECENT CULTURES:      RADIOLOGY & ADDITIONAL STUDIES:    Xray Chest 1 View- PORTABLE-Routine (10.06.18 @ 10:18) >  Shallow inspiration, elevated diaphragm .  There are prominent   bronchovascular markings in the perihilar regions, this could be related   to vascular congestion although findings may be accentuated due to the   patient's body habitus. Heart size within normal limits. No acute osseous   abnormalities.    Recommend a follow-up exam with deeper inspiration if clinically   applicable.    IMPRESSION: See above report      CT Head No Cont (10.06.18 @ 04:40) >  FINDINGS:    There is volume loss accounting for prominent ventricles. There is   atherosclerosis. White matter hypodensities noted compatible with   moderate chronic microvascular ischemic change in this age group. There   is no midline shift, mass effect, extra axial collection, or intracranial   hemorrhage.    The calvarium is intact. The visualized paranasal sinuses are aerated.   The mastoid air cells are clear.    IMPRESSION:    No acute hemorrhage or mass effect. Chronic changes noted      CT Abdomen and Pelvis w/ Oral Cont (10.05.18 @ 15:22) >  Image quality limited by body habitus. Portions of the panniculus are   excluded from the field-of-view.    There is interval development of mild fluffy airspace infiltrate in both   lung bases without segmental consolidation, cavitation or effusion.    The gallbladder is surgically absent. The pancreas is fatty replaced.   Spleen is enlarged, measuring 14.7 cm in AP dimension. The liver and   adrenals and appendix are grossly normal given limitations of a   noncontrast exam. There are low-attenuation lesions in both kidneys   consistent with small cysts. There are small nonobstructive stones on the   right. An IVC filter is in place. There is no bowel dilatation or ascites   or adenopathy or focal inflammation of the peritoneal fat. Urinary   bladder is grossly normal. Theprostate is mildly enlarged.    IMPRESSION:    Mild bibasilar pneumonia or pneumonitis, new. No acute abdominal or   pelvic findings        CT Angio Chest w/ IV Cont (09.30.18 @ 00:25) >    FINDINGS:    Chest:  Vascular: Evaluation of the subsegmental pulmonary arteries are limited   secondary to motion artifact. There is no evidence of a pulmonary   embolism in the main, lobar or segmental pulmonary arteries.    Lungs: No pulmonary nodules, masses or consolidations are seen. There is   no evidence of a pleural effusion. Subsegmental atelectasis is seen in   the lung bases.    Airways: The trachea and main bronchi are patent.    Mediastinum: There are no significant mediastinal, hilar or axillary   adenopathy. The heart size is within normal limits. There is no   pericardial effusion.    Miscellaneous: The visualized thyroid is unremarkable. Prominent breast   tissue is seen bilaterally compatible with gynecomastia.    Bones: Degenerative changes of the spine are seen. Multiple old   right-sided rib fracture deformities are noted.    Abdomen:  Organs: Evaluation is limited secondary to streak artifact. The patient   is status post a cholecystectomy.Bilateral renal cysts are noted. The   liver, spleen, pancreas and adrenal glands are grossly unremarkable.    Gastrointestinal tract: Evaluation is limited secondary to streak   artifact. There is no evidence of a bowel obstruction or inflammation.   The appendix is unremarkable.    Vascular: There is no abdominal aortic aneurysm. An infrarenal IVC filter   is noted.    Pelvis: The prostate is enlarged measuring up to 5.4 cm. Urinary bladder   and seminal vesicles are unremarkable.    Miscellaneous: There is no significant abdominal or pelvic   lymphadenopathy. No free air or free fluid is demonstrated.    Bones: Degenerative changes of the spine are seen.    IMPRESSION:  1. Evaluation of the subsegmental pulmonary arteries are limited   secondary to motion artifact. No evidence of a pulmonary embolism in the   main, lobar or segmental pulmonary arteries.  2. No bowel obstruction or inflammation.      Assessment :     73 year old male hx of morbid obesity, bipolar disorder a LTC resident of [facility], bed bound for over 2 years sent in to ER with complaints of increasing SOB, abdominal pains and diarrhea and increased lethargy , noted to be obtunded and poorly responsive, ABG does not show hypercapnia and ct head was negative . he is afebrile and has normal WBC so doubt its all from infection. he most likely has metabolic encephalopathy. he could have a CNS event although ct head negative . He has elevated P BNP but no evidence of CHF as per cardiology     He has multiple open wounds and bilateral unstageable DU on both buttocks , so that could be a source of infection. NO UA or urine cs was sent     Plan :   - change Azactam to 2 grams q 8 , continue with IV Flagyl for possible aspiration Pneumonia . Add Vancomycin for staph coverage   - screen for MRSA   - trend labs   - check ammonia level, sed rate, CRP   - obtain labs from [facility]    Continue with present regime .  Appropriate use of antibiotics and adverse effects reviewed.      I have discussed the above plan of care with patient's sister Natasha and her   who is the HCP  in detail. They expressed understanding of the treatment plan . Risks, benefits and alternatives discussed in detail. I have asked if they have any questions or concerns and appropriately addressed them to the best of my ability . He is DNR /DNI as per his sister . The sister is going to [facility] to get records       > 75 minutes spent in direct patient care reviewing  the notes, lab data/ imaging , discussion with multidisciplinary team. All questions were addressed and answered to the best of my capacity .    Thank you for allowing me to participate in the care of your patient .      Derrek Corbin MD  [phone]

## 2018-10-06 NOTE — CHART NOTE - NSCHARTNOTEFT_GEN_A_CORE
Rapid response was called at on this 73y Male patient for worsening mental status    Patient was examined at the bedside at the time of follow up, without significant change in mental status from RRT. Patient found to be difficult to rouse despite multiple sternal rubs. Unable to perform neurological assessment. CT Head performed, negative. Patient unable to provide responses, A&Ox0. Currently, no fever, n/v, chest pain.     Vital Signs Last 24 Hrs  T(C): 36.7 (06 Oct 2018 01:22), Max: 36.7 (05 Oct 2018 19:56)  T(F): 98 (06 Oct 2018 01:22), Max: 98.1 (05 Oct 2018 19:56)  HR: 97 (06 Oct 2018 01:22) (83 - 112)  BP: 133/81 (05 Oct 2018 22:37) (90/60 - 133/81)  RR: 19 (06 Oct 2018 01:22) (18 - 20)  SpO2: 99% (06 Oct 2018 01:22) (93% - 99%) on Ventimask @ 40%      Physical exam  General: Drowsy, unable to arouse  HEENT: NCAT, PERRLA, dry mucous membranes   Neurology: A&Ox0, nonfocal  Respiratory: CTA B/L  CV: RRR, +S1/S2, no murmurs, rubs or gallops  Abdominal: Soft, NT, ND +BSx4, obese   Extremities: B/L LE edema, poor capillary refill, + peripheral pulses  Skin: warm, dry, areas of ecchymosis b/l upper and lower extremities                          7.5    x     )-----------( x        ( 06 Oct 2018 05:32 )             23.8     06 Oct 2018 05:32    144    |  115    |  35     ----------------------------<  140    4.1     |  17     |  1.40     Ca    7.0        06 Oct 2018 05:32  Mg     2.1       05 Oct 2018 20:05    TPro  6.9    /  Alb  2.7    /  TBili  0.9    /  DBili  x      /  AST  53     /  ALT  27     /  AlkPhos  99     05 Oct 2018 12:53    LIVER FUNCTIONS - ( 05 Oct 2018 12:53 )  Alb: 2.7 g/dL / Pro: 6.9 g/dL / ALK PHOS: 99 U/L / ALT: 27 U/L / AST: 53 U/L / GGT: x           PT/INR - ( 06 Oct 2018 05:32 )   PT: 25.3 sec;   INR: 2.28 ratio         PTT - ( 06 Oct 2018 05:32 )  PTT:31.9 sec  CAPILLARY BLOOD GLUCOSE      POCT Blood Glucose.: 157 mg/dL (06 Oct 2018 04:03)  POCT Blood Glucose.: 168 mg/dL (05 Oct 2018 23:35)      A/P  Rapid response was called at on this 73y Male patient for worsening change in mental status  Continued AMS from baseline still unknown, etiology remains unclear  -BP responding minimally to 2 1L Bolus of NS  -Morning labs significant for drop in H/H, hemoglobin 7.5, component dilutional, will order 1 unit pRBCs, repeat H/H ordered 11:00  - continue on ventimask at 40% fio2  - Stat CT head performed, negative  - Call placed to Dr. Tuttle, discussed case, no additional recs  -ICU consult call noted, no further recs per ICU    -

## 2018-10-06 NOTE — PROGRESS NOTE ADULT - SUBJECTIVE AND OBJECTIVE BOX
Patient is a 73y old  Male who presents with a chief complaint of not feeling well (06 Oct 2018 15:02)      INTERVAL /OVERNIGHT EVENTS: now lethargic, moaning and groaning    MEDICATIONS  (STANDING):  ALBUTerol    0.083% 2.5 milliGRAM(s) Nebulizer every 6 hours  aztreonam  IVPB 2000 milliGRAM(s) IV Intermittent every 8 hours  guaiFENesin  milliGRAM(s) Oral every 12 hours  lactobacillus acidophilus 1 Tablet(s) Oral three times a day with meals  methocarbamol 500 milliGRAM(s) Oral two times a day  metroNIDAZOLE  IVPB      metroNIDAZOLE  IVPB 500 milliGRAM(s) IV Intermittent every 8 hours  nystatin Powder 1 Application(s) Topical two times a day  tiotropium 18 MICROgram(s) Capsule 1 Capsule(s) Inhalation daily  vancomycin  IVPB      venlafaxine XR. 150 milliGRAM(s) Oral daily    MEDICATIONS  (PRN):  acetaminophen  Suppository .. 650 milliGRAM(s) Rectal four times a day PRN Temp greater or equal to 38C (100.4F)  ondansetron Injectable 4 milliGRAM(s) IV Push every 6 hours PRN Nausea  promethazine Suppository 12.5 milliGRAM(s) Rectal four times a day PRN breakthrough nausea / vomiting  sodium chloride 0.65% Nasal 1 Spray(s) Both Nostrils four times a day PRN Nasal Congestion      Allergies    penicillin (Unknown)  strawberry (Unknown)    Intolerances        REVIEW OF SYSTEMS: unable to obtain    Vital Signs Last 24 Hrs  T(C): 36.6 (06 Oct 2018 12:07), Max: 37 (06 Oct 2018 11:20)  T(F): 97.9 (06 Oct 2018 12:07), Max: 98.6 (06 Oct 2018 11:20)  HR: 93 (06 Oct 2018 14:30) (77 - 112)  BP: 128/90 (06 Oct 2018 12:07) (92/62 - 133/81)  BP(mean): --  RR: 19 (06 Oct 2018 12:07) (17 - 20)  SpO2: 99% (06 Oct 2018 14:30) (93% - 99%)    PHYSICAL EXAM:  GENERAL: NAD, well-groomed, well-developed  HEAD:  Atraumatic, Normocephalic  EYES: EOMI, PERRLA, conjunctiva and sclera clear  ENMT: No tonsillar erythema, exudates, or enlargement; Moist mucous membranes, Good dentition, No lesions  NECK: Supple, No JVD, Normal thyroid  NERVOUS SYSTEM:  arousable  CHEST/LUNG: Clear to auscultation bilaterally; No rales, rhonchi, wheezing, or rubs  HEART: Regular rate and rhythm; No murmurs, rubs, or gallops  ABDOMEN: Soft, Nontender, Nondistended; Bowel sounds present  EXTREMITIES:  2+ Peripheral Pulses, No clubbing, cyanosis, + edema  LYMPH: No lymphadenopathy noted  SKIN: No rashes or lesions    LABS:                        7.5    x     )-----------( x        ( 06 Oct 2018 05:32 )             23.8     06 Oct 2018 05:32    144    |  115    |  35     ----------------------------<  140    4.1     |  17     |  1.40     Ca    7.0        06 Oct 2018 05:32  Mg     2.1       05 Oct 2018 20:05      PT/INR - ( 06 Oct 2018 05:32 )   PT: 25.3 sec;   INR: 2.28 ratio         PTT - ( 06 Oct 2018 05:32 )  PTT:31.9 sec  Urinalysis Basic - ( 06 Oct 2018 14:53 )    Color: Yellow / Appearance: Clear / S.015 / pH: x  Gluc: x / Ketone: Small  / Bili: Small / Urobili: Negative   Blood: x / Protein: 25 mg/dL / Nitrite: Negative   Leuk Esterase: Trace / RBC: 6-10 /HPF / WBC 0-2   Sq Epi: x / Non Sq Epi: Occasional / Bacteria: Occasional      CAPILLARY BLOOD GLUCOSE      POCT Blood Glucose.: 157 mg/dL (06 Oct 2018 04:03)  POCT Blood Glucose.: 168 mg/dL (05 Oct 2018 23:35)      RADIOLOGY & ADDITIONAL TESTS:    Notes Reviewed:  [x ] YES  [ ] NO    Care Discussed with Consultants/Other Providers [x ] YES  [ ] NO

## 2018-10-06 NOTE — DIETITIAN INITIAL EVALUATION ADULT. - ETIOLOGY
likely due to previous failed attempts at dieting ( although fluid retention a contributing factor to some of the excess wt at this time)

## 2018-10-06 NOTE — PROVIDER CONTACT NOTE (OTHER) - ACTION/TREATMENT ORDERED:
Pt is acutely confused in and out on consciousness Pt is acutely confused in and out on consciousness. 1 liter bolus ordered being given needed a new IV pt is dehydrated. CT scan ordered after bolus and bp to be retaken after bolus. ABG ordered taken.

## 2018-10-06 NOTE — PROGRESS NOTE ADULT - SUBJECTIVE AND OBJECTIVE BOX
Date/Time Patient Seen:  		  Referring MD:   Data Reviewed	       Patient is a 73y old  Male who presents with a chief complaint of not feeling well (05 Oct 2018 19:08)  in bed  seen and examined  vs and meds reviewed      Subjective/HPI     PAST MEDICAL & SURGICAL HISTORY:  Myocardial infarct, old  Gastroesophageal reflux disease, esophagitis presence not specified  Other pulmonary embolism with acute cor pulmonale, unspecified chronicity  Acute congestive heart failure, unspecified heart failure type  Cellulitis, unspecified cellulitis site: abdomen buttocks  Candida infection  Acute congestive heart failure, unspecified heart failure type  Rheumatoid arthritis with positive rheumatoid factor, involving unspecified site  Primary osteoarthritis, unspecified site  Chronic atrial fibrillation  Bipolar 1 disorder  Angina at rest  COPD mixed type  Essential hypertension  Morbid obesity        Medication list         MEDICATIONS  (STANDING):  ALBUTerol    0.083% 2.5 milliGRAM(s) Nebulizer every 6 hours  aztreonam  IVPB 1000 milliGRAM(s) IV Intermittent every 6 hours  gabapentin 600 milliGRAM(s) Oral three times a day  guaiFENesin  milliGRAM(s) Oral every 12 hours  lactated ringers. 1000 milliLiter(s) (100 mL/Hr) IV Continuous <Continuous>  lactobacillus acidophilus 1 Tablet(s) Oral three times a day with meals  loperamide 2 milliGRAM(s) Oral daily  methocarbamol 500 milliGRAM(s) Oral two times a day  potassium chloride    Tablet ER 10 milliEquivalent(s) Oral daily  QUEtiapine 300 milliGRAM(s) Oral at bedtime  tiotropium 18 MICROgram(s) Capsule 1 Capsule(s) Inhalation daily  venlafaxine XR. 150 milliGRAM(s) Oral daily  warfarin 1.5 milliGRAM(s) Oral daily    MEDICATIONS  (PRN):  acetaminophen   Tablet .. 650 milliGRAM(s) Oral every 6 hours PRN Temp greater or equal to 38C (100.4F)  ondansetron Injectable 4 milliGRAM(s) IV Push every 6 hours PRN Nausea  sodium chloride 0.65% Nasal 1 Spray(s) Both Nostrils four times a day PRN Nasal Congestion  traMADol 25 milliGRAM(s) Oral every 6 hours PRN Mild Pain (1 - 3)         Vitals log        ICU Vital Signs Last 24 Hrs  T(C): 36.7 (06 Oct 2018 01:22), Max: 36.7 (05 Oct 2018 19:56)  T(F): 98 (06 Oct 2018 01:22), Max: 98.1 (05 Oct 2018 19:56)  HR: 97 (06 Oct 2018 01:22) (83 - 112)  BP: 133/81 (05 Oct 2018 22:37) (90/60 - 133/81)  BP(mean): --  ABP: --  ABP(mean): --  RR: 19 (06 Oct 2018 01:22) (18 - 20)  SpO2: 99% (06 Oct 2018 01:22) (93% - 99%)           Input and Output:  I&O's Detail      Lab Data                        7.5    x     )-----------( x        ( 06 Oct 2018 05:32 )             23.8     10-06    144  |  115<H>  |  35<H>  ----------------------------<  140<H>  4.1   |  17<L>  |  1.40<H>    Ca    7.0<L>      06 Oct 2018 05:32  Mg     2.1     10-05    TPro  6.9  /  Alb  2.7<L>  /  TBili  0.9  /  DBili  x   /  AST  53<H>  /  ALT  27  /  AlkPhos  99  10-05    ABG - ( 06 Oct 2018 02:28 )  pH, Arterial: 7.38  pH, Blood: x     /  pCO2: 28    /  pO2: 77    / HCO3: 18    / Base Excess: -7.8  /  SaO2: 94                      Review of Systems	      Objective     Physical Examination    heart s1s2  lung dec BS  obese  extr chr changes  lethargic      Pertinent Lab findings & Imaging      Harjinder:  NO   Adequate UO     I&O's Detail           Discussed with:     Cultures:	        Radiology

## 2018-10-06 NOTE — CHART NOTE - NSCHARTNOTEFT_GEN_A_CORE
Rapid response was called at on this 73y Male patient for change in mental status and SOB.    Patient was seen and examined at the bedside by the rapid response team and primary team. ICU PA at bedside. Patient was stable at the end of the RRT, O2 sat had improved.    Patient was examined at the bedside at the time of follow up, again having a change in mental status and SOB. Patient's NC at 7L was changed to ventimask at 35% O2. Patient unable to respond appropriately when questioned, only making unintelligible noises, and found to have hypotension 80/40 manually auscultated. Sister and brother-in-law (RYAN) of patient at bedside at the time of the followup, able to elicit additional information from that found from admission. Patient arrived from SNF, as per family has not been eating or drinking for the past 2 weeks, and refusing all medications, including psychiatric medications. Patient unable to provide responses, but appears agitated and combative as compared to at the end of RRT. Currently, no fever, n/v, chest pain.     RRT F/u Vital Signs  BP: 84/40 manually auscultated right arm  T: 98.0F  RR: 19  HR: 109 on telemetry monitor  SpO2: 96% on ventimask, 35% FiO2    Physical Exam:  General: Drowsy, lethargic, NAD  HEENT: NCAT, PERRLA, bl, moist mucous membranes   Neurology: A&Ox0, nonfocal  Respiratory: CTA B/L  CV: RRR, +S1/S2, no murmurs, rubs or gallops  Abdominal: Soft, NT, ND +BSx4, obese   Extremities: B/L LE edema, poor capillary refill, + peripheral pulses  Skin: warm, dry, areas of ecchymosis b/l upper and lower extremities                          9.3    8.62  )-----------( 182      ( 06 Oct 2018 00:22 )             29.4     05 Oct 2018 12:53    140    |  111    |  38     ----------------------------<  151    4.7     |  18     |  1.60     Ca    7.9        05 Oct 2018 12:53  Mg     2.1       05 Oct 2018 20:05    TPro  6.9    /  Alb  2.7    /  TBili  0.9    /  DBili  x      /  AST  53     /  ALT  27     /  AlkPhos  99     05 Oct 2018 12:53    LIVER FUNCTIONS - ( 05 Oct 2018 12:53 )  Alb: 2.7 g/dL / Pro: 6.9 g/dL / ALK PHOS: 99 U/L / ALT: 27 U/L / AST: 53 U/L / GGT: x           PT/INR - ( 05 Oct 2018 20:05 )   PT: 23.5 sec;   INR: 2.12 ratio         PTT - ( 05 Oct 2018 20:05 )  PTT:33.5 sec  CAPILLARY BLOOD GLUCOSE      POCT Blood Glucose.: 168 mg/dL (05 Oct 2018 23:35)    A/P    73y Male patient for lethargy, who is admitted for SOB and diarrhea (r/o c diff), RRT called for AMS/Lethargy. At follow up, patient found to have significant clinical decline    -Continues to have change in baseline mental status, confirmed by patient's family at bedside. Noted to have very minimal PO intake, and refused medications for the past 2 weeks per family. Full PMHx remains unknown at this time  -Continued difficulty with respiration, NC changed to ventimask at 35% FiO2, with some clinincal improvement, SpO2 high 90s  -ABG refused by patient at the time of rapid, able to obtain on follow up - within normal limits, patient not retaining CO2  -BP manually auscultated to be 84/40, patient appears dry - will bolus with 1 L NS, follow up VS  -CT Head to evaluate acute change in mental status  -VS and Labs reviewed, no additional labs required at the present time.   -Dr. Tuttle's service contacted, awaiting callback Rapid response was called at on this 73y Male patient for change in mental status and SOB.    Patient was examined at the bedside at the time of follow up, again having a change in mental status and SOB. Patient's NC at 7L was changed to ventimask at 40% O2. Patient unable to respond appropriately when questioned, only making unintelligible noises, and found to have hypotension 80/40 manually auscultated. Sister and brother-in-law (RYAN) of patient at bedside at the time of the followup, able to elicit additional information from that found from admission. Patient arrived from SNF, as per family has not been eating or drinking for the past 2 weeks, and refusing all medications, including psychiatric medications. Patient unable to provide responses, but appears agitated and combative as compared to at the end of RRT. Currently, no fever, n/v, chest pain.     RRT F/u Vital Signs  BP: 84/40 manually auscultated right arm  T: 98.0F  RR: 19  HR: 109 on telemetry monitor  SpO2: 96% on ventimask, 40% FiO2    Physical Exam:  General: Drowsy, lethargic, NAD  HEENT: NCAT, PERRLA, bl, moist mucous membranes   Neurology: A&Ox0, nonfocal  Respiratory: CTA B/L  CV: RRR, +S1/S2, no murmurs, rubs or gallops  Abdominal: Soft, NT, ND +BSx4, obese   Extremities: B/L LE edema, poor capillary refill, + peripheral pulses  Skin: warm, dry, areas of ecchymosis b/l upper and lower extremities                          9.3    8.62  )-----------( 182      ( 06 Oct 2018 00:22 )             29.4     05 Oct 2018 12:53    140    |  111    |  38     ----------------------------<  151    4.7     |  18     |  1.60     Ca    7.9        05 Oct 2018 12:53  Mg     2.1       05 Oct 2018 20:05    TPro  6.9    /  Alb  2.7    /  TBili  0.9    /  DBili  x      /  AST  53     /  ALT  27     /  AlkPhos  99     05 Oct 2018 12:53    LIVER FUNCTIONS - ( 05 Oct 2018 12:53 )  Alb: 2.7 g/dL / Pro: 6.9 g/dL / ALK PHOS: 99 U/L / ALT: 27 U/L / AST: 53 U/L / GGT: x           PT/INR - ( 05 Oct 2018 20:05 )   PT: 23.5 sec;   INR: 2.12 ratio         PTT - ( 05 Oct 2018 20:05 )  PTT:33.5 sec  CAPILLARY BLOOD GLUCOSE      POCT Blood Glucose.: 168 mg/dL (05 Oct 2018 23:35)    A/P    73y Male patient for lethargy, who is admitted for SOB and diarrhea (r/o c diff), RRT called for AMS/Lethargy. At follow up, patient found to have significant clinical decline    -Continues to have change in baseline mental status, confirmed by patient's family at bedside. Noted to have very minimal PO intake, and refused medications for the past 2 weeks per family. Full PMHx remains unknown at this time  -Continued difficulty with respiration, NC changed to ventimask at 40% FiO2, with some clinincal improvement, SpO2 high 90s  -ABG refused by patient at the time of rapid, able to obtain on follow up - within normal limits, patient not retaining CO2  -BP manually auscultated to be 84/40, patient appears dry - will bolus with 1 L NS, follow up VS  -CT Head to evaluate acute change in mental status  -VS and Labs reviewed, no additional labs required at the present time.   -Dr. Tuttle's service contacted, awaiting callback

## 2018-10-06 NOTE — CHART NOTE - NSCHARTNOTEFT_GEN_A_CORE
ICU Progress Note    HPI:    S:    Pt seen and examined  HD # 2  PMHx bipolar, COPD, CHF, DM, RA, morbid obesity, HTN, VTE on coumadin  Lives in NH  DNR/DNI per NH records; family came in and signed paperwork in chart    Pt here for AMS  Per chart and family, Pt has had poor PO intake ~ 2 weeks, diarrhea several days    RRT called several times throughout evening for unresponsiveness  After noxious stimuli, Pt awoke every time  Transient hypotension resolved with IVF    w/u thus far shows possible PNA on lower bases CT A/P  DIOR, mild lactic acidosis (2.3)      Allergies    penicillin (Unknown)  strawberry (Unknown)    Intolerances        MEDICATIONS  (STANDING):  ALBUTerol    0.083% 2.5 milliGRAM(s) Nebulizer every 6 hours  aztreonam  IVPB 1000 milliGRAM(s) IV Intermittent every 6 hours  gabapentin 600 milliGRAM(s) Oral three times a day  guaiFENesin  milliGRAM(s) Oral every 12 hours  lactated ringers. 1000 milliLiter(s) (100 mL/Hr) IV Continuous <Continuous>  lactobacillus acidophilus 1 Tablet(s) Oral three times a day with meals  loperamide 2 milliGRAM(s) Oral daily  methocarbamol 500 milliGRAM(s) Oral two times a day  potassium chloride    Tablet ER 10 milliEquivalent(s) Oral daily  QUEtiapine 300 milliGRAM(s) Oral at bedtime  tiotropium 18 MICROgram(s) Capsule 1 Capsule(s) Inhalation daily  venlafaxine XR. 150 milliGRAM(s) Oral daily  warfarin 1.5 milliGRAM(s) Oral daily    MEDICATIONS  (PRN):  acetaminophen   Tablet .. 650 milliGRAM(s) Oral every 6 hours PRN Temp greater or equal to 38C (100.4F)  ondansetron Injectable 4 milliGRAM(s) IV Push every 6 hours PRN Nausea  sodium chloride 0.65% Nasal 1 Spray(s) Both Nostrils four times a day PRN Nasal Congestion  traMADol 25 milliGRAM(s) Oral every 6 hours PRN Mild Pain (1 - 3)      Drug Dosing Weight  Height (cm): 172.72 (05 Oct 2018 12:04)  Weight (kg): 181 (05 Oct 2018 12:04)  BMI (kg/m2): 60.7 (05 Oct 2018 12:04)  BSA (m2): 2.74 (05 Oct 2018 12:04)      PAST MEDICAL & SURGICAL HISTORY:  Myocardial infarct, old  Gastroesophageal reflux disease, esophagitis presence not specified  Other pulmonary embolism with acute cor pulmonale, unspecified chronicity  Acute congestive heart failure, unspecified heart failure type  Cellulitis, unspecified cellulitis site: abdomen buttocks  Candida infection  Acute congestive heart failure, unspecified heart failure type  Rheumatoid arthritis with positive rheumatoid factor, involving unspecified site  Primary osteoarthritis, unspecified site  Chronic atrial fibrillation  Bipolar 1 disorder  Angina at rest  COPD mixed type  Essential hypertension  Morbid obesity      FAMILY HISTORY:          ROS: See HPI; otherwise, all systems reviewed and negative.    O:    ICU Vital Signs Last 24 Hrs  T(C): 36.7 (06 Oct 2018 01:22), Max: 36.7 (05 Oct 2018 19:56)  T(F): 98 (06 Oct 2018 01:22), Max: 98.1 (05 Oct 2018 19:56)  HR: 97 (06 Oct 2018 01:22) (83 - 112)  BP: 133/81 (05 Oct 2018 22:37) (90/60 - 133/81)  BP(mean): --  ABP: --  ABP(mean): --  RR: 19 (06 Oct 2018 01:22) (18 - 20)  SpO2: 99% (06 Oct 2018 01:22) (93% - 99%)      ABG - ( 06 Oct 2018 02:28 )  pH, Arterial: 7.38  pH, Blood: x     /  pCO2: 28    /  pO2: 77    / HCO3: 18    / Base Excess: -7.8  /  SaO2: 94                  I&O's Detail          PE:    Constitutional: Elderly M lying in bed. Lips, tongue appear dry.  Neck: No JVD, trachea midline.   Respiratory: CTA B/L good BS B/L no W/R/R.  Cardiovascular: S1S2+ RRR no M/R/G.  Gastrointestinal: Obese, soft, NTND.  Extremities: No peripheral edema, No cyanosis, clubbing.  Neurological: Somnolent, arousable to loud voice and physical stimuli. Oriented to person. Confused.  Skin: Multiple skin ulcerations in various stages of healing on abdomen.     LABS:    CBC Full  -  ( 06 Oct 2018 05:32 )  WBC Count : x  Hemoglobin : 7.5 g/dL  Hematocrit : 23.8 %  Platelet Count - Automated : x  Mean Cell Volume : x  Mean Cell Hemoglobin : x  Mean Cell Hemoglobin Concentration : x  Auto Neutrophil # : x  Auto Lymphocyte # : x  Auto Monocyte # : x  Auto Eosinophil # : x  Auto Basophil # : x  Auto Neutrophil % : x  Auto Lymphocyte % : x  Auto Monocyte % : x  Auto Eosinophil % : x  Auto Basophil % : x    10-05    140  |  111<H>  |  38<H>  ----------------------------<  151<H>  4.7   |  18<L>  |  1.60<H>    Ca    7.9<L>      05 Oct 2018 12:53  Mg     2.1     10-05    TPro  6.9  /  Alb  2.7<L>  /  TBili  0.9  /  DBili  x   /  AST  53<H>  /  ALT  27  /  AlkPhos  99  10-05    PT/INR - ( 05 Oct 2018 20:05 )   PT: 23.5 sec;   INR: 2.12 ratio         PTT - ( 05 Oct 2018 20:05 )  PTT:33.5 sec    CAPILLARY BLOOD GLUCOSE      POCT Blood Glucose.: 157 mg/dL (06 Oct 2018 04:03)  POCT Blood Glucose.: 168 mg/dL (05 Oct 2018 23:35)        LIVER FUNCTIONS - ( 05 Oct 2018 12:53 )  Alb: 2.7 g/dL / Pro: 6.9 g/dL / ALK PHOS: 99 U/L / ALT: 27 U/L / AST: 53 U/L / GGT: x           POCUS: Unable to visualize IVC or myocardium 2/2 body habitus. Lung US A-line only multiple views B/L.    A:    73yMale  HD # 2    Here for:    1. AMS, suspect largely 2/2  ---> maybe also septic v withdrawal from psych meds (has been refusing) causing hypoactive delirium  2. Dehydration/hypovolemia  3. DIOR, suspect pre renal 2/2 #2  4. Lactic acidosis, mild  5. PNA  6. Anemia    DNR/DNI    P/recommendations:    Avoid deleriogenic meds. Recommend holding any home meds which may be contributing to somnolence, including gabapentin, robaxin, guaifenesin, tramadol. Psych consult. HD monitoring, hold BP meds for now. TTE. IS, OOB as able. Nebs. ABG OK. Diet as able. DVT ppx. Cont abx; recommend trying to elucidate details on PCN allergy. I/O's. Cont coumadin. Pt has received 3L IVF bolus thus far; start maintenance LR @100/hr x 10 hours. f/u AM labs.     Pt is DNR/DNI. If he continues to have hypotension or worsening in condition, discussion should be had with family RE: desire for vasopressors/central line.     Cont care. Feel free to re-consult ICU if any further concerns.

## 2018-10-07 NOTE — PROGRESS NOTE ADULT - ASSESSMENT
74 y/o man w morbid obesity, bipolar disorder, decubs, resident of [facility], bedbound for 2 years, being w/u for diarrhea last few weeks, stopped eating/drinking/taking meds for 6weeks, brought in for incr lethargy/abdomen pain/diarrhea. Was seen in [facility] 9/30 for similar findings, CT c/a/p only sig for enlarged prostate.  CT head no acute findings, CT a/p w bibasilar pneumonia.  Seen by Neuro/Cardiology/ID/Pulm/Psych, started on antibiotics  CBC on 9/30 w Hgb 11.3, on 10/5 in ER 9.3, this AM 7.5  Normal MCV, WBC, platelets  eGFR ~50  INR 2+, normal [id number] on Coumadin)  Thyroid function not deficient, iron studies c/w acute illness  Stool occult blood positive  Type and cross no antibodies  Nayeli blood smear morphology no sig pathology  C dif negative  Nasal swab positive for MRSA    -anemia/precipitous drop in Hct from 11.3 on 9/30 to 9.3 on 10/5 to 7.5 on 10/6-element of GI blood loss w occult blood positive stool but no BRBPR and CT a/p no hematoma collection, no signs of hemolysis. Suspect due to acute illness, anorexia, ?sepsis, poor compensation during times of stress, with elements of baseline anemia due to chronic disease and renal insufficiency.  -post tx 1 u PRBC w appropriate increase of H/H  -follow up B12/folate  -monitor serial CBC  -?GI eval for stool positive for occult blood and epigaastric pain(outpatient Coumadin but w therapeutic INR)  -treat acute infection

## 2018-10-07 NOTE — PROGRESS NOTE ADULT - SUBJECTIVE AND OBJECTIVE BOX
Neurology follow up note    TIMOTEO PEDERSEN73yMale      Interval History:    Patient feels ok no new complaints.    MEDICATIONS    acetaminophen  Suppository .. 650 milliGRAM(s) Rectal four times a day PRN  ALBUTerol    0.083% 2.5 milliGRAM(s) Nebulizer every 6 hours  aztreonam  IVPB 2000 milliGRAM(s) IV Intermittent every 8 hours  lactobacillus acidophilus 1 Tablet(s) Oral three times a day with meals  metroNIDAZOLE  IVPB      metroNIDAZOLE  IVPB 500 milliGRAM(s) IV Intermittent every 8 hours  morphine  - Injectable 2 milliGRAM(s) IV Push every 6 hours PRN  nystatin Powder 1 Application(s) Topical two times a day  ondansetron Injectable 4 milliGRAM(s) IV Push every 6 hours PRN  promethazine Suppository 12.5 milliGRAM(s) Rectal four times a day PRN  sodium bicarbonate 650 milliGRAM(s) Oral three times a day  sodium chloride 0.65% Nasal 1 Spray(s) Both Nostrils four times a day PRN  tiotropium 18 MICROgram(s) Capsule 1 Capsule(s) Inhalation daily  vancomycin  IVPB 1500 milliGRAM(s) IV Intermittent every 12 hours  vancomycin  IVPB      venlafaxine XR. 150 milliGRAM(s) Oral daily      Allergies    penicillin (Unknown)  strawberry (Unknown)    Intolerances            Vital Signs Last 24 Hrs  T(C): 36.5 (07 Oct 2018 08:00), Max: 37.1 (06 Oct 2018 19:55)  T(F): 97.7 (07 Oct 2018 08:00), Max: 98.8 (06 Oct 2018 19:55)  HR: 90 (07 Oct 2018 08:00) (86 - 108)  BP: 111/65 (07 Oct 2018 08:00) (101/61 - 137/72)  BP(mean): --  RR: 17 (07 Oct 2018 08:00) (17 - 20)  SpO2: 95% (07 Oct 2018 08:00) (91% - 99%)                  LABS:  CBC Full  -  ( 06 Oct 2018 16:16 )  WBC Count : x  Hemoglobin : 8.3 g/dL  Hematocrit : 26.3 %  Platelet Count - Automated : x  Mean Cell Volume : x  Mean Cell Hemoglobin : x  Mean Cell Hemoglobin Concentration : x  Auto Neutrophil # : x  Auto Lymphocyte # : x  Auto Monocyte # : x  Auto Eosinophil # : x  Auto Basophil # : x  Auto Neutrophil % : x  Auto Lymphocyte % : x  Auto Monocyte % : x  Auto Eosinophil % : x  Auto Basophil % : x    Urinalysis Basic - ( 06 Oct 2018 14:53 )    Color: Yellow / Appearance: Clear / S.015 / pH: x  Gluc: x / Ketone: Small  / Bili: Small / Urobili: Negative   Blood: x / Protein: 25 mg/dL / Nitrite: Negative   Leuk Esterase: Trace / RBC: 6-10 /HPF / WBC 0-2   Sq Epi: x / Non Sq Epi: Occasional / Bacteria: Occasional      10-06    144  |  115<H>  |  35<H>  ----------------------------<  140<H>  4.1   |  17<L>  |  1.40<H>    Ca    7.0<L>      06 Oct 2018 05:32  Mg     2.1     10-05    TPro  6.9  /  Alb  2.7<L>  /  TBili  0.9  /  DBili  x   /  AST  53<H>  /  ALT  27  /  AlkPhos  99  10-05    Hemoglobin A1C:     LIVER FUNCTIONS - ( 05 Oct 2018 12:53 )  Alb: 2.7 g/dL / Pro: 6.9 g/dL / ALK PHOS: 99 U/L / ALT: 27 U/L / AST: 53 U/L / GGT: x           Vitamin B12   PT/INR - ( 06 Oct 2018 05:32 )   PT: 25.3 sec;   INR: 2.28 ratio         PTT - ( 06 Oct 2018 05:32 )  PTT:31.9 sec      RADIOLOGY Neurology follow up note    TIMOTEO [id number]      Interval History:    Patient feels ok seen with family     MEDICATIONS    acetaminophen  Suppository .. 650 milliGRAM(s) Rectal four times a day PRN  ALBUTerol    0.083% 2.5 milliGRAM(s) Nebulizer every 6 hours  aztreonam  IVPB 2000 milliGRAM(s) IV Intermittent every 8 hours  lactobacillus acidophilus 1 Tablet(s) Oral three times a day with meals  metroNIDAZOLE  IVPB      metroNIDAZOLE  IVPB 500 milliGRAM(s) IV Intermittent every 8 hours  morphine  - Injectable 2 milliGRAM(s) IV Push every 6 hours PRN  nystatin Powder 1 Application(s) Topical two times a day  ondansetron Injectable 4 milliGRAM(s) IV Push every 6 hours PRN  promethazine Suppository 12.5 milliGRAM(s) Rectal four times a day PRN  sodium bicarbonate 650 milliGRAM(s) Oral three times a day  sodium chloride 0.65% Nasal 1 Spray(s) Both Nostrils four times a day PRN  tiotropium 18 MICROgram(s) Capsule 1 Capsule(s) Inhalation daily  vancomycin  IVPB 1500 milliGRAM(s) IV Intermittent every 12 hours  vancomycin  IVPB      venlafaxine XR. 150 milliGRAM(s) Oral daily      Allergies    penicillin (Unknown)  strawberry (Unknown)    Intolerances            Vital Signs Last 24 Hrs  T(C): 36.5 (07 Oct 2018 08:00), Max: 37.1 (06 Oct 2018 19:55)  T(F): 97.7 (07 Oct 2018 08:00), Max: 98.8 (06 Oct 2018 19:55)  HR: 90 (07 Oct 2018 08:00) (86 - 108)  BP: 111/65 (07 Oct 2018 08:00) (101/61 - 137/72)  BP(mean): --  RR: 17 (07 Oct 2018 08:00) (17 - 20)  SpO2: 95% (07 Oct 2018 08:00) (91% - 99%)      REVIEW OF SYSTEMS:     Constitutional: No fever, chills, fatigue, weakness  Eyes: no eye pain, visual disturbances, or discharge  ENT:  No difficulty hearing, tinnitus, vertigo; No sinus or throat pain  Neck: No pain or stiffness  Respiratory: No cough, dyspnea, wheezing   Cardiovascular: No chest pain, palpitations,   Gastrointestinal: No abdominal or epigastric pain. No nausea, vomiting  No diarrhea or constipation.   Genitourinary: No dysuria, frequency, hematuria or incontinence  Neurological: No headaches, lightheadedness, vertigo, numbness or tremors  Psychiatric: No depression, anxiety, mood swings or difficulty sleeping  Musculoskeletal: positive diffuse  pain  Skin: No itching, burning, rashes or lesions   Lymph Nodes: No enlarged glands  Endocrine: No heat or cold intolerance; No hair loss   Allergy and Immunologic: No hives or eczema    On Neurological Examination:    Mental Status - Patient is alert, awake,   loc hospital  year 18  month sept      Follow simple commands      Speech -   Fluent                      Cranial Nerves - Pupils 3 mm equal and reactive to light,   extraocular eye movements intact.   smile symmetric  intact bilateral NLF    Motor Exam -   Right upper 3/5  Left upper3/5  Right lower2/5  Left lower 2/5    Muscle tone - is normal all over.  No asymmetry is seen.      Sensory    Bilateral intact to light touch      GENERAL Exam: Nontoxic , No Acute Distress   	  HEENT:  normocephalic, atraumatic  		  LUNGS:  Decreased bilaterally  	  HEART: Normal S1S2   No murmur RRR        	  GI/ ABDOMEN:  Soft  Non tender    EXTREMITIES:   No Edema  No Clubbing  No Cyanosis     MUSCULOSKELETAL: decreased  Range of Motion all 4 ext   	   SKIN: Normal  No Ecchymosis               LABS:  CBC Full  -  ( 06 Oct 2018 16:16 )  WBC Count : x  Hemoglobin : 8.3 g/dL  Hematocrit : 26.3 %  Platelet Count - Automated : x  Mean Cell Volume : x  Mean Cell Hemoglobin : x  Mean Cell Hemoglobin Concentration : x  Auto Neutrophil # : x  Auto Lymphocyte # : x  Auto Monocyte # : x  Auto Eosinophil # : x  Auto Basophil # : x  Auto Neutrophil % : x  Auto Lymphocyte % : x  Auto Monocyte % : x  Auto Eosinophil % : x  Auto Basophil % : x    Urinalysis Basic - ( 06 Oct 2018 14:53 )    Color: Yellow / Appearance: Clear / S.015 / pH: x  Gluc: x / Ketone: Small  / Bili: Small / Urobili: Negative   Blood: x / Protein: 25 mg/dL / Nitrite: Negative   Leuk Esterase: Trace / RBC: 6-10 /HPF / WBC 0-2   Sq Epi: x / Non Sq Epi: Occasional / Bacteria: Occasional      10-    144  |  115<H>  |  35<H>  ----------------------------<  140<H>  4.1   |  17<L>  |  1.40<H>    Ca    7.0<L>      06 Oct 2018 05:32  Mg     2.1     10-05    TPro  6.9  /  Alb  2.7<L>  /  TBili  0.9  /  DBili  x   /  AST  53<H>  /  ALT  27  /  AlkPhos  99  10-05    Hemoglobin A1C:     LIVER FUNCTIONS - ( 05 Oct 2018 12:53 )  Alb: 2.7 g/dL / Pro: 6.9 g/dL / ALK PHOS: 99 U/L / ALT: 27 U/L / AST: 53 U/L / GGT: x           Vitamin B12   PT/INR - ( 06 Oct 2018 05:32 )   PT: 25.3 sec;   INR: 2.28 ratio         PTT - ( 06 Oct 2018 05:32 )  PTT:31.9 sec      RADIOLOGY    ANALYSIS AND PLAN:  This is a 73-year-old with an episode of unresponsiveness.  1.	For episode of unresponsive, suspect this is multifactorial secondary to underlying respiratory issues.   Also, suspect secondary to cerebral hypoperfusion from hypotension from low hemoglobin and hematocrit, and also possibly underlying bleeding type of disorder.  2.	I would recommend if possible please maintain a systolic blood pressure greater than 100.  3.	Monitor the patient's systolic blood pressure.  4.	Monitor hemoglobin and hematocrit.  5.	spoke to staff events noted will restart low dose seroquel recommend psych follow up   6.	Monitor the patient's electrolytes.  7.	Spoke with the sister at the bedside, who is the healthcare proxy.  Her name is Demi, telephone number is [phone].  They understand overall thought process.  At present, the patient's prognosis is poor to guarded.  8.	Greater than 85 minutes of time was spent with the patient, plan of care, reviewing data, speaking to the family and multidisciplinary healthcare team.    Thank you for the courtesy of this consultation.      Physical therapy evaluation it possible  OOB to chair/ambulation with assistance only.    Advanced care planning was discussed with family.    Greater than 45 minutes spent in direct patient care reviewing  the notes, lab data/ imaging , discussion with multidisciplinary team.

## 2018-10-07 NOTE — PROGRESS NOTE ADULT - SUBJECTIVE AND OBJECTIVE BOX
All interim records and events noted.    awake, alert, responsive, answering appropriately today  sister and brother in law present, reports MS improving starting last night. Reports pt actually stopped eating and did not take any meds for 6 weeks.      MEDICATIONS  (STANDING):  ALBUTerol    0.083% 2.5 milliGRAM(s) Nebulizer every 6 hours  aztreonam  IVPB 2000 milliGRAM(s) IV Intermittent every 8 hours  lactobacillus acidophilus 1 Tablet(s) Oral three times a day with meals  metroNIDAZOLE  IVPB      metroNIDAZOLE  IVPB 500 milliGRAM(s) IV Intermittent every 8 hours  nystatin Powder 1 Application(s) Topical two times a day  QUEtiapine 100 milliGRAM(s) Oral at bedtime  sodium bicarbonate 650 milliGRAM(s) Oral three times a day  tiotropium 18 MICROgram(s) Capsule 1 Capsule(s) Inhalation daily  vancomycin  IVPB 1500 milliGRAM(s) IV Intermittent every 12 hours  vancomycin  IVPB      venlafaxine XR. 150 milliGRAM(s) Oral daily    MEDICATIONS  (PRN):  acetaminophen  Suppository .. 650 milliGRAM(s) Rectal four times a day PRN Temp greater or equal to 38C (100.4F)  morphine  - Injectable 2 milliGRAM(s) IV Push every 6 hours PRN pain/ distress  ondansetron Injectable 4 milliGRAM(s) IV Push every 6 hours PRN Nausea  promethazine Suppository 12.5 milliGRAM(s) Rectal four times a day PRN breakthrough nausea / vomiting  sodium chloride 0.65% Nasal 1 Spray(s) Both Nostrils four times a day PRN Nasal Congestion      Vital Signs Last 24 Hrs  T(C): 36.5 (07 Oct 2018 08:00), Max: 37.1 (06 Oct 2018 19:55)  T(F): 97.7 (07 Oct 2018 08:00), Max: 98.8 (06 Oct 2018 19:55)  HR: 90 (07 Oct 2018 08:00) (86 - 108)  BP: 111/65 (07 Oct 2018 08:00) (101/61 - 137/72)  BP(mean): --  RR: 17 (07 Oct 2018 08:00) (17 - 20)  SpO2: 95% (07 Oct 2018 08:00) (91% - 99%)    PHYSICAL EXAM  General: overweight chronically ill appearing man in bed, in no acute distress  Head: atraumatic, normocephalic  ENT: sclera anicteric, buccal mucosa moist  Neck: supple, trachea midline  CV: S1 S2, regular rate and rhythm  Lungs: clear to auscultation, no wheezes/rhonchi  Abdomen: soft, epigastric pain to palp, bowel sounds present, pouchy/obese, no palpable masses  Extrem: no sig edema  Skin: ecchymosis jannette pretibial and right upper inner arm  Neuro: alert and oriented, responsive      LABS:             8.3    x     )-----------( x        ( 10-06 @ 16:16 )             26.3                7.5    x     )-----------( x        ( 10-06 @ 05:32 )             23.8                9.3    8.62  )-----------( 182      ( 10-06 @ 00:22 )             29.4                9.3    8.80  )-----------( 212      ( 10-05 @ 12:53 )             29.2       10-06    144  |  115<H>  |  35<H>  ----------------------------<  140<H>  4.1   |  17<L>  |  1.40<H>    Ca    7.0<L>      06 Oct 2018 05:32  Mg     2.1     10-05    TPro  6.9  /  Alb  2.7<L>  /  TBili  0.9  /  DBili  x   /  AST  53<H>  /  ALT  27  /  AlkPhos  99  10-05    10-06 @ 05:32  PT25.3 INR2.28  PTT31.9  10-05 @ 20:05  PT23.5 INR2.12  PTT33.5  10-05 @ 13:23  PT-- INR--  PTT32.4          RADIOLOGY & ADDITIONAL STUDIES:    IMPRESSION/RECOMMENDATIONS:

## 2018-10-07 NOTE — CONSULT NOTE ADULT - REASON FOR ADMISSION
not feeling well

## 2018-10-07 NOTE — CONSULT NOTE ADULT - SUBJECTIVE AND OBJECTIVE BOX
Chief Complaint:  Patient is a 73y old  Male who presents with a chief complaint of not feeling well   · Chief Complaint: The patient is a 73y Male complaining of abdominal pain.	  · HPI Objective Statement: pt bib ems from snf for abd pain diarrhea. pt denies pain, only c/o sob. pt poor historian, unable to provide reliable hx. pmd - dani	  · Presenting Symptoms: DIARRHEA, PAIN, sob	  · Negative Findings: no vomiting	  · Location: abdomen	  · Timing: unknown	  · Severity: MILD	  · Context: unknown	  · Aggravated Factors: none	  · Relieving Factors: none	  loose bm lasting for days no recent travel pot had colonosocpy not sure when    Allergies:  penicillin (Unknown)  strawberry (Unknown)      Medications:  acetaminophen  Suppository .. 650 milliGRAM(s) Rectal four times a day PRN  ALBUTerol    0.083% 2.5 milliGRAM(s) Nebulizer every 6 hours  aztreonam  IVPB 2000 milliGRAM(s) IV Intermittent every 8 hours  buDESOnide   0.5 milliGRAM(s) Respule 0.5 milliGRAM(s) Inhalation every 12 hours  lactobacillus acidophilus 1 Tablet(s) Oral three times a day with meals  metroNIDAZOLE    Tablet 500 milliGRAM(s) Oral every 8 hours  morphine  - Injectable 2 milliGRAM(s) IV Push every 6 hours PRN  nystatin Powder 1 Application(s) Topical two times a day  ondansetron Injectable 4 milliGRAM(s) IV Push every 6 hours PRN  promethazine Suppository 12.5 milliGRAM(s) Rectal four times a day PRN  QUEtiapine 100 milliGRAM(s) Oral at bedtime  sodium bicarbonate 650 milliGRAM(s) Oral three times a day  sodium chloride 0.65% Nasal 1 Spray(s) Both Nostrils four times a day PRN  tiotropium 18 MICROgram(s) Capsule 1 Capsule(s) Inhalation daily  venlafaxine XR. 150 milliGRAM(s) Oral daily      PMHX/PSHX:  Myocardial infarct, old  Gastroesophageal reflux disease, esophagitis presence not specified  Other pulmonary embolism with acute cor pulmonale, unspecified chronicity  Acute congestive heart failure, unspecified heart failure type  Cellulitis, unspecified cellulitis site  Candida infection  Acute congestive heart failure, unspecified heart failure type  Rheumatoid arthritis with positive rheumatoid factor, involving unspecified site  Primary osteoarthritis, unspecified site  Chronic atrial fibrillation  Bipolar 1 disorder  Angina at rest  COPD mixed type  Essential hypertension  Morbid obesity      Family history:  No pertinent family history in first degree relatives      Social History: snf no etoh no cigs no ivda    ROS:     General:  No wt loss, fevers, chills, night sweats, fatigue,   Eyes:  Good vision, no reported pain  ENT:  No sore throat, pain, runny nose, dysphagia  CV:  No pain, palpitations, hypo/hypertension  Resp:  No dyspnea, cough, tachypnea, wheezing  GI:  No pain, No nausea, No vomiting, No diarrhea, No constipation, No weight loss, No fever, No pruritis, No rectal bleeding, No tarry stools, No dysphagia,  :  No pain, bleeding, incontinence, nocturia  Muscle:  No pain, weakness  Neuro:  No weakness, tingling, memory problems  Psych:  No fatigue, insomnia, mood problems, depression  Endocrine:  No polyuria, polydipsia, cold/heat intolerance  Heme:  No petechiae, ecchymosis, easy bruisability  Skin:  No rash, tattoos, scars, edema      PHYSICAL EXAM:   Vital Signs:  Vital Signs Last 24 Hrs  T(C): 37.2 (07 Oct 2018 15:57), Max: 37.2 (07 Oct 2018 15:57)  T(F): 98.9 (07 Oct 2018 15:57), Max: 98.9 (07 Oct 2018 15:57)  HR: 120 (07 Oct 2018 15:57) (90 - 120)  BP: 107/68 (07 Oct 2018 15:57) (100/56 - 137/72)  BP(mean): --  RR: 17 (07 Oct 2018 15:57) (16 - 20)  SpO2: 90% (07 Oct 2018 15:57) (90% - 96%)  Daily     Daily Weight in k.2 (07 Oct 2018 05:21)    GENERAL:  Appears stated age, well-groomed, well-nourished, no distress  HEENT:  NC/AT,  conjunctivae clear and pink, no thyromegaly, nodules, adenopathy, no JVD, sclera -anicteric  CHEST:  Full & symmetric excursion, no increased effort, breath sounds clear  HEART:  Regular rhythm, S1, S2, no murmur/rub/S3/S4, no abdominal bruit, no edema  ABDOMEN:  Soft, non-tender, non-distended, normoactive bowel sounds,  no masses ,no hepato-splenomegaly, no signs of chronic liver disease  EXTEREMITIES:  no cyanosis,clubbing or edema  SKIN:  No rash/erythema/ecchymoses/petechiae/wounds/abscess/warm/dry  NEURO:  Alert, oriented, no asterixis, no tremor, no encephalopathy    LABS:                        8.2    6.50  )-----------( 126      ( 07 Oct 2018 15:01 )             25.2     10    144  |  115<H>  |  30<H>  ----------------------------<  161<H>  3.9   |  19<L>  |  1.40<H>    Ca    7.9<L>      07 Oct 2018 15:01  Mg     2.1     10-05        PT/INR - ( 07 Oct 2018 15:01 )   PT: 29.9 sec;   INR: 2.69 ratio         PTT - ( 07 Oct 2018 15:01 )  PTT:35.3 sec  Urinalysis Basic - ( 06 Oct 2018 14:53 )    Color: Yellow / Appearance: Clear / S.015 / pH: x  Gluc: x / Ketone: Small  / Bili: Small / Urobili: Negative   Blood: x / Protein: 25 mg/dL / Nitrite: Negative   Leuk Esterase: Trace / RBC: 6-10 /HPF / WBC 0-2   Sq Epi: x / Non Sq Epi: Occasional / Bacteria: Occasional          Imaging:

## 2018-10-07 NOTE — PROGRESS NOTE ADULT - SUBJECTIVE AND OBJECTIVE BOX
Date/Time Patient Seen:  		  Referring MD:   Data Reviewed	       Patient is a 73y old  Male who presents with a chief complaint of not feeling well (06 Oct 2018 16:35)    in bed  on o2 support  more alert      Subjective/HPI     PAST MEDICAL & SURGICAL HISTORY:  Myocardial infarct, old  Gastroesophageal reflux disease, esophagitis presence not specified  Other pulmonary embolism with acute cor pulmonale, unspecified chronicity  Acute congestive heart failure, unspecified heart failure type  Cellulitis, unspecified cellulitis site: abdomen buttocks  Candida infection  Acute congestive heart failure, unspecified heart failure type  Rheumatoid arthritis with positive rheumatoid factor, involving unspecified site  Primary osteoarthritis, unspecified site  Chronic atrial fibrillation  Bipolar 1 disorder  Angina at rest  COPD mixed type  Essential hypertension  Morbid obesity        Medication list         MEDICATIONS  (STANDING):  ALBUTerol    0.083% 2.5 milliGRAM(s) Nebulizer every 6 hours  aztreonam  IVPB 2000 milliGRAM(s) IV Intermittent every 8 hours  lactobacillus acidophilus 1 Tablet(s) Oral three times a day with meals  metroNIDAZOLE  IVPB      metroNIDAZOLE  IVPB 500 milliGRAM(s) IV Intermittent every 8 hours  nystatin Powder 1 Application(s) Topical two times a day  sodium bicarbonate 650 milliGRAM(s) Oral three times a day  tiotropium 18 MICROgram(s) Capsule 1 Capsule(s) Inhalation daily  vancomycin  IVPB 1500 milliGRAM(s) IV Intermittent every 12 hours  vancomycin  IVPB      venlafaxine XR. 150 milliGRAM(s) Oral daily    MEDICATIONS  (PRN):  acetaminophen  Suppository .. 650 milliGRAM(s) Rectal four times a day PRN Temp greater or equal to 38C (100.4F)  morphine  - Injectable 2 milliGRAM(s) IV Push every 6 hours PRN pain/ distress  ondansetron Injectable 4 milliGRAM(s) IV Push every 6 hours PRN Nausea  promethazine Suppository 12.5 milliGRAM(s) Rectal four times a day PRN breakthrough nausea / vomiting  sodium chloride 0.65% Nasal 1 Spray(s) Both Nostrils four times a day PRN Nasal Congestion         Vitals log        ICU Vital Signs Last 24 Hrs  T(C): 36.7 (07 Oct 2018 05:21), Max: 37.1 (06 Oct 2018 19:55)  T(F): 98 (07 Oct 2018 05:21), Max: 98.8 (06 Oct 2018 19:55)  HR: 97 (07 Oct 2018 05:21) (77 - 101)  BP: 101/61 (06 Oct 2018 23:54) (92/62 - 128/90)  BP(mean): --  ABP: --  ABP(mean): --  RR: 18 (07 Oct 2018 05:21) (17 - 20)  SpO2: 95% (07 Oct 2018 05:21) (92% - 99%)           Input and Output:  I&O's Detail    06 Oct 2018 07:01  -  07 Oct 2018 06:36  --------------------------------------------------------  IN:    IV PiggyBack: 800 mL    Packed Red Blood Cells: 308 mL  Total IN: 1108 mL    OUT:    Indwelling Catheter - Urethral: 800 mL  Total OUT: 800 mL    Total NET: 308 mL          Lab Data                        8.3    x     )-----------( x        ( 06 Oct 2018 16:16 )             26.3     10-06    144  |  115<H>  |  35<H>  ----------------------------<  140<H>  4.1   |  17<L>  |  1.40<H>    Ca    7.0<L>      06 Oct 2018 05:32  Mg     2.1     10-05    TPro  6.9  /  Alb  2.7<L>  /  TBili  0.9  /  DBili  x   /  AST  53<H>  /  ALT  27  /  AlkPhos  99  10-05    ABG - ( 06 Oct 2018 02:28 )  pH, Arterial: 7.38  pH, Blood: x     /  pCO2: 28    /  pO2: 77    / HCO3: 18    / Base Excess: -7.8  /  SaO2: 94                      Review of Systems	      Objective     Physical Examination    heart s1s2  lung dec BS    Pertinent Lab findings & Imaging      Harjinder:  NO   Adequate UO     I&O's Detail    06 Oct 2018 07:01  -  07 Oct 2018 06:36  --------------------------------------------------------  IN:    IV PiggyBack: 800 mL    Packed Red Blood Cells: 308 mL  Total IN: 1108 mL    OUT:    Indwelling Catheter - Urethral: 800 mL  Total OUT: 800 mL    Total NET: 308 mL               Discussed with:     Cultures:	        Radiology

## 2018-10-07 NOTE — PROGRESS NOTE ADULT - SUBJECTIVE AND OBJECTIVE BOX
[facility] -- Madai Farah, Lisa, Kalyan, Paul Wiley Savella  Office # [phone]      Follow Up:  Hypotension, Irregular Heart beat    Subjective/Observations: Seen and examined.  Lying flat in bed awake but confused.  NAD.  No signs of orthopnea or PND.        REVIEW OF SYSTEMS: All other review of systems is negative unless indicated above    PAST MEDICAL & SURGICAL HISTORY:  Myocardial infarct, old  Gastroesophageal reflux disease, esophagitis presence not specified  Other pulmonary embolism with acute cor pulmonale, unspecified chronicity  Acute congestive heart failure, unspecified heart failure type  Cellulitis, unspecified cellulitis site: abdomen buttocks  Candida infection  Acute congestive heart failure, unspecified heart failure type  Rheumatoid arthritis with positive rheumatoid factor, involving unspecified site  Primary osteoarthritis, unspecified site  Chronic atrial fibrillation  Bipolar 1 disorder  Angina at rest  COPD mixed type  Essential hypertension  Morbid obesity      MEDICATIONS  (STANDING):  ALBUTerol    0.083% 2.5 milliGRAM(s) Nebulizer every 6 hours  aztreonam  IVPB 2000 milliGRAM(s) IV Intermittent every 8 hours  buDESOnide   0.5 milliGRAM(s) Respule 0.5 milliGRAM(s) Inhalation every 12 hours  lactobacillus acidophilus 1 Tablet(s) Oral three times a day with meals  metroNIDAZOLE  IVPB      metroNIDAZOLE  IVPB 500 milliGRAM(s) IV Intermittent every 8 hours  nystatin Powder 1 Application(s) Topical two times a day  QUEtiapine 100 milliGRAM(s) Oral at bedtime  sodium bicarbonate 650 milliGRAM(s) Oral three times a day  tiotropium 18 MICROgram(s) Capsule 1 Capsule(s) Inhalation daily  vancomycin  IVPB 1500 milliGRAM(s) IV Intermittent every 12 hours  vancomycin  IVPB      venlafaxine XR. 150 milliGRAM(s) Oral daily    MEDICATIONS  (PRN):  acetaminophen  Suppository .. 650 milliGRAM(s) Rectal four times a day PRN Temp greater or equal to 38C (100.4F)  morphine  - Injectable 2 milliGRAM(s) IV Push every 6 hours PRN pain/ distress  ondansetron Injectable 4 milliGRAM(s) IV Push every 6 hours PRN Nausea  promethazine Suppository 12.5 milliGRAM(s) Rectal four times a day PRN breakthrough nausea / vomiting  sodium chloride 0.65% Nasal 1 Spray(s) Both Nostrils four times a day PRN Nasal Congestion      Allergies    penicillin (Unknown)  strawberry (Unknown)    Intolerances            Vital Signs Last 24 Hrs  T(C): 36.4 (07 Oct 2018 11:55), Max: 37.1 (06 Oct 2018 19:55)  T(F): 97.6 (07 Oct 2018 11:55), Max: 98.8 (06 Oct 2018 19:55)  HR: 100 (07 Oct 2018 11:55) (86 - 108)  BP: 100/56 (07 Oct 2018 11:55) (100/56 - 137/72)  BP(mean): --  RR: 16 (07 Oct 2018 11:55) (16 - 20)  SpO2: 90% (07 Oct 2018 11:55) (90% - 98%)    I&O's Summary    06 Oct 2018 07:01  -  07 Oct 2018 07:00  --------------------------------------------------------  IN: 1108 mL / OUT: 800 mL / NET: 308 mL    07 Oct 2018 07:01  -  07 Oct 2018 14:30  --------------------------------------------------------  IN: 200 mL / OUT: 0 mL / NET: 200 mL          PHYSICAL EXAM:  TELE: SR with Sinus Arrythmia 110-115 with PVCs  Constitutional: NAD, awake and alert, morbidly obese   HEENT: Moist Mucous Membranes, Anicteric  Pulmonary: Non-labored, breath sounds are clear anteriorly, difficult to assess given body habitus  Cardiovascular: Regular, S1 and S2, No murmurs, rubs, gallops or clicks  Gastrointestinal: Bowel Sounds present, soft, nontender. Obese abdomen  Lymph: peripheral edema. No lymphadenopathy.  Skin: No visible rashes or ulcers.  Psych:  Mood & affect confused    LABS: All Labs Reviewed:                        8.3    x     )-----------( x        ( 06 Oct 2018 16:16 )             26.3                         7.5    x     )-----------( x        ( 06 Oct 2018 05:32 )             23.8                         9.3    8.62  )-----------( 182      ( 06 Oct 2018 00:22 )             29.4     06 Oct 2018 05:32    144    |  115    |  35     ----------------------------<  140    4.1     |  17     |  1.40   05 Oct 2018 12:53    140    |  111    |  38     ----------------------------<  151    4.7     |  18     |  1.60     Ca    7.0        06 Oct 2018 05:32  Ca    7.9        05 Oct 2018 12:53  Mg     2.1       05 Oct 2018 20:05    TPro  6.9    /  Alb  2.7    /  TBili  0.9    /  DBili  x      /  AST  53     /  ALT  27     /  AlkPhos  99     05 Oct 2018 12:53    PT/INR - ( 06 Oct 2018 05:32 )   PT: 25.3 sec;   INR: 2.28 ratio         PTT - ( 06 Oct 2018 05:32 )  PTT:31.9 sec    < from: 12 Lead ECG (10.05.18 @ 13:13) >  Ventricular Rate 116 BPM    Atrial Rate 116 BPM    P-R Interval 136 ms    QRS Duration 82 ms    Q-T Interval 388 ms    QTC Calculation(Bezet) 539 ms    P Axis -16 degrees    R Axis 0 degrees    T Axis 3 degrees    Diagnosis Line Sinus tachycardia with premature atrial complexes  ST & T wave abnormality, consider anterior ischemia  Abnormal ECG  No previous ECGs available  Confirmed by cuco Ahuja ([id number]) on 10/5/2018 7:28:09 PM    < end of copied text >  < from: CT Head No Cont (10.06.18 @ 04:40) >  EXAM:  CT BRAIN                            PROCEDURE DATE:  10/06/2018          INTERPRETATION:  10/6/2018 5:10 AM    CLINICAL HISTORY:  Altered mental status.    TECHNIQUE: Axial CT images are obtained from the cranial vertex to the   skullbase without the administration of IV contrast.    COMPARISON: None    FINDINGS:    There is volume loss accounting for prominent ventricles. There is   atherosclerosis. White matter hypodensities noted compatible with   moderate chronic microvascular ischemic change in this age group. There   is no midline shift, mass effect, extra axial collection, or intracranial   hemorrhage.    The calvarium is intact. The visualized paranasal sinuses are aerated.   The mastoid air cells are clear.    IMPRESSION:    No acute hemorrhage or mass effect. Chronic changes noted                OVI FRANCO M.D., ATTENDING RADIOLOGIST  This document has been electronically signed. Oct  6 2018  5:14AM    < end of copied text >  < from: Xray Chest 1 View- PORTABLE-Routine (10.06.18 @ 10:18) >  EXAM:  XR CHEST PORTABLE ROUTINE 1V                            PROCEDURE DATE:  10/06/2018          INTERPRETATION:  Clinical information: Hypoxia    Portable study, 10:11 AM.    Comparison study dated 10/5/2018.    Cardiac monitor leads present. The patient is tilted towards the left.    Shallow inspiration, elevated diaphragm .  There are prominent   bronchovascular markings in the perihilar regions, this could be related   to vascular congestion although findings may be accentuated due to the   patient's body habitus. Heart size within normal limits. No acute osseous   abnormalities.    Recommend a follow-up exam with deeper inspiration if clinically   applicable.    IMPRESSION: See above report                ESTEFANI MENDOZA M.D.,ATTENDING RADIOLOGIST  This document has been electronically signed. Oct  6 2018 10:21AM        < end of copied text >

## 2018-10-07 NOTE — PROGRESS NOTE ADULT - SUBJECTIVE AND OBJECTIVE BOX
ID Progress note     Name: TIMOTEO PEDERSEN  Age: 73y  Gender: Male  MRN: [id number]    Interval History-- Events noted, more awake and responsive, complaints of pain , unable to move LE . Incontinent  of stool ,   Notes reviewed    Past Medical History--  Myocardial infarct, old  Gastroesophageal reflux disease, esophagitis presence not specified  Other pulmonary embolism with acute cor pulmonale, unspecified chronicity  Acute congestive heart failure, unspecified heart failure type  Cellulitis, unspecified cellulitis site  Candida infection  Acute congestive heart failure, unspecified heart failure type  Rheumatoid arthritis with positive rheumatoid factor, involving unspecified site  Primary osteoarthritis, unspecified site  Chronic atrial fibrillation  Bipolar 1 disorder  Angina at rest  COPD mixed type  Essential hypertension  Morbid obesity      For details regarding the patient's social history, family history, and other miscellaneous elements, please refer the initial infectious diseases consultation and/or the admitting history and physical examination for this admission.    Allergies--  Allergies    penicillin (Unknown)  strawberry (Unknown)    Intolerances        Medications--  Antibiotics:  aztreonam  IVPB 2000 milliGRAM(s) IV Intermittent every 8 hours  metroNIDAZOLE  IVPB      metroNIDAZOLE  IVPB 500 milliGRAM(s) IV Intermittent every 8 hours  vancomycin  IVPB 1500 milliGRAM(s) IV Intermittent every 12 hours  vancomycin  IVPB        Immunologic:    Other:  acetaminophen  Suppository .. PRN  ALBUTerol    0.083%  buDESOnide   0.5 milliGRAM(s) Respule  lactobacillus acidophilus  morphine  - Injectable PRN  nystatin Powder  ondansetron Injectable PRN  promethazine Suppository PRN  QUEtiapine  sodium bicarbonate  sodium chloride 0.65% Nasal PRN  tiotropium 18 MICROgram(s) Capsule  venlafaxine XR.      Review of Systems--  Review of systems unable to be obtained secondary to clinical condition.    Physical Examination--    Vital Signs: T(F): 97.6 (10-07-18 @ 11:55), Max: 98.8 (10-06-18 @ 19:55)  HR: 100 (10-07-18 @ 11:55)  BP: 100/56 (10-07-18 @ 11:55)  RR: 16 (10-07-18 @ 11:55)  SpO2: 90% (10-07-18 @ 11:55)  Wt(kg): --  General: Awake and alert , Morbidly obese   Eyes: sclera anicteric, pupils equal and reactive to light, conjunctival pallor   ENMT: buccal mucosa moist, pharynx not injected  Neck: supple, trachea midline  Lungs: clear, no wheeze/rhonchi  Cardiovascular: irregular rate and rhythm, S1 S2  Abdomen: soft, nontender, obese, large pannus , superficial  wounds on abdominal wall,  from no organomegaly present, bowel sounds normal  Neurological:  alert and oriented x3, Cranial Nerves II-XII grossly intact  Skin: no increased ecchymosis/petechiae/purpura has unstageable buttock DU 10 cm x 18 cm   Lymph Nodes: no palpable cervical/supraclavicular lymph nodes enlargements,   Extremities: no cyanosis/clubbing, has 2 + leg edema with  chronic venous stasis dermatitis       Laboratory Studies--  CBC                        8.3    x     )-----------( x        ( 06 Oct 2018 16:16 )             26.3       Chemistries  10-06    144  |  115<H>  |  35<H>  ----------------------------<  140<H>  4.1   |  17<L>  |  1.40<H>    Ca    7.0<L>      06 Oct 2018 05:32  Mg     2.1     10-05    Procalcitonin, Serum (10.05.18 @ 20:05)    Procalcitonin, Serum: 0.81:        Culture Data    MRSA/MSSA PCR (10.06.18 @ 20:04)    MRSA PCR Result.: Not Detec:     Staph Aureus PCR Result: Detected    Culture - Blood (collected 05 Oct 2018 21:31)  Source: .Blood Blood  Preliminary Report (06 Oct 2018 22:01):    No growth to date.    Culture - Blood (collected 05 Oct 2018 21:31)  Source: .Blood Blood  Preliminary Report (06 Oct 2018 22:01):    No growth to date.        Radiology:  Xray Chest 1 View- PORTABLE-Routine (10.06.18 @ 10:18) >  Shallow inspiration, elevated diaphragm .  There are prominent   bronchovascular markings in the perihilar regions, this could be related   to vascular congestion although findings may be accentuated due to the   patient's body habitus. Heart size within normal limits. No acute osseous   abnormalities.    Recommend a follow-up exam with deeper inspiration if clinically   applicable.    IMPRESSION: See above report      CT Head No Cont (10.06.18 @ 04:40) >  FINDINGS:    There is volume loss accounting for prominent ventricles. There is   atherosclerosis. White matter hypodensities noted compatible with   moderate chronic microvascular ischemic change in this age group. There   is no midline shift, mass effect, extra axial collection, or intracranial   hemorrhage.    The calvarium is intact. The visualized paranasal sinuses are aerated.   The mastoid air cells are clear.    IMPRESSION:    No acute hemorrhage or mass effect. Chronic changes noted      CT Abdomen and Pelvis w/ Oral Cont (10.05.18 @ 15:22) >  Image quality limited by body habitus. Portions of the panniculus are   excluded from the field-of-view.    There is interval development of mild fluffy airspace infiltrate in both   lung bases without segmental consolidation, cavitation or effusion.    The gallbladder is surgically absent. The pancreas is fatty replaced.   Spleen is enlarged, measuring 14.7 cm in AP dimension. The liver and   adrenals and appendix are grossly normal given limitations of a   noncontrast exam. There are low-attenuation lesions in both kidneys   consistent with small cysts. There are small nonobstructive stones on the   right. An IVC filter is in place. There is no bowel dilatation or ascites   or adenopathy or focal inflammation of the peritoneal fat. Urinary   bladder is grossly normal. Theprostate is mildly enlarged.    IMPRESSION:    Mild bibasilar pneumonia or pneumonitis, new. No acute abdominal or   pelvic findings    Assessment--  73 year old male hx of morbid obesity, bipolar disorder a LTC resident of SNF, bed bound for over 2 years sent in to ER with complaints of increasing SOB, abdominal pains and diarrhea and increased lethargy , noted to be obtunded and poorly responsive, ABG does not show hypercapnia and ct head was negative . he is afebrile and has normal WBC so doubt its all from infection. he most likely has metabolic encephalopathy. he could have a CNS event although ct head negative . He has elevated P BNP but no evidence of CHF as per cardiology     He has multiple open wounds and bilateral unstageable DU on both buttocks , so that could be a source of infection.     Doubt he has any active infection     Plan :   - will continue with  Azactam to 2 grams q 8 , change to po  Flagyl for possible aspiration Pneumonia .  - DC  Vancomycin as  MRSA screen is negative   - trend labs   - obtain [facility] from [facility]  - wound care evaluation     Continue with present regime .  Appropriate use of antibiotics and adverse effects reviewed.    I have discussed the above plan of care with patient's family in detail. They expressed understanding of the treatment plan . Risks, benefits and alternatives discussed in detail. I have asked if they have any questions or concerns and appropriately addressed them to the best of my ability .      > 35 minutes spent in direct patient care reviewing  the notes, lab data/ imaging , discussion with multidisciplinary team. All questions were addressed and answered to the best of my capacity .    Thank you for allowing me to participate in the care of your patient .        Derrek Corbin MD  [phone]

## 2018-10-07 NOTE — CONSULT NOTE ADULT - CONSULT REQUESTED DATE/TIME
05-Oct-2018 19:08
06-Oct-2018 11:58
06-Oct-2018 13:40
06-Oct-2018 15:02
06-Oct-2018 16:35
06-Oct-2018 10:10
07-Oct-2018 17:48

## 2018-10-07 NOTE — CONSULT NOTE ADULT - CONSULT REASON
AMS rule out occult infection
Azotemia
Evaluation for decision making capacity
anemia, precipitous drop in Hct
pna  atelectasis  copd  obesity  colitis
Hypotension, irregular heart rate
diarrhea

## 2018-10-07 NOTE — PROGRESS NOTE ADULT - SUBJECTIVE AND OBJECTIVE BOX
Patient is a 73y old  Male who presents with a chief complaint of not feeling well (07 Oct 2018 17:48)      INTERVAL /OVERNIGHT EVENTS: alert awake, mental state returned to baseline    MEDICATIONS  (STANDING):  ALBUTerol    0.083% 2.5 milliGRAM(s) Nebulizer every 6 hours  aztreonam  IVPB 2000 milliGRAM(s) IV Intermittent every 8 hours  buDESOnide   0.5 milliGRAM(s) Respule 0.5 milliGRAM(s) Inhalation every 12 hours  cholestyramine Powder (Sugar-Free) 4 Gram(s) Oral daily  famotidine    Tablet 20 milliGRAM(s) Oral two times a day  lactobacillus acidophilus 1 Tablet(s) Oral three times a day with meals  metroNIDAZOLE    Tablet 500 milliGRAM(s) Oral every 8 hours  nystatin Powder 1 Application(s) Topical two times a day  QUEtiapine 100 milliGRAM(s) Oral at bedtime  sodium bicarbonate 650 milliGRAM(s) Oral three times a day  tiotropium 18 MICROgram(s) Capsule 1 Capsule(s) Inhalation daily  venlafaxine XR. 150 milliGRAM(s) Oral daily    MEDICATIONS  (PRN):  acetaminophen  Suppository .. 650 milliGRAM(s) Rectal four times a day PRN Temp greater or equal to 38C (100.4F)  morphine  - Injectable 2 milliGRAM(s) IV Push every 6 hours PRN pain/ distress  ondansetron Injectable 4 milliGRAM(s) IV Push every 6 hours PRN Nausea  promethazine Suppository 12.5 milliGRAM(s) Rectal four times a day PRN breakthrough nausea / vomiting  sodium chloride 0.65% Nasal 1 Spray(s) Both Nostrils four times a day PRN Nasal Congestion      Allergies    penicillin (Unknown)  strawberry (Unknown)    Intolerances        REVIEW OF SYSTEMS: unable to obtain    Vital Signs Last 24 Hrs  T(C): 37.2 (07 Oct 2018 15:57), Max: 37.2 (07 Oct 2018 15:57)  T(F): 98.9 (07 Oct 2018 15:57), Max: 98.9 (07 Oct 2018 15:57)  HR: 120 (07 Oct 2018 15:57) (90 - 120)  BP: 107/68 (07 Oct 2018 15:57) (100/56 - 137/72)  BP(mean): --  RR: 17 (07 Oct 2018 15:57) (16 - 20)  SpO2: 90% (07 Oct 2018 15:57) (90% - 96%)    PHYSICAL EXAM:  GENERAL: NAD, well-groomed, well-developed  HEAD:  Atraumatic, Normocephalic  EYES: EOMI, PERRLA, conjunctiva and sclera clear  ENMT: No tonsillar erythema, exudates, or enlargement; Moist mucous membranes, Good dentition, No lesions  NECK: Supple, No JVD, Normal thyroid  NERVOUS SYSTEM:  Alert & awake, Good concentration; Motor Strength 5/5 B/L upper and lower extremities; DTRs 2+ intact and symmetric  CHEST/LUNG: Clear to auscultation bilaterally; No rales, rhonchi, wheezing, or rubs  HEART: Regular rate and rhythm; No murmurs, rubs, or gallops  ABDOMEN: Soft, Nontender, Nondistended; Bowel sounds present  EXTREMITIES:  2+ Peripheral Pulses, No clubbing, cyanosis, or edema  LYMPH: No lymphadenopathy noted  SKIN: No rashes or lesions    LABS:                        8.2    6.50  )-----------( 126      ( 07 Oct 2018 15:01 )             25.2     07 Oct 2018 15:01    144    |  115    |  30     ----------------------------<  161    3.9     |  19     |  1.40     Ca    7.9        07 Oct 2018 15:01      PT/INR - ( 07 Oct 2018 15:01 )   PT: 29.9 sec;   INR: 2.69 ratio         PTT - ( 07 Oct 2018 15:01 )  PTT:35.3 sec  Urinalysis Basic - ( 06 Oct 2018 14:53 )    Color: Yellow / Appearance: Clear / S.015 / pH: x  Gluc: x / Ketone: Small  / Bili: Small / Urobili: Negative   Blood: x / Protein: 25 mg/dL / Nitrite: Negative   Leuk Esterase: Trace / RBC: 6-10 /HPF / WBC 0-2   Sq Epi: x / Non Sq Epi: Occasional / Bacteria: Occasional      CAPILLARY BLOOD GLUCOSE          RADIOLOGY & ADDITIONAL TESTS:    Notes Reviewed:  [x ] YES  [ ] NO    Care Discussed with Consultants/Other Providers [x ] YES  [ ] NO

## 2018-10-07 NOTE — CONSULT NOTE ADULT - PROBLEM SELECTOR RECOMMENDATION 9
in and out  bacid one tab tid  low residue lactose free diet  stool studies  to follow  if stool negative consider imodium
LRTI  poss PNA  ct scan reviewed  on emp ABX  s/p 2 dose of VANCO  will check MRSA screen  may need speech and swallow eval  HOB elev  I hermelinda  monitor vs and HD and Sat  keep sat > 88 pct

## 2018-10-07 NOTE — CHART NOTE - NSCHARTNOTEFT_GEN_A_CORE
Called by RN Called by RN for desaturation and crushing chest pain. Patient seen and examined. Patient states chest pain is located in the middle of his chest nonradiating. Pain has improved within minutes. Denies SOB, fever, chills, coughing.   Vital Signs Last 24 Hrs  T(C): 36.5 (07 Oct 2018 23:59), Max: 37.2 (07 Oct 2018 15:57)  T(F): 97.7 (07 Oct 2018 23:59), Max: 98.9 (07 Oct 2018 15:57)  HR: 79 (07 Oct 2018 23:59) (79 - 120)  BP: 114/69 (07 Oct 2018 23:59) (100/56 - 137/72)  RR: 20 (07 Oct 2018 23:59) (16 - 20)  SpO2: 97% (07 Oct 2018 23:59) (90% - 97%)  Physical Exam:  General: NAD, lying comfortably   HEENT: NCAT, dry mucous membranes   Neck: Supple, nontender, no mass  Neurology: A&Ox3  Respiratory: decreased bs b/l due to poor effort  CV: tachy, reg rhythm, +S1/S2, no murmurs, rubs or gallops, TTP below right breast, no rashes present  Abdominal: Soft, NT, ND +BSx4  Extremities: b/l LE pitting edema    A/P: 73 year old male hx of morbid obesity, bipolar disorder a LTC resident of SNF, bed bound with multiple wounds for over 2 years admitted with acute metabolic encephalopathy with mild overload and diarrhea, bibasilar pna, now complaining of reproducible chest pain and still tachycardic.   - EKG sinu tachy w/ pvcs @ 102bpm  - cardiac enzymes x1   - R/O PE - CTA   - pain improved - pain refused pain meds now  - morphine prn pain  - saturating 97% on O2  - Discussed plan with Dr. Tuttle Called by RN for desaturation and crushing chest pain. Patient seen and examined. Patient states chest pain is located in the middle of his chest nonradiating. Pain has improved within minutes. Denies SOB, fever, chills, coughing.   Vital Signs Last 24 Hrs  T(C): 36.5 (07 Oct 2018 23:59), Max: 37.2 (07 Oct 2018 15:57)  T(F): 97.7 (07 Oct 2018 23:59), Max: 98.9 (07 Oct 2018 15:57)  HR: 79 (07 Oct 2018 23:59) (79 - 120)  BP: 114/69 (07 Oct 2018 23:59) (100/56 - 137/72)  RR: 20 (07 Oct 2018 23:59) (16 - 20)  SpO2: 97% (07 Oct 2018 23:59) (90% - 97%)  Physical Exam:  General: NAD, lying comfortably   HEENT: NCAT, dry mucous membranes   Neck: Supple, nontender, no mass  Neurology: A&Ox3  Respiratory: decreased bs b/l due to poor effort  CV: tachy, reg rhythm, +S1/S2, no murmurs, rubs or gallops, TTP below right breast, no rashes present  Abdominal: Soft, NT, ND +BSx4  Extremities: b/l LE pitting edema    A/P: 73 year old male hx of morbid obesity, bipolar disorder a LTC resident of SNF, bed bound with multiple wounds for over 2 years admitted with acute metabolic encephalopathy with mild overload and diarrhea, bibasilar pna, now complaining of reproducible chest pain and still tachycardic.   - EKG sinu tachy w/ pvcs @ 102bpm similar to prior  - cardiac enzymes x1   - R/O PE - CTA   - pain improved - pain refused pain meds now  - morphine prn pain  - saturating 97% on O2  - Discussed plan with Dr. Tuttle Called by RN for desaturation and crushing chest pain. Patient seen and examined. Patient states chest pain is located in the middle of his chest nonradiating. Pain has improved within minutes. Denies SOB, fever, chills, coughing.   Vital Signs Last 24 Hrs  T(C): 36.5 (07 Oct 2018 23:59), Max: 37.2 (07 Oct 2018 15:57)  T(F): 97.7 (07 Oct 2018 23:59), Max: 98.9 (07 Oct 2018 15:57)  HR: 79 (07 Oct 2018 23:59) (79 - 120)  BP: 114/69 (07 Oct 2018 23:59) (100/56 - 137/72)  RR: 20 (07 Oct 2018 23:59) (16 - 20)  SpO2: 97% (07 Oct 2018 23:59) (90% - 97%)  Physical Exam:  General: NAD, lying comfortably   HEENT: NCAT, dry mucous membranes   Neck: Supple, nontender, no mass  Neurology: A&Ox3  Respiratory: decreased bs b/l due to poor effort  CV: tachy, reg rhythm, +S1/S2, no murmurs, rubs or gallops, TTP below right breast, no rashes present  Abdominal: Soft, NT, ND +BSx4  Extremities: b/l LE pitting edema    A/P: 73 year old male hx of morbid obesity, bipolar disorder a LTC resident of SNF, bed bound with multiple wounds for over 2 years admitted with acute metabolic encephalopathy with mild overload and diarrhea, bibasilar pna, now complaining of reproducible chest pain and still tachycardic.   - EKG sinu tachy w/ pvcs @ 102bpm similar to prior  - cardiac enzymes x1 negative. Trend cardiac enzymes.   - R/O PE - CTA   - pain improved - pain refused pain meds now  - morphine prn pain  - saturating 97% on O2  - Discussed plan with Dr. Tuttle Called by RN for desaturation and crushing chest pain. Patient seen and examined. Patient states chest pain is located in the middle of his chest nonradiating. Pain has improved within minutes. Denies SOB, fever, chills, coughing, abd pain.  Vital Signs Last 24 Hrs  T(C): 36.5 (07 Oct 2018 23:59), Max: 37.2 (07 Oct 2018 15:57)  T(F): 97.7 (07 Oct 2018 23:59), Max: 98.9 (07 Oct 2018 15:57)  HR: 79 (07 Oct 2018 23:59) (79 - 120)  BP: 114/69 (07 Oct 2018 23:59) (100/56 - 137/72)  RR: 20 (07 Oct 2018 23:59) (16 - 20)  SpO2: 97% (07 Oct 2018 23:59) (90% - 97%)  Physical Exam:  General: NAD, lying comfortably   HEENT: NCAT, dry mucous membranes   Neck: Supple, nontender, no mass  Neurology: A&Ox3  Respiratory: decreased bs b/l due to poor effort  CV: tachy, reg rhythm, +S1/S2, no murmurs, rubs or gallops, TTP below right breast, no rashes present  Abdominal: Soft, NT, ND +BSx4  Extremities: b/l LE pitting edema    A/P: 73 year old male hx of morbid obesity, bipolar disorder a LTC resident of SNF, bed bound with multiple wounds for over 2 years admitted with acute metabolic encephalopathy with mild overload and diarrhea, bibasilar pna, now complaining of reproducible chest pain and still tachycardic R/OP PE vs ACS vs MSK  - EKG sinus tachy w/ pvcs @ 102bpm similar to prior  - cardiac enzymes x1 negative. Trend cardiac enzymes.   - R/O PE - CTA   - pain improved - pain refused pain meds now  - morphine prn pain  - saturating 97% on O2  - Discussed plan with Dr. Tuttle Called by RN for desaturation and crushing chest pain. Patient seen and examined. Patient states chest pain is located in the middle of his chest nonradiating. Pain has improved within minutes. Denies SOB, fever, chills, coughing, abd pain.  Vital Signs Last 24 Hrs  T(C): 36.5 (07 Oct 2018 23:59), Max: 37.2 (07 Oct 2018 15:57)  T(F): 97.7 (07 Oct 2018 23:59), Max: 98.9 (07 Oct 2018 15:57)  HR: 79 (07 Oct 2018 23:59) (79 - 120)  BP: 114/69 (07 Oct 2018 23:59) (100/56 - 137/72)  RR: 20 (07 Oct 2018 23:59) (16 - 20)  SpO2: 97% (07 Oct 2018 23:59) (90% - 97%)  Physical Exam:  General: NAD, lying comfortably   HEENT: NCAT, dry mucous membranes   Neck: Supple, nontender, no mass  Neurology: A&Ox3  Respiratory: decreased bs b/l due to poor effort  CV: tachy, reg rhythm, +S1/S2, no murmurs, rubs or gallops, TTP below right breast, no rashes present  Abdominal: Soft, NT, ND +BSx4  Extremities: b/l LE pitting edema    A/P: 73 year old male hx of morbid obesity, bipolar disorder a LTC resident of SNF, bed bound with multiple wounds for over 2 years admitted with acute metabolic encephalopathy with mild overload and diarrhea, bibasilar pna, now complaining of reproducible chest pain and still tachycardic R/OP PE vs ACS vs MSK  - EKG sinus tachy w/ pvcs @ 102bpm similar to prior  - cardiac enzymes x1 negative. Trend cardiac enzymes.   - R/O PE - CTA   - pain improved - pain refused pain meds now  - morphine prn pain  - saturating 97% on O2  - Discussed plan with Dr. Tuttle    ** ADDENDUM** 10/8/18 @ 12:42  PATIENT REFUSING CTA at this time. Accepts risks of possible blood clot in lungs. Willing to have CTA later in am. Patient states he is currently asymptomatic and has no chest pain at this time. Called by RN for desaturation and crushing chest pain. Patient seen and examined. Patient states chest pain is located in the middle of his chest nonradiating. Pain has improved within minutes. Denies SOB, fever, chills, coughing, abd pain.  Vital Signs Last 24 Hrs  T(C): 36.5 (07 Oct 2018 23:59), Max: 37.2 (07 Oct 2018 15:57)  T(F): 97.7 (07 Oct 2018 23:59), Max: 98.9 (07 Oct 2018 15:57)  HR: 79 (07 Oct 2018 23:59) (79 - 120)  BP: 114/69 (07 Oct 2018 23:59) (100/56 - 137/72)  RR: 20 (07 Oct 2018 23:59) (16 - 20)  SpO2: 97% (07 Oct 2018 23:59) (90% - 97%)  Physical Exam:  General: NAD, lying comfortably   HEENT: NCAT, dry mucous membranes   Neck: Supple, nontender, no mass  Neurology: A&Ox3  Respiratory: decreased bs b/l due to poor effort  CV: tachy, reg rhythm, +S1/S2, no murmurs, rubs or gallops, TTP below right breast, no rashes present  Abdominal: Soft, NT, ND +BSx4  Extremities: b/l LE pitting edema    A/P: 73 year old male hx of morbid obesity, bipolar disorder a LTC resident of SNF, bed bound with multiple wounds for over 2 years admitted with acute metabolic encephalopathy with mild overload and diarrhea, bibasilar pna, now complaining of reproducible chest pain and still tachycardic R/OP PE vs ACS vs MSK  - EKG sinus tachy w/ pvcs @ 102bpm similar to prior  - cardiac enzymes x1 negative. Trend cardiac enzymes.   - R/O PE - CTA   - pain improved - pain refused pain meds now  - morphine prn pain  - saturating 97% on O2  - Discussed plan with Dr. Tuttle    ** ADDENDUM** 10/8/18 @ 00:42  PATIENT REFUSING CTA at this time. Accepts risks of possible blood clot in lungs. Willing to have CTA later in am. Patient states he is currently asymptomatic and has no chest pain at this time. Called by RN for desaturation and crushing chest pain. Patient seen and examined. Patient states chest pain is located in the middle of his chest nonradiating. Pain has improved within minutes. Denies SOB, fever, chills, coughing, abd pain.  Vital Signs Last 24 Hrs  T(C): 36.5 (07 Oct 2018 23:59), Max: 37.2 (07 Oct 2018 15:57)  T(F): 97.7 (07 Oct 2018 23:59), Max: 98.9 (07 Oct 2018 15:57)  HR: 79 (07 Oct 2018 23:59) (79 - 120)  BP: 114/69 (07 Oct 2018 23:59) (100/56 - 137/72)  RR: 20 (07 Oct 2018 23:59) (16 - 20)  SpO2: 97% (07 Oct 2018 23:59) (90% - 97%)  Physical Exam:  General: NAD, lying comfortably   HEENT: NCAT, dry mucous membranes   Neck: Supple, nontender, no mass  Neurology: A&Ox3  Respiratory: decreased bs b/l due to poor effort  CV: tachy, reg rhythm, +S1/S2, no murmurs, rubs or gallops, TTP below right breast, no rashes present  Abdominal: Soft, NT, ND +BSx4  Extremities: b/l LE pitting edema    A/P: 73 year old male hx of morbid obesity, bipolar disorder a LTC resident of SNF, bed bound with multiple wounds for over 2 years admitted with acute metabolic encephalopathy with mild overload and diarrhea, bibasilar pna, now complaining of reproducible chest pain and still tachycardic R/OP PE vs ACS vs MSK  - EKG sinus tachy w/ pvcs @ 102bpm similar to prior  - cardiac enzymes x1 negative. Trend cardiac enzymes.   - R/O PE - CTA   - pain improved - pain refused pain meds now  - morphine prn pain  - Pepcid  - saturating 97% on O2  - Discussed plan with Dr. Tuttle    ** ADDENDUM** 10/8/18 @ 00:42  PATIENT REFUSING CTA at this time. Accepts risks of possible blood clot in lungs. Willing to have CTA later in am. Patient states he is currently asymptomatic and has no chest pain at this time.

## 2018-10-07 NOTE — PROGRESS NOTE ADULT - PROBLEM SELECTOR PLAN 1
sepsis  acute infection  waxing and waning mental status  on emp ABX as per ID  oral hygiene  skin care  assist with ADL  monitor vs and HD and Sat  keep sat > 88 pct  pt does not want to use NIPPV for poss JEZ  serial labs  serial PE  will cont Albuterol and Spiriva  will follow

## 2018-10-08 NOTE — PROVIDER CONTACT NOTE (CHANGE IN STATUS NOTIFICATION) - ASSESSMENT
Patient is a 74 yo male BMI 60.7 combative behavior 5 person assist. Wounds not measured due to patients aggressiveness right buttock large DTPI about 30 x 30 draining  mirta blood bilateral groin moisture associated skin damage left anterior DTPI 2.5 x 3 abdomen multiple >10 round necrotic areas ranging from .5 x .5 to 4x4 intertriginous folds moisture associated skin damage cleaning patient and caring for the wounds is very difficult wounds were cleansed with NS, Cavilon skin protect was applied and abd secured with Tegaderm were applied to all open skin areas

## 2018-10-08 NOTE — SWALLOW BEDSIDE ASSESSMENT ADULT - COMMENTS
73 year old male admitted c SOB, abdominal pain, diarrhea, increased lethargy c hx of morbid obesity, bipolar disorder a LTC resident of SNF, DX- most likely has metabolic encephalopathy. ct head negative .   Pt c reduced mastication of solids 2 lack of dentition. Pt tolerated thin liquids c no overt si/sx of aspiration    He has multiple open wounds and bilateral unstageable DU on both buttocks , so that could be a source of infection.

## 2018-10-08 NOTE — SWALLOW BEDSIDE ASSESSMENT ADULT - SWALLOW EVAL: RECOMMENDED FEEDING/EATING TECHNIQUES
alternate food with liquid/maintain upright posture during/after eating for 30 mins/crush medication (when feasible)/check mouth frequently for oral residue/pocketing

## 2018-10-08 NOTE — PROGRESS NOTE ADULT - ASSESSMENT
Mr. Mills is a 73 year old male with HTN, and documented MI and congestive heart failure, here with altered mental status.  Unclear etiology of symptoms though Likely all metabolic in origin. He has been starving himself for the last 2 weeks and has had significant diarrhea  Labs notable for elevated BNP, with some mild overload on exam    Tachycardia  - No sign of acute ischemia. EKG is sinus tachycardia with APCs.   - Continue Tele ST with Sinus arrythmia  110-115 with PVCs  - tachy is likely reactive    DVT/PE  - Cont coumadin maintain goal INR 2-3.  INR today 2.28    Mild Volume Overload  - Check echocardiogram to define LV function  - BP is soft .   - does not appear to be in sig ADHF at this time.   - Hold off on diuresis at this time  - Received 4L IVF in 24 hours 10/5-10/6.  Lactate normalized. Watch volume status closely   - Watch creatinine and electrolytes. Keep K>4, Mg>2    DIOR   - Cr labile may have CKD  - Avoid nephrotoxic meds   - Renal following    PNA  - Abdominal CT with possible LL PNA.  - CXR poor study bronchovesicular markings in perihialr regions could be r/t vascular congestion  - Pulm following  - Abx as per ID for poss asp PNA    Anemia  - H.H still dropping  - will likely need another PRBC  - If so please give lasix 40mg IV x1 post PRBC.     - Will follow with you.

## 2018-10-08 NOTE — PROGRESS NOTE ADULT - SUBJECTIVE AND OBJECTIVE BOX
Patient is a 73y old  Male who presents with a chief complaint of not feeling well (08 Oct 2018 10:23)      Patient seen in follow up for CKD 3. Pt with poor PO intake. Pt's sister at bedside.     PAST MEDICAL HISTORY:  Myocardial infarct, old  Gastroesophageal reflux disease, esophagitis presence not specified  Other pulmonary embolism with acute cor pulmonale, unspecified chronicity  Acute congestive heart failure, unspecified heart failure type  Cellulitis, unspecified cellulitis site  Candida infection  Acute congestive heart failure, unspecified heart failure type  Rheumatoid arthritis with positive rheumatoid factor, involving unspecified site  Primary osteoarthritis, unspecified site  Chronic atrial fibrillation  Bipolar 1 disorder  Angina at rest  COPD mixed type  Essential hypertension  Morbid obesity    MEDICATIONS  (STANDING):  ALBUTerol    0.083% 2.5 milliGRAM(s) Nebulizer every 6 hours  aztreonam  IVPB 2000 milliGRAM(s) IV Intermittent every 8 hours  buDESOnide   0.5 milliGRAM(s) Respule 0.5 milliGRAM(s) Inhalation every 12 hours  cholestyramine Powder (Sugar-Free) 4 Gram(s) Oral daily  cyanocobalamin Injectable 1000 MICROGram(s) IntraMuscular daily  famotidine    Tablet 20 milliGRAM(s) Oral two times a day  folic acid 1 milliGRAM(s) Oral daily  lactobacillus acidophilus 1 Tablet(s) Oral three times a day with meals  metroNIDAZOLE    Tablet 500 milliGRAM(s) Oral every 8 hours  multivitamin 1 Tablet(s) Oral daily  nystatin Powder 1 Application(s) Topical two times a day  QUEtiapine 100 milliGRAM(s) Oral at bedtime  sodium bicarbonate 650 milliGRAM(s) Oral three times a day  tiotropium 18 MICROgram(s) Capsule 1 Capsule(s) Inhalation daily  venlafaxine XR. 150 milliGRAM(s) Oral daily    MEDICATIONS  (PRN):  morphine  - Injectable 2 milliGRAM(s) IV Push every 6 hours PRN pain/ distress  ondansetron Injectable 4 milliGRAM(s) IV Push every 6 hours PRN Nausea  promethazine Suppository 12.5 milliGRAM(s) Rectal four times a day PRN breakthrough nausea / vomiting  sodium chloride 0.65% Nasal 1 Spray(s) Both Nostrils four times a day PRN Nasal Congestion    T(C): 36.6 (10-08-18 @ 08:00), Max: 37.2 (10-07-18 @ 15:57)  HR: 88 (10-08-18 @ 08:00) (51 - 120)  BP: 100/57 (10-08-18 @ 08:00) (100/56 - 137/72)  RR: 19 (10-08-18 @ 08:00) (16 - 20)  SpO2: 92% (10-08-18 @ 08:00) (90% - 97%)  Wt(kg): --  I&O's Detail    07 Oct 2018 07:01  -  08 Oct 2018 07:00  --------------------------------------------------------  IN:    IV PiggyBack: 200 mL  Total IN: 200 mL    OUT:    Indwelling Catheter - Urethral: 850 mL  Total OUT: 850 mL    Total NET: -650 mL          PHYSICAL EXAM:  General: NAD  Respiratory: b/l air entry  Cardiovascular: S1 S2  Gastrointestinal: soft  Extremities:  + edema                          7.9    4.37  )-----------( 95       ( 08 Oct 2018 07:09 )             25.0     10-08    146<H>  |  117<H>  |  29<H>  ----------------------------<  148<H>  3.9   |  19<L>  |  1.30    Ca    7.7<L>      08 Oct 2018 07:09      CARDIAC MARKERS ( 08 Oct 2018 07:09 )  <.015 ng/mL / x     / 44 U/L / x     / 1.2 ng/mL  CARDIAC MARKERS ( 07 Oct 2018 23:53 )  <.015 ng/mL / x     / 59 U/L / x     / 1.2 ng/mL        Urinalysis Basic - ( 06 Oct 2018 14:53 )    Color: Yellow / Appearance: Clear / S.015 / pH: x  Gluc: x / Ketone: Small  / Bili: Small / Urobili: Negative   Blood: x / Protein: 25 mg/dL / Nitrite: Negative   Leuk Esterase: Trace / RBC: 6-10 /HPF / WBC 0-2   Sq Epi: x / Non Sq Epi: Occasional / Bacteria: Occasional        Sodium, Serum: 146 (10-08 @ 07:09)  Sodium, Serum: 144 (10-07 @ 15:01)  Sodium, Serum: 144 (10-06 @ 05:32)  Sodium, Serum: 140 (10-05 @ 12:53)    Creatinine, Serum: 1.30 (10-08 @ 07:09)  Creatinine, Serum: 1.40 (10-07 @ 15:01)  Creatinine, Serum: 1.40 (10-06 @ 05:32)  Creatinine, Serum: 1.60 (10-05 @ 12:53)    Potassium, Serum: 3.9 (10-08 @ 07:09)  Potassium, Serum: 3.9 (10-07 @ 15:01)  Potassium, Serum: 4.1 (10-06 @ 05:32)  Potassium, Serum: 4.7 (10-05 @ 12:53)    Hemoglobin: 7.9 (10-08 @ 07:09)  Hemoglobin: 8.2 (10-07 @ 15:01)  Hemoglobin: 8.3 (10-06 @ 16:16)  Hemoglobin: 7.5 (10-06 @ 05:32)

## 2018-10-08 NOTE — PROGRESS NOTE ADULT - SUBJECTIVE AND OBJECTIVE BOX
[facility] -- Madai Farah, Kalyan Gonzalez, Paul Wiley Savella  Office # [phone]      Follow Up:  CHF, AMS    Subjective/Observations: Patient seen and examined. Events noted. Resting comfortably in bed. Dyspnea improving.  No complaints of chest pain,  or palpitations reported.  per staff and sister, pt becoming very agitated. Apparently refused ct scan.       REVIEW OF SYSTEMS: All other review of systems is negative unless indicated above    PAST MEDICAL & SURGICAL HISTORY:  Myocardial infarct, old  Gastroesophageal reflux disease, esophagitis presence not specified  Other pulmonary embolism with acute cor pulmonale, unspecified chronicity  Acute congestive heart failure, unspecified heart failure type  Cellulitis, unspecified cellulitis site: abdomen buttocks  Candida infection  Acute congestive heart failure, unspecified heart failure type  Rheumatoid arthritis with positive rheumatoid factor, involving unspecified site  Primary osteoarthritis, unspecified site  Chronic atrial fibrillation  Bipolar 1 disorder  Angina at rest  COPD mixed type  Essential hypertension  Morbid obesity      MEDICATIONS  (STANDING):  ALBUTerol    0.083% 2.5 milliGRAM(s) Nebulizer every 6 hours  aztreonam  IVPB 2000 milliGRAM(s) IV Intermittent every 8 hours  buDESOnide   0.5 milliGRAM(s) Respule 0.5 milliGRAM(s) Inhalation every 12 hours  cholestyramine Powder (Sugar-Free) 4 Gram(s) Oral daily  cyanocobalamin Injectable 1000 MICROGram(s) IntraMuscular daily  famotidine    Tablet 20 milliGRAM(s) Oral two times a day  folic acid 1 milliGRAM(s) Oral daily  lactobacillus acidophilus 1 Tablet(s) Oral three times a day with meals  lactulose Syrup 10 Gram(s) Oral two times a day  metroNIDAZOLE    Tablet 500 milliGRAM(s) Oral every 8 hours  multivitamin 1 Tablet(s) Oral daily  nystatin Powder 1 Application(s) Topical two times a day  QUEtiapine 100 milliGRAM(s) Oral at bedtime  sodium bicarbonate 650 milliGRAM(s) Oral three times a day  tiotropium 18 MICROgram(s) Capsule 1 Capsule(s) Inhalation daily  venlafaxine XR. 150 milliGRAM(s) Oral daily    MEDICATIONS  (PRN):  morphine  - Injectable 2 milliGRAM(s) IV Push every 6 hours PRN pain/ distress  ondansetron Injectable 4 milliGRAM(s) IV Push every 6 hours PRN Nausea  promethazine Suppository 12.5 milliGRAM(s) Rectal four times a day PRN breakthrough nausea / vomiting  sodium chloride 0.65% Nasal 1 Spray(s) Both Nostrils four times a day PRN Nasal Congestion      Allergies    penicillin (Unknown)  strawberry (Unknown)    Intolerances            Vital Signs Last 24 Hrs  T(C): 36.6 (08 Oct 2018 13:00), Max: 37.2 (07 Oct 2018 15:57)  T(F): 97.8 (08 Oct 2018 13:00), Max: 98.9 (07 Oct 2018 15:57)  HR: 54 (08 Oct 2018 13:57) (51 - 120)  BP: 102/66 (08 Oct 2018 13:00) (100/57 - 114/69)  BP(mean): --  RR: 18 (08 Oct 2018 13:00) (16 - 20)  SpO2: 94% (08 Oct 2018 13:57) (90% - 97%)    I&O's Summary    07 Oct 2018 07:01  -  08 Oct 2018 07:00  --------------------------------------------------------  IN: 200 mL / OUT: 850 mL / NET: -650 mL          PHYSICAL EXAM:  [facility]: [facility]  Constitutional: NAD, awake   HEENT: Moist Mucous Membranes, Anicteric  Pulmonary: Decreased breath sounds b/l. No rales, crackles or wheeze appreciated.   Cardiovascular: Tachy, regular, S1 and S2, distant  Gastrointestinal: Bowel Sounds present, soft, nontender.   Lymph: 2+ peripheral edema. No lymphadenopathy.  Skin: bandages c/d/i  Psych:  agitated at times    LABS: All Labs Reviewed:                        7.9    4.37  )-----------( 95       ( 08 Oct 2018 07:09 )             25.0                         8.2    6.50  )-----------( 126      ( 07 Oct 2018 15:01 )             25.2                         8.3    x     )-----------( x        ( 06 Oct 2018 16:16 )             26.3     08 Oct 2018 07:09    146    |  117    |  29     ----------------------------<  148    3.9     |  19     |  1.30   07 Oct 2018 15:01    144    |  115    |  30     ----------------------------<  161    3.9     |  19     |  1.40   06 Oct 2018 05:32    144    |  115    |  35     ----------------------------<  140    4.1     |  17     |  1.40     Ca    7.7        08 Oct 2018 07:09  Ca    7.9        07 Oct 2018 15:01  Ca    7.0        06 Oct 2018 05:32  Mg     2.1       05 Oct 2018 20:05      PT/INR - ( 07 Oct 2018 15:01 )   PT: 29.9 sec;   INR: 2.69 ratio         PTT - ( 07 Oct 2018 15:01 )  PTT:35.3 sec  CARDIAC MARKERS ( 08 Oct 2018 07:09 )  <.015 ng/mL / x     / 44 U/L / x     / 1.2 ng/mL  CARDIAC MARKERS ( 07 Oct 2018 23:53 )  <.015 ng/mL / x     / 59 U/L / x     / 1.2 ng/mL

## 2018-10-08 NOTE — GOALS OF CARE CONVERSATION - PERSONAL ADVANCE DIRECTIVE - NS PRO AD PATIENT TYPE ON CHART
Health Care Proxy (HCP)/Medical Orders for Life-Sustaining Treatment (MOLST)/Do Not Resuscitate (DNR) molst completed 10/11/18/Health Care Proxy (HCP)/Medical Orders for Life-Sustaining Treatment (MOLST)/Do Not Resuscitate (DNR)

## 2018-10-08 NOTE — PROGRESS NOTE ADULT - PROBLEM SELECTOR PLAN 2
daily cbc   transfuse prn   hematology eval appreciated   ppi once a day  stool occult blood positive   may require EGD/colonoscopy on admission once medically clear/pending GOC

## 2018-10-08 NOTE — PROGRESS NOTE ADULT - SUBJECTIVE AND OBJECTIVE BOX
Neurology Follow up note    TIMOTEO PEDERSEN73yMale    HPI:  presented to er after patient was having many non specific complaints.    patient is poor historian.  per PMD, patient was being worked up diarhea for past few weeks.  In ER patient was found to have possible PNA on CT.  patient also has lactemia on labs.  Patient is being admitted for further work up and treatment. (05 Oct 2018 18:07)      Interval History - doing better    Patient is seen, chart was reviewed and case was discussed with the treatment team.  Pt is not in any distress.   Lying on bed comfortably.   No events reported overnight.   No clinical seizure was reported.  Sitting on chair bed comfortably.    is at bedside.    Vital Signs Last 24 Hrs  T(C): 36.6 (08 Oct 2018 08:00), Max: 37.2 (07 Oct 2018 15:57)  T(F): 97.8 (08 Oct 2018 08:00), Max: 98.9 (07 Oct 2018 15:57)  HR: 88 (08 Oct 2018 08:00) (51 - 120)  BP: 100/57 (08 Oct 2018 08:00) (100/56 - 114/69)  BP(mean): --  RR: 19 (08 Oct 2018 08:00) (16 - 20)  SpO2: 92% (08 Oct 2018 08:00) (90% - 97%)        REVIEW OF SYSTEMS:    limited , but not in any distress.    On Neurological Examination:    Mental Status - Pt is alert, awake, oriented X1 Follows commands and able to answer simple questions appropriately.    Speech -  dysarthria.    Cranial Nerves - Pupils 3 mm equal and reactive to light,  Pt has no asymmetry.     Muscle tone - is normal,    Motor Exam -3/5.    Sensory Exam - Pt withdraws all extremities equally on stimulation.    coordination:    Deep tendon Reflexes - 2 plus all over.       Neck Supple -  Yes.     MEDICATIONS    ALBUTerol    0.083% 2.5 milliGRAM(s) Nebulizer every 6 hours  aztreonam  IVPB 2000 milliGRAM(s) IV Intermittent every 8 hours  buDESOnide   0.5 milliGRAM(s) Respule 0.5 milliGRAM(s) Inhalation every 12 hours  cholestyramine Powder (Sugar-Free) 4 Gram(s) Oral daily  cyanocobalamin Injectable 1000 MICROGram(s) IntraMuscular daily  famotidine    Tablet 20 milliGRAM(s) Oral two times a day  folic acid 1 milliGRAM(s) Oral daily  lactobacillus acidophilus 1 Tablet(s) Oral three times a day with meals  metroNIDAZOLE    Tablet 500 milliGRAM(s) Oral every 8 hours  morphine  - Injectable 2 milliGRAM(s) IV Push every 6 hours PRN  multivitamin 1 Tablet(s) Oral daily  nystatin Powder 1 Application(s) Topical two times a day  ondansetron Injectable 4 milliGRAM(s) IV Push every 6 hours PRN  promethazine Suppository 12.5 milliGRAM(s) Rectal four times a day PRN  QUEtiapine 100 milliGRAM(s) Oral at bedtime  sodium bicarbonate 650 milliGRAM(s) Oral three times a day  sodium chloride 0.65% Nasal 1 Spray(s) Both Nostrils four times a day PRN  tiotropium 18 MICROgram(s) Capsule 1 Capsule(s) Inhalation daily  venlafaxine XR. 150 milliGRAM(s) Oral daily      Allergies    penicillin (Unknown)  strawberry (Unknown)    Intolerances        LABS:  CBC Full  -  ( 08 Oct 2018 07:09 )  WBC Count : 4.37 K/uL  Hemoglobin : 7.9 g/dL  Hematocrit : 25.0 %  Platelet Count - Automated : 95 K/uL  Mean Cell Volume : 90.3 fl  Mean Cell Hemoglobin : 28.5 pg  Mean Cell Hemoglobin Concentration : 31.6 gm/dL  Auto Neutrophil # : 3.67 K/uL  Auto Lymphocyte # : 0.47 K/uL  Auto Monocyte # : 0.11 K/uL  Auto Eosinophil # : 0.04 K/uL  Auto Basophil # : 0.00 K/uL  Auto Neutrophil % : 84.0 %  Auto Lymphocyte % : 10.8 %  Auto Monocyte % : 2.5 %  Auto Eosinophil % : 0.9 %  Auto Basophil % : 0.0 %    Urinalysis Basic - ( 06 Oct 2018 14:53 )    Color: Yellow / Appearance: Clear / S.015 / pH: x  Gluc: x / Ketone: Small  / Bili: Small / Urobili: Negative   Blood: x / Protein: 25 mg/dL / Nitrite: Negative   Leuk Esterase: Trace / RBC: 6-10 /HPF / WBC 0-2   Sq Epi: x / Non Sq Epi: Occasional / Bacteria: Occasional      10-    146<H>  |  117<H>  |  29<H>  ----------------------------<  148<H>  3.9   |  19<L>  |  1.30    Ca    7.7<L>      08 Oct 2018 07:09      Hemoglobin A1C:     Vitamin B12 Vitamin B12, Serum: 198 pg/mL (10-07 @ 18:58)      RADIOLOGY    < from: CT Head No Cont (10.06.18 @ 04:40) >    EXAM:  CT BRAIN                            PROCEDURE DATE:  10/06/2018          INTERPRETATION:  10/6/2018 5:10 AM    CLINICAL HISTORY:  Altered mental status.    TECHNIQUE: Axial CT images are obtained from the cranial vertex to the   skullbase without the administration of IV contrast.    COMPARISON: None    FINDINGS:    There is volume loss accounting for prominent ventricles. There is   atherosclerosis. White matter hypodensities noted compatible with   moderate chronic microvascular ischemic change in this age group. There   is no midline shift, mass effect, extra axial collection, or intracranial   hemorrhage.    The calvarium is intact. The visualized paranasal sinuses are aerated.   The mastoid air cells are clear.    IMPRESSION:    No acute hemorrhage or mass effect. Chronic changes noted                OVI FRANCO M.D., ATTENDING RADIOLOGIST  This document has been electronically signed. Oct  6 2018  5:14AM        ASSESSMENT AND PLAN:      UNRESPONSIVENESS /AMS  IMPROVING LIKELY MULTIFACTORIAL INCLUDING  HYPOTENSION/ HYPOXEMIA .  GI BLEED.  BIPOLAR DISORDER.    SEROQUEL 100 MG HS.  ANTIBIOTIC AS PER MEDICAL TEAM.  Physical therapy evaluation.  OOB to chair/ambulation with assistance only.  Pain is accessed and addressed.  Would continue to follow.

## 2018-10-08 NOTE — PROGRESS NOTE ADULT - ASSESSMENT
·	Prerenal azotemia, CKD 3  ·	Anemia  ·	Abd pain, Diarrhea    Improved creatinine trend. To continue current meds. Monitor h/h trend. Transfuse PRN.  GI and hematology follow up. Will follow electrolytes and renal function trend.

## 2018-10-08 NOTE — PROGRESS NOTE ADULT - PROBLEM SELECTOR PLAN 1
in and out  c diff negative   bacid one tab tid  low residue lactose free diet  stool studies  to follow  CT negative for abdominal pathology   if stool negative consider imodium.

## 2018-10-08 NOTE — PROGRESS NOTE ADULT - SUBJECTIVE AND OBJECTIVE BOX
Interval events: seen with sister at bedside. Pt still refusing to eat. no n/v.  + abdominal pain from wound under skin flap.    MEDICATIONS  (STANDING):  ALBUTerol    0.083% 2.5 milliGRAM(s) Nebulizer every 6 hours  aztreonam  IVPB 2000 milliGRAM(s) IV Intermittent every 8 hours  buDESOnide   0.5 milliGRAM(s) Respule 0.5 milliGRAM(s) Inhalation every 12 hours  cholestyramine Powder (Sugar-Free) 4 Gram(s) Oral daily  cyanocobalamin Injectable 1000 MICROGram(s) IntraMuscular daily  famotidine    Tablet 20 milliGRAM(s) Oral two times a day  folic acid 1 milliGRAM(s) Oral daily  lactobacillus acidophilus 1 Tablet(s) Oral three times a day with meals  metroNIDAZOLE    Tablet 500 milliGRAM(s) Oral every 8 hours  multivitamin 1 Tablet(s) Oral daily  nystatin Powder 1 Application(s) Topical two times a day  QUEtiapine 100 milliGRAM(s) Oral at bedtime  sodium bicarbonate 650 milliGRAM(s) Oral three times a day  tiotropium 18 MICROgram(s) Capsule 1 Capsule(s) Inhalation daily  venlafaxine XR. 150 milliGRAM(s) Oral daily    MEDICATIONS  (PRN):  morphine  - Injectable 2 milliGRAM(s) IV Push every 6 hours PRN pain/ distress  ondansetron Injectable 4 milliGRAM(s) IV Push every 6 hours PRN Nausea  promethazine Suppository 12.5 milliGRAM(s) Rectal four times a day PRN breakthrough nausea / vomiting  sodium chloride 0.65% Nasal 1 Spray(s) Both Nostrils four times a day PRN Nasal Congestion      Allergies    penicillin (Unknown)  strawberry (Unknown)    Intolerances        Review of Systems:    General:  No wt loss, fevers, chills, night sweats,fatigue,   Eyes:  Good vision, no reported pain  ENT:  No sore throat, pain, runny nose, dysphagia  CV:  No pain, palpitations, hypo/hypertension  Resp:  No dyspnea, cough, tachypnea, wheezing  GI:  No pain, No nausea, No vomiting, No diarrhea, No constipation, No weight loss, No fever, No pruritis, No rectal bleeding, No melena, No dysphagia  :  No pain, bleeding, incontinence, nocturia  Muscle:  No pain, weakness  Neuro:  No weakness, tingling, memory problems  Psych:  No fatigue, insomnia, mood problems, depression  Endocrine:  No polyuria, polydypsia, cold/heat intolerance  Heme:  No petechiae, ecchymosis, easy bruisability  Skin:  No rash, tattoos, scars, edema      Vital Signs Last 24 Hrs  T(C): 36.6 (08 Oct 2018 08:00), Max: 37.2 (07 Oct 2018 15:57)  T(F): 97.8 (08 Oct 2018 08:00), Max: 98.9 (07 Oct 2018 15:57)  HR: 88 (08 Oct 2018 08:00) (51 - 120)  BP: 100/57 (08 Oct 2018 08:00) (100/57 - 114/69)  BP(mean): --  RR: 19 (08 Oct 2018 08:00) (16 - 20)  SpO2: 92% (08 Oct 2018 08:00) (90% - 97%)    PHYSICAL EXAM:    Constitutional: NAD, well-developed  HEENT: EOMI, throat clear  Neck: No LAD, supple  Respiratory: CTA and P  Cardiovascular: S1 and S2, RRR, no M  Gastrointestinal: + obese, BS+, soft, NT/ND, neg HSM,  Extremities: No peripheral edema, neg clubing, cyanosis  Vascular: 2+ peripheral pulses  Neurological: A/O x 3, no focal deficits  Psychiatric: Normal mood, normal affect  Skin: No rashes      LABS:                        7.9    4.37  )-----------( 95       ( 08 Oct 2018 07:09 )             25.0     10-08    146<H>  |  117<H>  |  29<H>  ----------------------------<  148<H>  3.9   |  19<L>  |  1.30    Ca    7.7<L>      08 Oct 2018 07:09      PT/INR - ( 07 Oct 2018 15:01 )   PT: 29.9 sec;   INR: 2.69 ratio         PTT - ( 07 Oct 2018 15:01 )  PTT:35.3 sec  Urinalysis Basic - ( 06 Oct 2018 14:53 )    Color: Yellow / Appearance: Clear / S.015 / pH: x  Gluc: x / Ketone: Small  / Bili: Small / Urobili: Negative   Blood: x / Protein: 25 mg/dL / Nitrite: Negative   Leuk Esterase: Trace / RBC: 6-10 /HPF / WBC 0-2   Sq Epi: x / Non Sq Epi: Occasional / Bacteria: Occasional        RADIOLOGY & ADDITIONAL TESTS:

## 2018-10-08 NOTE — GOALS OF CARE CONVERSATION - PERSONAL ADVANCE DIRECTIVE - TREATMENT GUIDELINES
DNR Order/DNI DNR Order/Do not re-hospitalize/No artificial nutrition/No IV fluids/No blood draws/Comfort measures only/DNI/Antibiotic trial

## 2018-10-08 NOTE — PROGRESS NOTE ADULT - ASSESSMENT
74 y/o man w morbid obesity, bipolar disorder, decubitus ulcers, resident of [facility], bedbound for 2 years, being w/u for diarrhea last few weeks, stopped eating/drinking/taking meds for 6weeks, brought in to ER after being seen by his daughter with symptoms of increased lethargy/abdomen pain/diarrhea. Was seen in [facility] 9/30 for similar findings, CT c/a/p only sig for enlarged prostate.  CT head no acute findings, CT a/p w bibasilar pneumonia.  Seen by Neuro/Cardiology/ID/Pulm/Psych, started on antibiotics    noted to have progressive anemia with peripheral blood smear morphology no sig pathology  C diff negative but stool is guaiac positive  Nasal swab positive for MRSA    -anemia/precipitous drop in Hct from 11.3 on 9/30 to 9.3 on 10/5 to 7.5 on 10/6-element of GI blood loss w occult blood positive stool but no BRBPR and CT a/p no hematoma collection, no signs of hemolysis. Suspect due to acute illness, anorexia, ?sepsis, poor compensation during times of stress, with elements of baseline anemia due to chronic disease and renal insufficiency.  -post tx 1 u PRBC w appropriate increase of H/H  -noted profoundly depressed B12 and Folate level - started on po folate 1mg daily and B12 1000mcg IM X7 days; thereafter will need weekly X4 weeks and then monthly with close monitoring of B12 levels and CBCs  -?GI eval for stool positive for occult blood and epigaastric pain (outpatient Coumadin but w therapeutic INR)  -continue antibiotics for acute infectious processes  - thromobocytopenia likely consumptive with GI blood loss;   - will follow with you

## 2018-10-08 NOTE — PROVIDER CONTACT NOTE (CHANGE IN STATUS NOTIFICATION) - ACTION/TREATMENT ORDERED:
JUANITO CALDERON RN and JOSHUA cared for patient together and were educated in care of the patients skin

## 2018-10-08 NOTE — PROGRESS NOTE ADULT - SUBJECTIVE AND OBJECTIVE BOX
ID Progress note    Name: TIMOTEO PEDERSEN  Age: 73y  Gender: Male  MRN: [id number]    Interval History-- Events noted, very confused and hallucinating . Sister at bedside. had Grande placed for retention of urine . Complaints of pain due to decubiti, seen by wound care RN   Notes reviewed    Past Medical History--  Myocardial infarct, old  Gastroesophageal reflux disease, esophagitis presence not specified  Other pulmonary embolism with acute cor pulmonale, unspecified chronicity  Acute congestive heart failure, unspecified heart failure type  Cellulitis, unspecified cellulitis site  Candida infection  Acute congestive heart failure, unspecified heart failure type  Rheumatoid arthritis with positive rheumatoid factor, involving unspecified site  Primary osteoarthritis, unspecified site  Chronic atrial fibrillation  Bipolar 1 disorder  Angina at rest  COPD mixed type  Essential hypertension  Morbid obesity      For details regarding the patient's social history, family history, and other miscellaneous elements, please refer the initial infectious diseases consultation and/or the admitting history and physical examination for this admission.    Allergies--  Allergies    penicillin (Unknown)  strawberry (Unknown)    Intolerances        Medications--  Antibiotics:  aztreonam  IVPB 2000 milliGRAM(s) IV Intermittent every 8 hours  metroNIDAZOLE    Tablet 500 milliGRAM(s) Oral every 8 hours    Immunologic:    Other:  ALBUTerol    0.083%  buDESOnide   0.5 milliGRAM(s) Respule  cholestyramine Powder (Sugar-Free)  cyanocobalamin Injectable  famotidine    Tablet  folic acid  lactobacillus acidophilus  lactulose Syrup  morphine  - Injectable PRN  multivitamin  nystatin Powder  ondansetron Injectable PRN  promethazine Suppository PRN  QUEtiapine  sodium bicarbonate  sodium chloride 0.65% Nasal PRN  tiotropium 18 MICROgram(s) Capsule  venlafaxine XR.      Review of Systems--  Review of systems unable to be obtained secondary to clinical condition.    Physical Examination--    Vital Signs: T(F): 97.8 (10-08-18 @ 08:00), Max: 98.9 (10-07-18 @ 15:57)  HR: 88 (10-08-18 @ 08:00)  BP: 100/57 (10-08-18 @ 08:00)  RR: 19 (10-08-18 @ 08:00)  SpO2: 92% (10-08-18 @ 08:00)  Wt(kg): --  General: Morbidly obese Nontoxic-appearing Male in no acute distress.  HEENT: AT/NC. PERRL. EOMI. Anicteric. Conjunctiva pink and moist. Oropharynx clear. Dentition fair.  Neck: Not rigid. No sense of mass.  Nodes: None palpable.  Lungs: Clear bilaterally without rales, wheezing or rhonchi  Heart: Regular rate and rhythm. No Murmur. No rub. No gallop. No palpable thrill.  Abdomen: Bowel sounds present and normoactive. Soft. obese, large pannus . multiple superficial skin  wounds .  Back: No spinal tenderness. No costovertebral angle tenderness.   Extremities: No cyanosis or clubbing. No edema.   Skin: Warm. Dry. Good turgor. No rash. No vasculitic stigmata.  Psychiatric: Appropriate affect and mood for situation.         Laboratory Studies--  CBC                        7.9    4.37  )-----------( 95       ( 08 Oct 2018 07:09 )             25.0       Chemistries  10-08    146<H>  |  117<H>  |  29<H>  ----------------------------<  148<H>  3.9   |  19<L>  |  1.30    Ca    7.7<L>      08 Oct 2018 07:09      Procalcitonin, Serum (10.05.18 @ 20:05)    Procalcitonin, Serum: 0.81:     Recent Cultures:    Culture - Urine (collected 06 Oct 2018 20:52)  Source: .Urine Catheterized  Final Report (07 Oct 2018 23:12):    No growth    Culture - Blood (collected 05 Oct 2018 21:31)  Source: .Blood Blood  Preliminary Report (06 Oct 2018 22:01):    No growth to date.    Culture - Blood (collected 05 Oct 2018 21:31)  Source: .Blood Blood  Preliminary Report (06 Oct 2018 22:01):    No growth to date.      MRSA/MSSA PCR (10.06.18 @ 20:04)    MRSA PCR Result.: Not Detec:     Staph Aureus PCR Result: Detected    Radiology:   Xray Chest 1 View- PORTABLE-Routine (10.08.18 @ 08:54) >    There appears to be multifocal airspace opacities without focal   consolidation, probable mild congestive changes. There is no pleural   effusion or pneumothorax.  The cardiac silhouette is within normal   limits.  There is  degenerative changes. If symptoms persist, and   additional imaging evaluation is needed, follow-up CT is suggested.    Assessment--  73 year old male hx of morbid obesity, bipolar disorder a LTC resident of SN[facility], bed bound for over 2 years sent in to ER with complaints of increasing SOB, abdominal pains and diarrhea and increased lethargy , noted to be obtunded and poorly responsive, ABG does not show hypercapnia and ct head was negative . he is afebrile and has normal WBC so doubt its all from infection. he most likely has metabolic encephalopathy. he could have a CNS event although ct head negative . He has elevated P BNP but no evidence of CHF as per cardiology . He has delirium or metabolic encephalopathy . He could also have AMS secondary to his underlying psychiatric illness    He has multiple open wounds and bilateral unstageable DU on both buttocks , don't appears to be grossly infected      Doubt he has any active infection     Plan :   - will continue with  Azactam to 2 grams q 8 , change to po  Flagyl for possible aspiration Pneumonia .  - DC  Vancomycin as  MRSA screen is negative   - trend labs   - obtain labs from [facility]  - wound care evaluation   - psychiatry follow up   - consider stopping antibiotics in 24 hours    Continue with present regime .  Appropriate use of antibiotics and adverse effects reviewed.    I have discussed the above plan of care with patient and family in detail. They expressed understanding of the treatment plan . Risks, benefits and alternatives discussed in detail. I have asked if they have any questions or concerns and appropriately addressed them to the best of my ability .      > 35 minutes spent in direct patient care reviewing  the notes, lab data/ imaging , discussion with multidisciplinary team. All questions were addressed and answered to the best of my capacity .    Thank you for allowing me to participate in the care of your patient .        Derrek Corbin MD  [phone]

## 2018-10-08 NOTE — PROGRESS NOTE ADULT - SUBJECTIVE AND OBJECTIVE BOX
Patient seen and examined;  Chart reviewed and events noted;   had chest pain overnight with tachycardia; refused CT angio  continues to be lethargic  having diarrhea      MEDICATIONS  (STANDING):  ALBUTerol    0.083% 2.5 milliGRAM(s) Nebulizer every 6 hours  aztreonam  IVPB 2000 milliGRAM(s) IV Intermittent every 8 hours  buDESOnide   0.5 milliGRAM(s) Respule 0.5 milliGRAM(s) Inhalation every 12 hours  cholestyramine Powder (Sugar-Free) 4 Gram(s) Oral daily  famotidine    Tablet 20 milliGRAM(s) Oral two times a day  folic acid 1 milliGRAM(s) Oral daily  lactobacillus acidophilus 1 Tablet(s) Oral three times a day with meals  metroNIDAZOLE    Tablet 500 milliGRAM(s) Oral every 8 hours  multivitamin 1 Tablet(s) Oral daily  nystatin Powder 1 Application(s) Topical two times a day  QUEtiapine 100 milliGRAM(s) Oral at bedtime  sodium bicarbonate 650 milliGRAM(s) Oral three times a day  tiotropium 18 MICROgram(s) Capsule 1 Capsule(s) Inhalation daily  venlafaxine XR. 150 milliGRAM(s) Oral daily    MEDICATIONS  (PRN):  morphine  - Injectable 2 milliGRAM(s) IV Push every 6 hours PRN pain/ distress  ondansetron Injectable 4 milliGRAM(s) IV Push every 6 hours PRN Nausea  promethazine Suppository 12.5 milliGRAM(s) Rectal four times a day PRN breakthrough nausea / vomiting  sodium chloride 0.65% Nasal 1 Spray(s) Both Nostrils four times a day PRN Nasal Congestion      Vital Signs Last 24 Hrs  T(C): 36.6 (08 Oct 2018 08:00), Max: 37.2 (07 Oct 2018 15:57)  T(F): 97.8 (08 Oct 2018 08:00), Max: 98.9 (07 Oct 2018 15:57)  HR: 88 (08 Oct 2018 08:00) (51 - 120)  BP: 100/57 (08 Oct 2018 08:00) (100/56 - 114/69)  RR: 19 (08 Oct 2018 08:00) (16 - 20)  SpO2: 92% (08 Oct 2018 08:00) (90% - 97%)    PHYSICAL EXAM  General: adult obese male; resting in bed and in NAD  HEENT: clear oropharynx, anicteric sclera, pink conjunctivae; poor dentition  Neck: supple  CV: normal S1S2 with no murmur rubs or gallops  Lungs: reduced breath sounds at bases  Abdomen: obese, soft non-tender non-distended, no hepato/splenomegaly  Ext: chronic venous stasis changes    LABS:                        7.9    4.37  )-----------( 95       ( 08 Oct 2018 07:09 )             25.0     Hemoglobin: 7.9 g/dL (10-08 @ 07:09)  Hemoglobin: 8.2 g/dL (10-07 @ 15:01)  Hemoglobin: 8.3 g/dL (10-06 @ 16:16)  Hemoglobin: 7.5 g/dL (10-06 @ 05:32)  Hemoglobin: 9.3 g/dL (10-06 @ 00:22)    Platelet Count - Automated: 95 K/uL (10-08 @ 07:09)  Platelet Count - Automated: 126 K/uL (10-07 @ 15:01)  Platelet Count - Automated: 182 K/uL (10-06 @ 00:22)  Platelet Count - Automated: 212 K/uL (10-05 @ 12:53)    10-08    146<H>  |  117<H>  |  29<H>  ----------------------------<  148<H>  3.9   |  19<L>  |  1.30    Ca    7.7<L>      08 Oct 2018 07:09      PT/INR - ( 07 Oct 2018 15:01 )   PT: 29.9 sec;   INR: 2.69 ratio    PTT - ( 07 Oct 2018 15:01 )  PTT:35.3 sec    Occult Blood, Feces: Positive (10.05.18 @ 13:35)
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
## 2018-10-08 NOTE — PROGRESS NOTE ADULT - SUBJECTIVE AND OBJECTIVE BOX
Patient is a 73y old  Male who presents with a chief complaint of not feeling well (08 Oct 2018 14:17)      INTERVAL /OVERNIGHT EVENTS: alert awake , confused    MEDICATIONS  (STANDING):  ALBUTerol    0.083% 2.5 milliGRAM(s) Nebulizer every 6 hours  aztreonam  IVPB 2000 milliGRAM(s) IV Intermittent every 8 hours  buDESOnide   0.5 milliGRAM(s) Respule 0.5 milliGRAM(s) Inhalation every 12 hours  cholestyramine Powder (Sugar-Free) 4 Gram(s) Oral daily  cyanocobalamin Injectable 1000 MICROGram(s) IntraMuscular daily  famotidine    Tablet 20 milliGRAM(s) Oral two times a day  folic acid 1 milliGRAM(s) Oral daily  lactobacillus acidophilus 1 Tablet(s) Oral three times a day with meals  lactulose Syrup 10 Gram(s) Oral two times a day  metroNIDAZOLE    Tablet 500 milliGRAM(s) Oral every 8 hours  multivitamin 1 Tablet(s) Oral daily  nystatin Powder 1 Application(s) Topical two times a day  QUEtiapine 100 milliGRAM(s) Oral at bedtime  sodium bicarbonate 650 milliGRAM(s) Oral three times a day  tiotropium 18 MICROgram(s) Capsule 1 Capsule(s) Inhalation daily  venlafaxine XR. 150 milliGRAM(s) Oral daily    MEDICATIONS  (PRN):  morphine  - Injectable 2 milliGRAM(s) IV Push every 6 hours PRN pain/ distress  ondansetron Injectable 4 milliGRAM(s) IV Push every 6 hours PRN Nausea  promethazine Suppository 12.5 milliGRAM(s) Rectal four times a day PRN breakthrough nausea / vomiting  sodium chloride 0.65% Nasal 1 Spray(s) Both Nostrils four times a day PRN Nasal Congestion      Allergies    penicillin (Unknown)  strawberry (Unknown)    Intolerances        REVIEW OF SYSTEMS: unable to obtain    Vital Signs Last 24 Hrs  T(C): 36.6 (08 Oct 2018 19:59), Max: 36.6 (08 Oct 2018 08:00)  T(F): 97.8 (08 Oct 2018 19:59), Max: 97.8 (08 Oct 2018 08:00)  HR: 65 (08 Oct 2018 20:44) (51 - 99)  BP: 105/70 (08 Oct 2018 19:59) (98/64 - 114/69)  BP(mean): --  RR: 19 (08 Oct 2018 19:59) (18 - 20)  SpO2: 95% (08 Oct 2018 20:44) (92% - 97%)    PHYSICAL EXAM:  GENERAL: NAD, well-groomed, well-developed  HEAD:  Atraumatic, Normocephalic  EYES: EOMI, PERRLA, conjunctiva and sclera clear  ENMT: No tonsillar erythema, exudates, or enlargement; Moist mucous membranes, Good dentition, No lesions  NECK: Supple, No JVD, Normal thyroid  NERVOUS SYSTEM:  Alert & awake, Motor Strength 5/5 B/L upper and lower extremities; DTRs 2+ intact and symmetric  CHEST/LUNG: Clear to auscultation bilaterally; No rales, rhonchi, wheezing, or rubs  HEART: Regular rate and rhythm; No murmurs, rubs, or gallops  ABDOMEN: Soft, Nontender, Nondistended; Bowel sounds present  EXTREMITIES:  2+ Peripheral Pulses, No clubbing, cyanosis, or edema  LYMPH: No lymphadenopathy noted  SKIN: No rashes or lesions, multiple wounds    LABS:                        7.9    4.37  )-----------( 95       ( 08 Oct 2018 07:09 )             25.0     08 Oct 2018 07:09    146    |  117    |  29     ----------------------------<  148    3.9     |  19     |  1.30     Ca    7.7        08 Oct 2018 07:09      PT/INR - ( 07 Oct 2018 15:01 )   PT: 29.9 sec;   INR: 2.69 ratio         PTT - ( 07 Oct 2018 15:01 )  PTT:35.3 sec    CAPILLARY BLOOD GLUCOSE          RADIOLOGY & ADDITIONAL TESTS:    Notes Reviewed:  [x ] YES  [ ] NO    Care Discussed with Consultants/Other Providers [x ] YES  [ ] NO

## 2018-10-08 NOTE — SWALLOW BEDSIDE ASSESSMENT ADULT - ORAL PREPARATORY PHASE
Decreased mastication ability/Within functional limits/Reduced oral grading Reduced oral grading/Decreased mastication ability

## 2018-10-09 NOTE — PROGRESS NOTE ADULT - PROBLEM SELECTOR PLAN 2
am labs pending  prior hgb trend noted  no evidence of active gib per nursing  prior fobt+  daily cbc, transfuse prn  cont pepcid  heme/onc recs appreciated, anemia multifactorial  hold ac asa nsaids  may require EGD/colonoscopy once medically optimized if in line w GOC

## 2018-10-09 NOTE — PROGRESS NOTE ADULT - ASSESSMENT
·	Prerenal azotemia, CKD 3  ·	Anemia  ·	Abd pain, Diarrhea    Improved creatinine trend. To continue current meds. Monitor h/h trend. Transfuse PRN.  GI and hematology follow up. Will follow electrolytes and renal function trend.   will stop the IVF for  now. Will follow.

## 2018-10-09 NOTE — PROGRESS NOTE ADULT - SUBJECTIVE AND OBJECTIVE BOX
[facility]    Madai Farah, Lisa, Kalyan, Inez, Paul, Leigha      [phone]    CHIEF COMPLAINT: Patient is a 73y old  Male who presents with a chief complaint of not feeling well (09 Oct 2018 11:17)      Follow Up: htn, cad, hf, altered ms    Interim history: unable to provide a hx, events noted    MEDICATIONS  (STANDING):  ALBUTerol    0.083% 2.5 milliGRAM(s) Nebulizer every 6 hours  buDESOnide   0.5 milliGRAM(s) Respule 0.5 milliGRAM(s) Inhalation every 12 hours  cholestyramine Powder (Sugar-Free) 4 Gram(s) Oral daily  cyanocobalamin Injectable 1000 MICROGram(s) IntraMuscular daily  famotidine    Tablet 20 milliGRAM(s) Oral two times a day  folic acid 1 milliGRAM(s) Oral daily  lactobacillus acidophilus 1 Tablet(s) Oral three times a day with meals  multivitamin 1 Tablet(s) Oral daily  nystatin Powder 1 Application(s) Topical two times a day  QUEtiapine 100 milliGRAM(s) Oral daily  QUEtiapine 100 milliGRAM(s) Oral at bedtime  sodium bicarbonate 650 milliGRAM(s) Oral three times a day  sodium chloride 0.45%. 1000 milliLiter(s) (100 mL/Hr) IV Continuous <Continuous>  tiotropium 18 MICROgram(s) Capsule 1 Capsule(s) Inhalation daily  venlafaxine XR. 150 milliGRAM(s) Oral daily    MEDICATIONS  (PRN):  morphine  - Injectable 2 milliGRAM(s) IV Push every 6 hours PRN pain/ distress  OLANZapine Injectable 10 milliGRAM(s) IntraMuscular every 6 hours PRN Acute agitation  ondansetron Injectable 4 milliGRAM(s) IV Push every 6 hours PRN Nausea  promethazine Suppository 12.5 milliGRAM(s) Rectal four times a day PRN breakthrough nausea / vomiting  sodium chloride 0.65% Nasal 1 Spray(s) Both Nostrils four times a day PRN Nasal Congestion      REVIEW OF SYSTEMS: confused, unable    Vital Signs Last 24 Hrs  T(C): 36.4 (09 Oct 2018 08:00), Max: 36.6 (08 Oct 2018 19:59)  T(F): 97.5 (09 Oct 2018 08:00), Max: 97.8 (08 Oct 2018 19:59)  HR: 95 (09 Oct 2018 08:00) (54 - 95)  BP: 103/62 (09 Oct 2018 08:00) (98/64 - 105/70)  BP(mean): --  RR: 18 (09 Oct 2018 08:00) (18 - 19)  SpO2: 98% (09 Oct 2018 08:00) (89% - 98%)    I&O's Summary    08 Oct 2018 07:01  -  09 Oct 2018 07:00  --------------------------------------------------------  IN: 700 mL / OUT: 500 mL / NET: 200 mL        Telemetry past 24h: sr/st, brief svt    PHYSICAL EXAM:    Constitutional: obese, unresp  HEENT:  MMM, sclerae anicteric, conjunctivae clear, no oral cyanosis.  Pulmonary: Non-labored, breath sounds are clear ant , No wheezing, rales or rhonchi  Cardiovascular: Regular, S1 and S2.  No murmur.  No rubs, gallops or clicks  Gastrointestinal: Bowel Sounds present, soft, nontender.   Lymph: mildperipheral edema.   Neurological: leth  Skin: No rashes.  Psych:  leth    LABS: All Labs Reviewed:                        7.9    4.37  )-----------( 95       ( 08 Oct 2018 07:09 )             25.0                         8.2    6.50  )-----------( 126      ( 07 Oct 2018 15:01 )             25.2                         8.3    x     )-----------( x        ( 06 Oct 2018 16:16 )             26.3     08 Oct 2018 07:09    146    |  117    |  29     ----------------------------<  148    3.9     |  19     |  1.30   07 Oct 2018 15:01    144    |  115    |  30     ----------------------------<  161    3.9     |  19     |  1.40     Ca    7.7        08 Oct 2018 07:09  Ca    7.9        07 Oct 2018 15:01      PT/INR - ( 07 Oct 2018 15:01 )   PT: 29.9 sec;   INR: 2.69 ratio         PTT - ( 07 Oct 2018 15:01 )  PTT:35.3 sec  CARDIAC MARKERS ( 08 Oct 2018 07:09 )  <.015 ng/mL / x     / 44 U/L / x     / 1.2 ng/mL  CARDIAC MARKERS ( 07 Oct 2018 23:53 )  <.015 ng/mL / x     / 59 U/L / x     / 1.2 ng/mL      Blood Culture: Organism --  Gram Stain Blood -- Gram Stain --  Specimen Source .Urine Catheterized  Culture-Blood --    Organism --  Gram Stain Blood -- Gram Stain --  Specimen Source .Blood Blood  Culture-Blood --            RADIOLOGY:    EKG:    Echo:

## 2018-10-09 NOTE — PROGRESS NOTE ADULT - SUBJECTIVE AND OBJECTIVE BOX
ID Progress note     Name: TIMOTEO PEDERSEN  Age: 73y  Gender: Male  MRN: [id number]    Interval History-- Events noted , remains confused and delirious , hallucinating claims he was trying to get out of bed and he could not . being seen by psychiatry . Afebrile . been refusing to eat .Seen by wound care RN . has curtis catheter with very dark urine , UOP only 500 cc in last 24 hours   Notes reviewed    Past Medical History--  Myocardial infarct, old  Gastroesophageal reflux disease, esophagitis presence not specified  Other pulmonary embolism with acute cor pulmonale, unspecified chronicity  Acute congestive heart failure, unspecified heart failure type  Cellulitis, unspecified cellulitis site  Candida infection  Acute congestive heart failure, unspecified heart failure type  Rheumatoid arthritis with positive rheumatoid factor, involving unspecified site  Primary osteoarthritis, unspecified site  Chronic atrial fibrillation  Bipolar 1 disorder  Angina at rest  COPD mixed type  Essential hypertension  Morbid obesity      For details regarding the patient's social history, family history, and other miscellaneous elements, please refer the initial infectious diseases consultation and/or the admitting history and physical examination for this admission.    Allergies--  Allergies    penicillin (Unknown)  strawberry (Unknown)    Intolerances        Medications--  Antibiotics:  aztreonam  IVPB 2000 milliGRAM(s) IV Intermittent every 8 hours  metroNIDAZOLE    Tablet 500 milliGRAM(s) Oral every 8 hours    Immunologic:    Other:  ALBUTerol    0.083%  buDESOnide   0.5 milliGRAM(s) Respule  cholestyramine Powder (Sugar-Free)  cyanocobalamin Injectable  famotidine    Tablet  folic acid  lactobacillus acidophilus  lactulose Syrup  morphine  - Injectable PRN  multivitamin  nystatin Powder  ondansetron Injectable PRN  promethazine Suppository PRN  QUEtiapine  sodium bicarbonate  sodium chloride 0.65% Nasal PRN  tiotropium 18 MICROgram(s) Capsule  venlafaxine XR.      Review of Systems--  Review of systems unable to be obtained secondary to clinical condition.    Physical Examination--    Vital Signs: T(F): 97.5 (10-09-18 @ 08:00), Max: 97.8 (10-08-18 @ 13:00)  HR: 95 (10-09-18 @ 08:00)  BP: 103/62 (10-09-18 @ 08:00)  RR: 18 (10-09-18 @ 08:00)  SpO2: 98% (10-09-18 @ 08:00)      Wt(kg): --  General: Morbidly obese Nontoxic-appearing Male in no acute distress.  HEENT: AT/NC. PERRL. EOMI. Anicteric. Conjunctiva pink and moist. Oropharynx clear. Dentition fair.  Neck: Not rigid. No sense of mass.  Nodes: None palpable.  Lungs: Clear bilaterally without rales, wheezing or rhonchi  Heart: Regular rate and rhythm. No Murmur. No rub. No gallop. No palpable thrill.  Abdomen: Bowel sounds present and normoactive. Soft. obese, large pannus . multiple superficial skin  wounds .  Back: No spinal tenderness. No costovertebral angle tenderness.   Extremities: No cyanosis or clubbing. No edema.   Skin: Warm. Dry.  on buttocks DTI 30 cm x 30 cm , has superficial maceration in the skin folds   Psychiatric: Appropriate affect and mood for situation.     Laboratory Studies--  CBC                        7.9    4.37  )-----------( 95       ( 08 Oct 2018 07:09 )             25.0       Chemistries  10-08    146<H>  |  117<H>  |  29<H>  ----------------------------<  148<H>  3.9   |  19<L>  |  1.30    Ca    7.7<L>      08 Oct 2018 07:09        Culture Data    Culture - Urine (collected 06 Oct 2018 20:52)  Source: .Urine Catheterized  Final Report (07 Oct 2018 23:12):    No growth    Culture - Blood (collected 05 Oct 2018 21:31)  Source: .Blood Blood  Preliminary Report (06 Oct 2018 22:01):    No growth to date.    Culture - Blood (collected 05 Oct 2018 21:31)  Source: .Blood Blood  Preliminary Report (06 Oct 2018 22:01):    No growth to date.    Radiology:  Xray Chest 1 View- PORTABLE-Routine (10.08.18 @ 08:54) >  IMPRESSION:       There appears to be multifocal airspace opacities without focal   consolidation, probable mild congestive changes. There is no pleural   effusion or pneumothorax.  The cardiac silhouette is within normal   limits.  There is  degenerative changes. If symptoms persist, and   additional imaging evaluation is needed, follow-up CT is suggested.      Assessment--  73 year old male hx of morbid obesity, bipolar disorder a LTC resident of SNF, bed bound for over 2 years sent in to ER with complaints of increasing SOB, abdominal pains and diarrhea and increased lethargy , noted to be obtunded and poorly responsive, ABG does not show hypercapnia and ct head was negative . he is afebrile and has normal WBC so doubt its all from infection. he most likely has metabolic encephalopathy. he could have a CNS event although ct head negative . He has elevated P BNP but no evidence of CHF as per cardiology . He has delirium or metabolic encephalopathy . He could also have AMS secondary to his underlying psychiatric illness    He has multiple open wounds and bilateral unstageable DU on both buttocks , don't appears to be grossly infected      Doubt he has any active infection     He appears volume depleted     Plan :   - will DC antibiotics as no evidence of active infection   - trend labs   - wound care as per wound care RN , need to change position frequently   - psychiatry follow up   - consider starting IVF   - may need appetite stimulants     Continue with present regime .  Appropriate use of antibiotics and adverse effects reviewed.      I have discussed the above plan of care with patient's family in detail. They expressed understanding of the treatment plan . Risks, benefits and alternatives discussed in detail. I have asked if they have any questions or concerns and appropriately addressed them to the best of my ability .      > 35 minutes spent in direct patient care reviewing  the notes, lab data/ imaging , discussion with multidisciplinary team. All questions were addressed and answered to the best of my capacity .    Thank you for allowing me to participate in the care of your patient .        Derrek Corbin MD  [phone]

## 2018-10-09 NOTE — PROGRESS NOTE ADULT - SUBJECTIVE AND OBJECTIVE BOX
HPI: Patient is a 72y/o Male admitted to med/surg with following history:  presented to er after patient was having many non specific complaints.    patient is poor historian.  per PMD, patient was being worked up diarhea for past few weeks.  In ER patient was found to have possible PNA on CT.  patient also has lactemia on labs.  Patient is being admitted for further work up and treatment. (05 Oct 2018 18:07)      Psych HPI: Patient seen, evaluated and chart reviewed. Spoke to staff, left message for the sister. Patient appears to be lucid today, although continues to be confused and is unable to provided consistent history. As per chart patient carries diagnosis of bipolar disorder, but unclear if he was hospitalized for it in the past. He is currently on Effexor  mg po daily and Seroquel 100 mg po at HS. As per staff patient gets agitated intermittently is difficult to redirect.    PAST MEDICAL & SURGICAL HISTORY:  Myocardial infarct, old  Gastroesophageal reflux disease, esophagitis presence not specified  Other pulmonary embolism with acute cor pulmonale, unspecified chronicity  Acute congestive heart failure, unspecified heart failure type  Cellulitis, unspecified cellulitis site: abdomen buttocks  Candida infection  Acute congestive heart failure, unspecified heart failure type  Rheumatoid arthritis with positive rheumatoid factor, involving unspecified site  Primary osteoarthritis, unspecified site  Chronic atrial fibrillation  Bipolar 1 disorder  Angina at rest  COPD mixed type  Essential hypertension  Morbid obesity      Allergies    penicillin (Unknown)  strawberry (Unknown)    Intolerances      MEDICATIONS  (STANDING):  ALBUTerol    0.083% 2.5 milliGRAM(s) Nebulizer every 6 hours  buDESOnide   0.5 milliGRAM(s) Respule 0.5 milliGRAM(s) Inhalation every 12 hours  cholestyramine Powder (Sugar-Free) 4 Gram(s) Oral daily  cyanocobalamin Injectable 1000 MICROGram(s) IntraMuscular daily  famotidine    Tablet 20 milliGRAM(s) Oral two times a day  folic acid 1 milliGRAM(s) Oral daily  lactobacillus acidophilus 1 Tablet(s) Oral three times a day with meals  lactulose Syrup 10 Gram(s) Oral two times a day  multivitamin 1 Tablet(s) Oral daily  nystatin Powder 1 Application(s) Topical two times a day  QUEtiapine 100 milliGRAM(s) Oral at bedtime  sodium bicarbonate 650 milliGRAM(s) Oral three times a day  sodium chloride 0.45%. 1000 milliLiter(s) (100 mL/Hr) IV Continuous <Continuous>  tiotropium 18 MICROgram(s) Capsule 1 Capsule(s) Inhalation daily  venlafaxine XR. 150 milliGRAM(s) Oral daily    MEDICATIONS  (PRN):  morphine  - Injectable 2 milliGRAM(s) IV Push every 6 hours PRN pain/ distress  ondansetron Injectable 4 milliGRAM(s) IV Push every 6 hours PRN Nausea  promethazine Suppository 12.5 milliGRAM(s) Rectal four times a day PRN breakthrough nausea / vomiting  sodium chloride 0.65% Nasal 1 Spray(s) Both Nostrils four times a day PRN Nasal Congestion        ROS: Psych: See HPI.  All other systems negative.                          7.9    4.37  )-----------( 95       ( 08 Oct 2018 07:09 )             25.0   08 Oct 2018 07:09    146    |  117    |  29     ----------------------------<  148    3.9     |  19     |  1.30     Ca    7.7        08 Oct 2018 07:09      TSH:     Utox:  Imaging:  Other Tests:    Old Records reviewed:    EXAM:  Vital Signs Last 24 Hrs  T(C): 36.4 (10-09-18 @ 08:00), Max: 36.6 (10-08-18 @ 13:00)  T(F): 97.5 (10-09-18 @ 08:00), Max: 97.8 (10-08-18 @ 13:00)  HR: 95 (10-09-18 @ 08:00) (54 - 99)  BP: 103/62 (10-09-18 @ 08:00) (98/64 - 105/70)  BP(mean): --  RR: 18 (10-09-18 @ 08:00) (18 - 19)  SpO2: 98% (10-09-18 @ 08:00) (89% - 98%)  Gen Appearance: Poor hygiene, morbidly obese  Gait/Station/Muscle Tone: Impaired  Abnl Movements: Absent  Speech: Delayed  TP; WNL  Associations: WNL  TC: Patient is confused  Mood: Fair  Affect: Constricted  Consciousness/orientation: Impaired  Memory:   Recent: Impaired    Remote: Impaired  Attention/Concentration: Impaired  Language: WNL  Fund of Knowledge: Impaired  Insight: Poor  Judgment: poor    DX: Bipolar disorder vs delirium vs dementia    REC: Zyprexa 10 mg IM q 6 hourly prn - acute agitation

## 2018-10-09 NOTE — PROGRESS NOTE ADULT - ASSESSMENT
73 year old male with HTN, and documented MI and congestive heart failure, here with altered mental status.  Unclear etiology of symptoms though Likely all metabolic in origin. He has been starving himself for the last 2 weeks and has had significant diarrhea  Labs notable for elevated BNP, with some mild overload on exam       - No sign of acute ischemia. EKG is sinus tachycardia with APCs.   - Continue Tele ST    - tachy is likely reactive    - DVT/PE  - Cont coumadin maintain goal INR 2-3     -mild Volume Overload  - await echocardiogram to define LV function  - does not appear to be in sig ADHF at this time.   - Hold off on diuresis at this time   - Watch creatinine and electrolytes. Keep K>4, Mg>2     - H.H still dropping  - will likely need another PRBC  - If so please give lasix 40mg IV x1 post PRBC.     -mental status remains quite poor, though felt to be improving    - Will follow

## 2018-10-09 NOTE — PROGRESS NOTE ADULT - SUBJECTIVE AND OBJECTIVE BOX
NUVIA TIMOTEO  73y  Male    Patient is a 73y old  Male who presents with a chief complaint of not feeling well (09 Oct 2018 11:17)    sleeping on rounds.       PAST MEDICAL & SURGICAL HISTORY:  Myocardial infarct, old  Gastroesophageal reflux disease, esophagitis presence not specified  Other pulmonary embolism with acute cor pulmonale, unspecified chronicity  Acute congestive heart failure, unspecified heart failure type  Cellulitis, unspecified cellulitis site: abdomen buttocks  Candida infection  Acute congestive heart failure, unspecified heart failure type  Rheumatoid arthritis with positive rheumatoid factor, involving unspecified site  Primary osteoarthritis, unspecified site  Chronic atrial fibrillation  Bipolar 1 disorder  Angina at rest  COPD mixed type  Essential hypertension  Morbid obesity      PHYSICAL EXAM:    T(C): 36.9 (10-09-18 @ 12:00), Max: 36.9 (10-09-18 @ 12:00)  HR: 61 (10-09-18 @ 14:16) (56 - 95)  BP: 100/61 (10-09-18 @ 12:00) (98/64 - 105/70)  RR: 22 (10-09-18 @ 12:00) (18 - 22)  SpO2: 99% (10-09-18 @ 14:16) (89% - 99%)  Wt(kg): --    I&O's Detail    08 Oct 2018 07:  -  09 Oct 2018 07:00  --------------------------------------------------------  IN:    Oral Fluid: 400 mL    Solution: 300 mL  Total IN: 700 mL    OUT:    Indwelling Catheter - Urethral: 500 mL  Total OUT: 500 mL    Total NET: 200 mL      09 Oct 2018 07:  -  09 Oct 2018 15:36  --------------------------------------------------------  IN:  Total IN: 0 mL    OUT:    Indwelling Catheter - Urethral: 400 mL  Total OUT: 400 mL    Total NET: -400 mL    Respiratory: clear anteriorly, decreased BS at bases  Cardiovascular: S1 S2  Gastrointestinal: soft NT ND +BS  Extremities: one plus edema   Neuro: Awake and alert    MEDICATIONS  (STANDING):  ALBUTerol    0.083% 2.5 milliGRAM(s) Nebulizer every 6 hours  buDESOnide   0.5 milliGRAM(s) Respule 0.5 milliGRAM(s) Inhalation every 12 hours  cholestyramine Powder (Sugar-Free) 4 Gram(s) Oral daily  cyanocobalamin Injectable 1000 MICROGram(s) IntraMuscular daily  famotidine    Tablet 20 milliGRAM(s) Oral two times a day  folic acid 1 milliGRAM(s) Oral daily  lactobacillus acidophilus 1 Tablet(s) Oral three times a day with meals  multivitamin 1 Tablet(s) Oral daily  nystatin Powder 1 Application(s) Topical two times a day  QUEtiapine 100 milliGRAM(s) Oral daily  QUEtiapine 100 milliGRAM(s) Oral at bedtime  sodium bicarbonate 650 milliGRAM(s) Oral three times a day  tiotropium 18 MICROgram(s) Capsule 1 Capsule(s) Inhalation daily  venlafaxine XR. 150 milliGRAM(s) Oral daily    MEDICATIONS  (PRN):  morphine  - Injectable 2 milliGRAM(s) IV Push every 6 hours PRN pain/ distress  OLANZapine Injectable 10 milliGRAM(s) IntraMuscular every 6 hours PRN Acute agitation  ondansetron Injectable 4 milliGRAM(s) IV Push every 6 hours PRN Nausea  promethazine Suppository 12.5 milliGRAM(s) Rectal four times a day PRN breakthrough nausea / vomiting  sodium chloride 0.65% Nasal 1 Spray(s) Both Nostrils four times a day PRN Nasal Congestion                            7.9    4.37  )-----------( 95       ( 08 Oct 2018 07:09 )             25.0       10-08    146<H>  |  117<H>  |  29<H>  ----------------------------<  148<H>  3.9   |  19<L>  |  1.30    Ca    7.7<L>      08 Oct 2018 07:09        Urinalysis Basic - ( 09 Oct 2018 08:24 )    Color: Yellow / Appearance: Slightly Turbid / S.010 / pH: x  Gluc: x / Ketone: Trace  / Bili: Small / Urobili: Negative   Blood: x / Protein: 75 mg/dL / Nitrite: Negative   Leuk Esterase: Small / RBC: 25-50 /HPF / WBC 3-5   Sq Epi: x / Non Sq Epi: Occasional / Bacteria: Few

## 2018-10-09 NOTE — PROGRESS NOTE ADULT - SUBJECTIVE AND OBJECTIVE BOX
Neurology Follow up note    TIMOTEO PEDERSEN73yMale    HPI:  presented to er after patient was having many non specific complaints.    patient is poor historian.  per PMD, patient was being worked up diarhea for past few weeks.  In ER patient was found to have possible PNA on CT.  patient also has lactemia on labs.  Patient is being admitted for further work up and treatment. (05 Oct 2018 18:07)      Interval History - c/o abd pain.    Patient is seen, chart was reviewed and case was discussed with the treatment team.  Pt is not in any distress.   Lying on bed comfortably.   No events reported overnight.   No clinical seizure was reported.  Sitting on chair bed comfortably.    is at bedside.    Vital Signs Last 24 Hrs  T(C): 36.4 (09 Oct 2018 08:00), Max: 36.6 (08 Oct 2018 13:00)  T(F): 97.5 (09 Oct 2018 08:00), Max: 97.8 (08 Oct 2018 13:00)  HR: 95 (09 Oct 2018 08:00) (54 - 99)  BP: 103/62 (09 Oct 2018 08:00) (98/64 - 105/70)  BP(mean): --  RR: 18 (09 Oct 2018 08:00) (18 - 19)  SpO2: 98% (09 Oct 2018 08:00) (89% - 98%)      REVIEW OF SYSTEMS:    limited , but not in any distress.    On Neurological Examination:    Mental Status - Pt is alert, awake, oriented X1 Follows commands and able to answer simple questions appropriately.    Speech -  dysarthria.    Cranial Nerves - Pupils 3 mm equal and reactive to light,  Pt has no asymmetry.     Muscle tone - is normal,    Motor Exam UE 3/5   1-2/5.LE     Sensory Exam - Pt withdraws all extremities equally on stimulation.    coordination:    Deep tendon Reflexes - 2 plus all over.       Neck Supple -  Yes.     MEDICATIONS    ALBUTerol    0.083% 2.5 milliGRAM(s) Nebulizer every 6 hours  aztreonam  IVPB 2000 milliGRAM(s) IV Intermittent every 8 hours  buDESOnide   0.5 milliGRAM(s) Respule 0.5 milliGRAM(s) Inhalation every 12 hours  cholestyramine Powder (Sugar-Free) 4 Gram(s) Oral daily  cyanocobalamin Injectable 1000 MICROGram(s) IntraMuscular daily  famotidine    Tablet 20 milliGRAM(s) Oral two times a day  folic acid 1 milliGRAM(s) Oral daily  lactobacillus acidophilus 1 Tablet(s) Oral three times a day with meals  metroNIDAZOLE    Tablet 500 milliGRAM(s) Oral every 8 hours  morphine  - Injectable 2 milliGRAM(s) IV Push every 6 hours PRN  multivitamin 1 Tablet(s) Oral daily  nystatin Powder 1 Application(s) Topical two times a day  ondansetron Injectable 4 milliGRAM(s) IV Push every 6 hours PRN  promethazine Suppository 12.5 milliGRAM(s) Rectal four times a day PRN  QUEtiapine 100 milliGRAM(s) Oral at bedtime  sodium bicarbonate 650 milliGRAM(s) Oral three times a day  sodium chloride 0.65% Nasal 1 Spray(s) Both Nostrils four times a day PRN  tiotropium 18 MICROgram(s) Capsule 1 Capsule(s) Inhalation daily  venlafaxine XR. 150 milliGRAM(s) Oral daily      Allergies    penicillin (Unknown)  strawberry (Unknown)    Intolerances        LABS:  CBC Full  -  ( 08 Oct 2018 07:09 )  WBC Count : 4.37 K/uL  Hemoglobin : 7.9 g/dL  Hematocrit : 25.0 %  Platelet Count - Automated : 95 K/uL  Mean Cell Volume : 90.3 fl  Mean Cell Hemoglobin : 28.5 pg  Mean Cell Hemoglobin Concentration : 31.6 gm/dL  Auto Neutrophil # : 3.67 K/uL  Auto Lymphocyte # : 0.47 K/uL  Auto Monocyte # : 0.11 K/uL  Auto Eosinophil # : 0.04 K/uL  Auto Basophil # : 0.00 K/uL  Auto Neutrophil % : 84.0 %  Auto Lymphocyte % : 10.8 %  Auto Monocyte % : 2.5 %  Auto Eosinophil % : 0.9 %  Auto Basophil % : 0.0 %    Urinalysis Basic - ( 06 Oct 2018 14:53 )    Color: Yellow / Appearance: Clear / S.015 / pH: x  Gluc: x / Ketone: Small  / Bili: Small / Urobili: Negative   Blood: x / Protein: 25 mg/dL / Nitrite: Negative   Leuk Esterase: Trace / RBC: 6-10 /HPF / WBC 0-2   Sq Epi: x / Non Sq Epi: Occasional / Bacteria: Occasional      10-    146<H>  |  117<H>  |  29<H>  ----------------------------<  148<H>  3.9   |  19<L>  |  1.30    Ca    7.7<L>      08 Oct 2018 07:09      Hemoglobin A1C:     Vitamin B12 Vitamin B12, Serum: 198 pg/mL (10-07 @ 18:58)      RADIOLOGY    < from: CT Head No Cont (10.06.18 @ 04:40) >    EXAM:  CT BRAIN                            PROCEDURE DATE:  10/06/2018          INTERPRETATION:  10/6/2018 5:10 AM    CLINICAL HISTORY:  Altered mental status.    TECHNIQUE: Axial CT images are obtained from the cranial vertex to the   skullbase without the administration of IV contrast.    COMPARISON: None    FINDINGS:    There is volume loss accounting for prominent ventricles. There is   atherosclerosis. White matter hypodensities noted compatible with   moderate chronic microvascular ischemic change in this age group. There   is no midline shift, mass effect, extra axial collection, or intracranial   hemorrhage.    The calvarium is intact. The visualized paranasal sinuses are aerated.   The mastoid air cells are clear.    IMPRESSION:    No acute hemorrhage or mass effect. Chronic changes noted                OVI FRANCO M.D., ATTENDING RADIOLOGIST  This document has been electronically signed. Oct  6 2018  5:14AM        ASSESSMENT AND PLAN:      UNRESPONSIVENESS /AMS  IMPROVING LIKELY MULTIFACTORIAL INCLUDING  HYPOTENSION/ HYPOXEMIA .  GI BLEED.  BIPOLAR DISORDER.  ABD PAIN.    SEROQUEL 100 MG HS.  ANALGESIC PRN.  ANTIBIOTIC AS PER MEDICAL TEAM.  Physical therapy evaluation.  OOB to chair/ambulation with assistance only.  Pain is accessed and addressed.  Would continue to follow.

## 2018-10-09 NOTE — PROGRESS NOTE ADULT - ASSESSMENT
74 y/o man w morbid obesity, bipolar disorder, decubitus ulcers, resident of [facility], bedbound for 2 years, being w/u for diarrhea last few weeks, stopped eating/drinking/taking meds for 6weeks, brought in to ER after being seen by his daughter with symptoms of increased lethargy/abdomen pain/diarrhea. Was seen in [facility] 9/30 for similar findings, CT c/a/p only sig for enlarged prostate.  CT head no acute findings, CT a/p w bibasilar pneumonia.  Seen by Neuro/Cardiology/ID/Pulm/Psych, started on antibiotics    noted to have precipitous drop in H/H since 9/30(from 11.3 to 9.3 on  10/5 and 7.5 on 10/6) with peripheral blood smear morphology no sig pathology  C diff negative but stool is guaiac positive  CT a/p no hematoma  Nasal swab positive for MRSA  Post 1 unit PRBC on admission w appropriate incr of H/H  iron studies c/w acute illness/inflammation  w/u severe B12 and folate deficiency  off Coumadin since admission    -anemia/precipitous drop in Hct-due to anorexia/inflammation/infection/B12 and folate deficiency/guaiac positive stool,  continue B12 and folate repletion, follow serial CBC  -mild thrombocytopenia likely due to B12/folate deficiency, adequate at present level, continue serial monitoring    discussed w sister and brother in law

## 2018-10-09 NOTE — PROGRESS NOTE ADULT - PROBLEM SELECTOR PLAN 1
CT negative for acute abdominal pathology   afebrile, no leukocytosis on prior labs  ?started on lactulose for elevated ammonia, having +bms, but also placed on questran prior  prior c diff negative   cont bacid  diet as tolerated, rec low residue/low lactose  monitor stool output/abd exam  would recheck ammonia, if wnl, dc lactulose  if w continued loose stools when off bowel regimen, check gi pcr  may need colon if no improvement

## 2018-10-09 NOTE — PROGRESS NOTE ADULT - SUBJECTIVE AND OBJECTIVE BOX
All interim records and events noted.    awake but confused  sister and brother in law present  still not eating      MEDICATIONS  (STANDING):  ALBUTerol    0.083% 2.5 milliGRAM(s) Nebulizer every 6 hours  buDESOnide   0.5 milliGRAM(s) Respule 0.5 milliGRAM(s) Inhalation every 12 hours  cholestyramine Powder (Sugar-Free) 4 Gram(s) Oral daily  cyanocobalamin Injectable 1000 MICROGram(s) IntraMuscular daily  famotidine    Tablet 20 milliGRAM(s) Oral two times a day  folic acid 1 milliGRAM(s) Oral daily  lactobacillus acidophilus 1 Tablet(s) Oral three times a day with meals  lactulose Syrup 10 Gram(s) Oral two times a day  multivitamin 1 Tablet(s) Oral daily  nystatin Powder 1 Application(s) Topical two times a day  QUEtiapine 100 milliGRAM(s) Oral daily  QUEtiapine 100 milliGRAM(s) Oral at bedtime  sodium bicarbonate 650 milliGRAM(s) Oral three times a day  sodium chloride 0.45%. 1000 milliLiter(s) (100 mL/Hr) IV Continuous <Continuous>  tiotropium 18 MICROgram(s) Capsule 1 Capsule(s) Inhalation daily  venlafaxine XR. 150 milliGRAM(s) Oral daily    MEDICATIONS  (PRN):  morphine  - Injectable 2 milliGRAM(s) IV Push every 6 hours PRN pain/ distress  OLANZapine Injectable 10 milliGRAM(s) IntraMuscular every 6 hours PRN Acute agitation  ondansetron Injectable 4 milliGRAM(s) IV Push every 6 hours PRN Nausea  promethazine Suppository 12.5 milliGRAM(s) Rectal four times a day PRN breakthrough nausea / vomiting  sodium chloride 0.65% Nasal 1 Spray(s) Both Nostrils four times a day PRN Nasal Congestion      Vital Signs Last 24 Hrs  T(C): 36.4 (09 Oct 2018 08:00), Max: 36.6 (08 Oct 2018 13:00)  T(F): 97.5 (09 Oct 2018 08:00), Max: 97.8 (08 Oct 2018 13:00)  HR: 95 (09 Oct 2018 08:00) (54 - 99)  BP: 103/62 (09 Oct 2018 08:00) (98/64 - 105/70)  BP(mean): --  RR: 18 (09 Oct 2018 08:00) (18 - 19)  SpO2: 98% (09 Oct 2018 08:00) (89% - 98%)    PHYSICAL EXAM  General: overweight frail chronically ill appearing man  Head: atraumatic, normocephalic  ENT: sclera anicteric, buccal mucosa moist  Neck: supple, trachea midline, no palpable masses  CV: S1 S2, regular rate and rhythm  Lungs: clear to auscultation, no wheezes/rhonchi  Abdomen: soft, tender epigastric region to palp, bowel sounds present, obese  Extrem: trace edema all extremities  Skin: ecchymosis in extremities  Neuro: see above      LABS:             7.9    4.37  )-----------( 95       ( 10-08 @ 07:09 )             25.0                8.2    6.50  )-----------( 126      ( 10-07 @ 15:01 )             25.2                8.3    x     )-----------( x        ( 10-06 @ 16:16 )             26.3       10-08    146<H>  |  117<H>  |  29<H>  ----------------------------<  148<H>  3.9   |  19<L>  |  1.30    Ca    7.7<L>      08 Oct 2018 07:09      10-07 @ 15:01  PT29.9 INR2.69  PTT35.3  10-06 @ 05:32  PT25.3 INR2.28  PTT31.9  10-05 @ 20:05  PT23.5 INR2.12  PTT33.5  10-05 @ 13:23  PT-- INR--  PTT32.4      RADIOLOGY & ADDITIONAL STUDIES:    IMPRESSION/RECOMMENDATIONS:

## 2018-10-09 NOTE — PROGRESS NOTE ADULT - SUBJECTIVE AND OBJECTIVE BOX
INTERVAL HPI/OVERNIGHT EVENTS:  pt seen and examined  some confusion, mumbling words, unable to participate in ros  per overnight rn pt confused/combative at times, +bms, no s/s overt gib  afebrile overnight no new labs to assess at present    MEDICATIONS  (STANDING):  ALBUTerol    0.083% 2.5 milliGRAM(s) Nebulizer every 6 hours  aztreonam  IVPB 2000 milliGRAM(s) IV Intermittent every 8 hours  buDESOnide   0.5 milliGRAM(s) Respule 0.5 milliGRAM(s) Inhalation every 12 hours  cholestyramine Powder (Sugar-Free) 4 Gram(s) Oral daily  cyanocobalamin Injectable 1000 MICROGram(s) IntraMuscular daily  famotidine    Tablet 20 milliGRAM(s) Oral two times a day  folic acid 1 milliGRAM(s) Oral daily  lactobacillus acidophilus 1 Tablet(s) Oral three times a day with meals  lactulose Syrup 10 Gram(s) Oral two times a day  metroNIDAZOLE    Tablet 500 milliGRAM(s) Oral every 8 hours  multivitamin 1 Tablet(s) Oral daily  nystatin Powder 1 Application(s) Topical two times a day  QUEtiapine 100 milliGRAM(s) Oral at bedtime  sodium bicarbonate 650 milliGRAM(s) Oral three times a day  tiotropium 18 MICROgram(s) Capsule 1 Capsule(s) Inhalation daily  venlafaxine XR. 150 milliGRAM(s) Oral daily    MEDICATIONS  (PRN):  morphine  - Injectable 2 milliGRAM(s) IV Push every 6 hours PRN pain/ distress  ondansetron Injectable 4 milliGRAM(s) IV Push every 6 hours PRN Nausea  promethazine Suppository 12.5 milliGRAM(s) Rectal four times a day PRN breakthrough nausea / vomiting  sodium chloride 0.65% Nasal 1 Spray(s) Both Nostrils four times a day PRN Nasal Congestion      Allergies    penicillin (Unknown)  strawberry (Unknown)    Intolerances        Review of Systems:    unable to obtain in entirety      Vital Signs Last 24 Hrs  T(C): 36.4 (09 Oct 2018 04:51), Max: 36.6 (08 Oct 2018 13:00)  T(F): 97.5 (09 Oct 2018 04:51), Max: 97.8 (08 Oct 2018 13:00)  HR: 58 (09 Oct 2018 07:53) (54 - 99)  BP: 102/69 (09 Oct 2018 04:51) (98/64 - 105/70)  BP(mean): --  RR: 19 (09 Oct 2018 04:51) (18 - 19)  SpO2: 94% (09 Oct 2018 07:53) (89% - 97%)    PHYSICAL EXAM:    Constitutional: obese, in nad, lying in bed  HEENT: ncat  Neck: No LAD  Respiratory: dec bs  Cardiovascular: S1 and S2, RRR  Gastrointestinal: obese abd ?generalized ttp mild dt  Extremities: No peripheral edema  Vascular: 2+ peripheral pulses  Neurological: awake but confused  Skin: No rashes      LABS:                        7.9    4.37  )-----------( 95       ( 08 Oct 2018 07:09 )             25.0     10-08    146<H>  |  117<H>  |  29<H>  ----------------------------<  148<H>  3.9   |  19<L>  |  1.30    Ca    7.7<L>      08 Oct 2018 07:09      PT/INR - ( 07 Oct 2018 15:01 )   PT: 29.9 sec;   INR: 2.69 ratio         PTT - ( 07 Oct 2018 15:01 )  PTT:35.3 sec  Urinalysis Basic - ( 09 Oct 2018 08:24 )    Color: Yellow / Appearance: Slightly Turbid / S.010 / pH: x  Gluc: x / Ketone: Trace  / Bili: Small / Urobili: Negative   Blood: x / Protein: 75 mg/dL / Nitrite: Negative   Leuk Esterase: Small / RBC: 25-50 /HPF / WBC 3-5   Sq Epi: x / Non Sq Epi: Occasional / Bacteria: Few        RADIOLOGY & ADDITIONAL TESTS:

## 2018-10-09 NOTE — PROGRESS NOTE ADULT - PROBLEM SELECTOR PLAN 3
ammonia level mildly elevated   started on lactulose 10 grams BID, having +bms, titrate for 3 softs bms/day  trend ammonia levels, taper lactulose as able  neuro following  rest of care per primary team      further plan of care to be dw attg ammonia level mildly elevated   started on lactulose 10 grams BID, having +bms  trend ammonia levels, taper lactulose as able  neuro following  rest of care per primary team      further plan of care to be dw attg multifactorial  ammonia level mildly elevated   started on lactulose 10 grams BID, having +bms  trend ammonia levels, taper lactulose as able given initial diarrhea  neuro following  rest of care per primary team      further plan of care to be dw attg

## 2018-10-10 NOTE — PROGRESS NOTE ADULT - PROBLEM SELECTOR PLAN 3
multifactorial  ammonia improved sp lactulose  neuro following  rest of care per primary team see above  f/u heme/onc recs  ct w noted splenomegaly, will check us to eval for possible liver dz

## 2018-10-10 NOTE — PROGRESS NOTE ADULT - SUBJECTIVE AND OBJECTIVE BOX
Neurology Follow up note    TIMOTEO PEDERSEN73yMale    HPI:  presented to er after patient was having many non specific complaints.    patient is poor historian.  per PMD, patient was being worked up diarhea for past few weeks.  In ER patient was found to have possible PNA on CT.  patient also has lactemia on labs.  Patient is being admitted for further work up and treatment. (05 Oct 2018 18:07)      Interval History - reported  generalized pain while turning/cleaning    Patient is seen, chart was reviewed and case was discussed with the treatment team.  Pt is not in any distress.   Lying on bed comfortably.   No events reported overnight.   No clinical seizure was reported.  Sitting on chair bed comfortably.    is at bedside.    Vital Signs Last 24 Hrs  T(C): 36.9 (10 Oct 2018 11:45), Max: 37.2 (09 Oct 2018 23:32)  T(F): 98.5 (10 Oct 2018 11:45), Max: 98.9 (09 Oct 2018 23:32)  HR: 73 (10 Oct 2018 11:45) (61 - 95)  BP: 110/69 (10 Oct 2018 11:45) (101/65 - 118/74)  BP(mean): --  RR: 22 (10 Oct 2018 11:45) (20 - 24)  SpO2: 100% (10 Oct 2018 11:45) (95% - 100%)              On Neurological Examination:    Mental Status - Pt is  awake ,able to follow simple commands    Speech -  dysarthric.    Cranial Nerves - Pupils 3 mm equal and reactive to light,  Pt has no  facial asymmetry. Facial sensation is intact  .Tongue - is in midline.    Muscle tone - nl.    Motor Exam - UE 3-4/5.  LE 1-2/5.    Sensory Exam  Pt withdraws all extremities equally on stimulation. No asymmetry seen.      Deep tendon Reflexes - 2 plus all over.       Neck Supple -  Yes.     MEDICATIONS    ALBUTerol    0.083% 2.5 milliGRAM(s) Nebulizer every 6 hours  buDESOnide   0.5 milliGRAM(s) Respule 0.5 milliGRAM(s) Inhalation every 12 hours  cyanocobalamin Injectable 1000 MICROGram(s) IntraMuscular daily  famotidine    Tablet 20 milliGRAM(s) Oral two times a day  folic acid 1 milliGRAM(s) Oral daily  lactobacillus acidophilus 1 Tablet(s) Oral three times a day with meals  morphine  - Injectable 2 milliGRAM(s) IV Push every 4 hours PRN  multivitamin 1 Tablet(s) Oral daily  nystatin Powder 1 Application(s) Topical two times a day  OLANZapine Injectable 10 milliGRAM(s) IntraMuscular every 6 hours PRN  ondansetron Injectable 4 milliGRAM(s) IV Push every 6 hours PRN  promethazine Suppository 12.5 milliGRAM(s) Rectal four times a day PRN  QUEtiapine 100 milliGRAM(s) Oral daily  QUEtiapine 100 milliGRAM(s) Oral at bedtime  sodium bicarbonate 650 milliGRAM(s) Oral three times a day  sodium chloride 0.65% Nasal 1 Spray(s) Both Nostrils four times a day PRN  tiotropium 18 MICROgram(s) Capsule 1 Capsule(s) Inhalation daily  venlafaxine XR. 150 milliGRAM(s) Oral daily      Allergies    penicillin (Unknown)  strawberry (Unknown)    Intolerances        LABS:  CBC Full  -  ( 10 Oct 2018 11:59 )  WBC Count : 4.47 K/uL  Hemoglobin : 7.7 g/dL  Hematocrit : 24.5 %  Platelet Count - Automated : 106 K/uL  Mean Cell Volume : 93.2 fl  Mean Cell Hemoglobin : 29.3 pg  Mean Cell Hemoglobin Concentration : 31.4 gm/dL  Auto Neutrophil # : x  Auto Lymphocyte # : x  Auto Monocyte # : x  Auto Eosinophil # : x  Auto Basophil # : x  Auto Neutrophil % : x  Auto Lymphocyte % : x  Auto Monocyte % : x  Auto Eosinophil % : x  Auto Basophil % : x    Urinalysis Basic - ( 09 Oct 2018 08:24 )    Color: Yellow / Appearance: Slightly Turbid / S.010 / pH: x  Gluc: x / Ketone: Trace  / Bili: Small / Urobili: Negative   Blood: x / Protein: 75 mg/dL / Nitrite: Negative   Leuk Esterase: Small / RBC: 25-50 /HPF / WBC 3-5   Sq Epi: x / Non Sq Epi: Occasional / Bacteria: Few      10-10    149<H>  |  119<H>  |  26<H>  ----------------------------<  123<H>  3.9   |  22  |  1.10    Ca    7.3<L>      10 Oct 2018 07:36      Hemoglobin A1C:     Vitamin B12     RADIOLOGY  < from: CT Head No Cont (10.06.18 @ 04:40) >    EXAM:  CT BRAIN                            PROCEDURE DATE:  10/06/2018          INTERPRETATION:  10/6/2018 5:10 AM    CLINICAL HISTORY:  Altered mental status.    TECHNIQUE: Axial CT images are obtained from the cranial vertex to the   skullbase without the administration of IV contrast.    COMPARISON: None    FINDINGS:    There is volume loss accounting for prominent ventricles. There is   atherosclerosis. White matter hypodensities noted compatible with   moderate chronic microvascular ischemic change in this age group. There   is no midline shift, mass effect, extra axial collection, or intracranial   hemorrhage.    The calvarium is intact. The visualized paranasal sinuses are aerated.   The mastoid air cells are clear.    IMPRESSION:    No acute hemorrhage or mass effect. Chronic changes noted                OVI FRANCO M.D., ATTENDING RADIOLOGIST  This document has been electronically signed. Oct  6 2018  5:14AM                  ASSESSMENT AND PLAN:      AMS multifactorial.  SP GI BLEED.  MULTIPLE DECUBITUS   RESPIRATORY INSUFFICIENCY,  BIPOLAR DISORDER.    WOULD REPEAT CT HEAD.  NEBULIZER.  Pain is accessed and addressed.  Would continue to follow.

## 2018-10-10 NOTE — PROGRESS NOTE ADULT - SUBJECTIVE AND OBJECTIVE BOX
Date/Time Patient Seen:  		  Referring MD:   Data Reviewed	       Patient is a 73y old  Male who presents with a chief complaint of not feeling well (10 Oct 2018 15:27)  in bed  seen and examined  vs and meds reviewed      Subjective/HPI     PAST MEDICAL & SURGICAL HISTORY:  Myocardial infarct, old  Gastroesophageal reflux disease, esophagitis presence not specified  Other pulmonary embolism with acute cor pulmonale, unspecified chronicity  Acute congestive heart failure, unspecified heart failure type  Cellulitis, unspecified cellulitis site: abdomen buttocks  Candida infection  Acute congestive heart failure, unspecified heart failure type  Rheumatoid arthritis with positive rheumatoid factor, involving unspecified site  Primary osteoarthritis, unspecified site  Chronic atrial fibrillation  Bipolar 1 disorder  Angina at rest  COPD mixed type  Essential hypertension  Morbid obesity        Medication list         MEDICATIONS  (STANDING):  ALBUTerol    0.083% 2.5 milliGRAM(s) Nebulizer every 6 hours  buDESOnide   0.5 milliGRAM(s) Respule 0.5 milliGRAM(s) Inhalation every 12 hours  cyanocobalamin Injectable 1000 MICROGram(s) IntraMuscular daily  folic acid Injectable 1 milliGRAM(s) IV Push daily  lactobacillus acidophilus 1 Tablet(s) Oral three times a day with meals  multivitamin 1 Tablet(s) Oral daily  nystatin Powder 1 Application(s) Topical two times a day  pantoprazole  Injectable 40 milliGRAM(s) IV Push daily  QUEtiapine 100 milliGRAM(s) Oral daily  QUEtiapine 100 milliGRAM(s) Oral at bedtime  sodium bicarbonate 650 milliGRAM(s) Oral three times a day  tiotropium 18 MICROgram(s) Capsule 1 Capsule(s) Inhalation daily  venlafaxine XR. 150 milliGRAM(s) Oral daily    MEDICATIONS  (PRN):  morphine  - Injectable 2 milliGRAM(s) IV Push every 4 hours PRN Mild Pain (1 - 3)  OLANZapine Injectable 10 milliGRAM(s) IntraMuscular every 6 hours PRN Acute agitation  ondansetron Injectable 4 milliGRAM(s) IV Push every 6 hours PRN Nausea  promethazine Suppository 12.5 milliGRAM(s) Rectal four times a day PRN breakthrough nausea / vomiting  sodium chloride 0.65% Nasal 1 Spray(s) Both Nostrils four times a day PRN Nasal Congestion         Vitals log        ICU Vital Signs Last 24 Hrs  T(C): 37 (10 Oct 2018 15:46), Max: 37.2 (09 Oct 2018 23:32)  T(F): 98.6 (10 Oct 2018 15:46), Max: 98.9 (09 Oct 2018 23:32)  HR: 59 (10 Oct 2018 15:46) (59 - 95)  BP: 109/82 (10 Oct 2018 15:46) (101/65 - 118/74)  BP(mean): --  ABP: --  ABP(mean): --  RR: 21 (10 Oct 2018 15:46) (20 - 24)  SpO2: 95% (10 Oct 2018 15:46) (95% - 100%)           Input and Output:  I&O's Detail    09 Oct 2018 07:01  -  10 Oct 2018 07:00  --------------------------------------------------------  IN:    Oral Fluid: 200 mL  Total IN: 200 mL    OUT:    Indwelling Catheter - Urethral: 850 mL  Total OUT: 850 mL    Total NET: -650 mL      10 Oct 2018 07:01  -  10 Oct 2018 18:08  --------------------------------------------------------  IN:  Total IN: 0 mL    OUT:    Indwelling Catheter - Urethral: 400 mL  Total OUT: 400 mL    Total NET: -400 mL          Lab Data                        7.7    4.47  )-----------( 106      ( 10 Oct 2018 11:59 )             24.5     10-10    149<H>  |  119<H>  |  26<H>  ----------------------------<  135<H>  4.4   |  23  |  1.40<H>    Ca    7.6<L>      10 Oct 2018 11:59  Phos  1.9     10-10  Mg     2.3     10-10    TPro  6.2  /  Alb  2.3<L>  /  TBili  0.6  /  DBili  x   /  AST  18  /  ALT  17  /  AlkPhos  81  10-10    ABG - ( 10 Oct 2018 11:36 )  pH, Arterial: 7.34  pH, Blood: x     /  pCO2: 37    /  pO2: 89    / HCO3: 20    / Base Excess: -5.5  /  SaO2: 97                CARDIAC MARKERS ( 10 Oct 2018 11:59 )  <.015 ng/mL / x     / 130 U/L / x     / 4.2 ng/mL        Review of Systems	      Objective     Physical Examination    heart s1s2  lung dec BS  abd soft      Pertinent Lab findings & Imaging      Harjinder:  NO   Adequate UO     I&O's Detail    09 Oct 2018 07:01  -  10 Oct 2018 07:00  --------------------------------------------------------  IN:    Oral Fluid: 200 mL  Total IN: 200 mL    OUT:    Indwelling Catheter - Urethral: 850 mL  Total OUT: 850 mL    Total NET: -650 mL      10 Oct 2018 07:01  -  10 Oct 2018 18:08  --------------------------------------------------------  IN:  Total IN: 0 mL    OUT:    Indwelling Catheter - Urethral: 400 mL  Total OUT: 400 mL    Total NET: -400 mL               Discussed with:     Cultures:	        Radiology

## 2018-10-10 NOTE — PROGRESS NOTE ADULT - SUBJECTIVE AND OBJECTIVE BOX
INTERVAL HPI/OVERNIGHT EVENTS:  pt seen and examined  lethargic in bed, +bm yesterday  per overnight rn no s/s overt gib  afebrile overnight, labs noted, transfusion ordered    MEDICATIONS  (STANDING):  ALBUTerol    0.083% 2.5 milliGRAM(s) Nebulizer every 6 hours  buDESOnide   0.5 milliGRAM(s) Respule 0.5 milliGRAM(s) Inhalation every 12 hours  cholestyramine Powder (Sugar-Free) 4 Gram(s) Oral daily  cyanocobalamin Injectable 1000 MICROGram(s) IntraMuscular daily  famotidine    Tablet 20 milliGRAM(s) Oral two times a day  folic acid 1 milliGRAM(s) Oral daily  lactobacillus acidophilus 1 Tablet(s) Oral three times a day with meals  multivitamin 1 Tablet(s) Oral daily  nystatin Powder 1 Application(s) Topical two times a day  QUEtiapine 100 milliGRAM(s) Oral daily  QUEtiapine 100 milliGRAM(s) Oral at bedtime  sodium bicarbonate 650 milliGRAM(s) Oral three times a day  tiotropium 18 MICROgram(s) Capsule 1 Capsule(s) Inhalation daily  venlafaxine XR. 150 milliGRAM(s) Oral daily    MEDICATIONS  (PRN):  morphine  - Injectable 2 milliGRAM(s) IV Push every 6 hours PRN pain/ distress  OLANZapine Injectable 10 milliGRAM(s) IntraMuscular every 6 hours PRN Acute agitation  ondansetron Injectable 4 milliGRAM(s) IV Push every 6 hours PRN Nausea  promethazine Suppository 12.5 milliGRAM(s) Rectal four times a day PRN breakthrough nausea / vomiting  sodium chloride 0.65% Nasal 1 Spray(s) Both Nostrils four times a day PRN Nasal Congestion      Allergies    penicillin (Unknown)  strawberry (Unknown)    Intolerances        Review of Systems:  unable to obtain    Vital Signs Last 24 Hrs  T(C): 36.7 (10 Oct 2018 08:06), Max: 37.2 (09 Oct 2018 23:32)  T(F): 98 (10 Oct 2018 08:06), Max: 98.9 (09 Oct 2018 23:32)  HR: 92 (10 Oct 2018 08:06) (61 - 95)  BP: 105/72 (10 Oct 2018 08:06) (100/61 - 118/74)  BP(mean): --  RR: 24 (10 Oct 2018 08:06) (20 - 24)  SpO2: 99% (10 Oct 2018 08:06) (95% - 100%)    PHYSICAL EXAM:    Constitutional: obese, in nad, lying in bed  HEENT: ncat  Neck: No LAD  Respiratory: dec bs  Cardiovascular: S1 and S2, RRR  Gastrointestinal: obese abd appears nt mild dt  Extremities: No peripheral edema  Vascular: 2+ peripheral pulses  Neurological: lethargic but arousable  Skin: No rashes      LABS:                        6.9    3.47  )-----------( 84       ( 10 Oct 2018 07:36 )             21.8     10-10    149<H>  |  119<H>  |  26<H>  ----------------------------<  123<H>  3.9   |  22  |  1.10    Ca    7.3<L>      10 Oct 2018 07:36        Urinalysis Basic - ( 09 Oct 2018 08:24 )    Color: Yellow / Appearance: Slightly Turbid / S.010 / pH: x  Gluc: x / Ketone: Trace  / Bili: Small / Urobili: Negative   Blood: x / Protein: 75 mg/dL / Nitrite: Negative   Leuk Esterase: Small / RBC: 25-50 /HPF / WBC 3-5   Sq Epi: x / Non Sq Epi: Occasional / Bacteria: Few        RADIOLOGY & ADDITIONAL TESTS:

## 2018-10-10 NOTE — PROGRESS NOTE ADULT - SUBJECTIVE AND OBJECTIVE BOX
INTERVAL HPI/OVERNIGHT EVENTS:  pt seen and examined  lethargic in bed, +bm yesterday  per overnight rn no s/s overt gib  transfusion ordered    MEDICATIONS  (STANDING):  ALBUTerol    0.083% 2.5 milliGRAM(s) Nebulizer every 6 hours  buDESOnide   0.5 milliGRAM(s) Respule 0.5 milliGRAM(s) Inhalation every 12 hours  cholestyramine Powder (Sugar-Free) 4 Gram(s) Oral daily  cyanocobalamin Injectable 1000 MICROGram(s) IntraMuscular daily  famotidine    Tablet 20 milliGRAM(s) Oral two times a day  folic acid 1 milliGRAM(s) Oral daily  lactobacillus acidophilus 1 Tablet(s) Oral three times a day with meals  multivitamin 1 Tablet(s) Oral daily  nystatin Powder 1 Application(s) Topical two times a day  QUEtiapine 100 milliGRAM(s) Oral daily  QUEtiapine 100 milliGRAM(s) Oral at bedtime  sodium bicarbonate 650 milliGRAM(s) Oral three times a day  tiotropium 18 MICROgram(s) Capsule 1 Capsule(s) Inhalation daily  venlafaxine XR. 150 milliGRAM(s) Oral daily    MEDICATIONS  (PRN):  morphine  - Injectable 2 milliGRAM(s) IV Push every 6 hours PRN pain/ distress  OLANZapine Injectable 10 milliGRAM(s) IntraMuscular every 6 hours PRN Acute agitation  ondansetron Injectable 4 milliGRAM(s) IV Push every 6 hours PRN Nausea  promethazine Suppository 12.5 milliGRAM(s) Rectal four times a day PRN breakthrough nausea / vomiting  sodium chloride 0.65% Nasal 1 Spray(s) Both Nostrils four times a day PRN Nasal Congestion      Allergies    penicillin (Unknown)  strawberry (Unknown)    Intolerances        Review of Systems:  unable to obtain    Vital Signs Last 24 Hrs  stable     PHYSICAL EXAM:    Constitutional: obese, in nad, lying in bed  HEENT: ncat  Neck: No LAD  Respiratory: dec bs  Cardiovascular: S1 and S2, RRR  Gastrointestinal: obese abd appears nt mild dt  Extremities: No peripheral edema  Vascular: 2+ peripheral pulses  Neurological: lethargic but arousable  Skin: No rashes      LABS:                        6.9    3.47  )-----------( 84       ( 10 Oct 2018 07:36 )             21.8     10-10    149<H>  |  119<H>  |  26<H>  ----------------------------<  123<H>  3.9   |  22  |  1.10    Ca    7.3<L>      10 Oct 2018 07:36        Urinalysis Basic - ( 09 Oct 2018 08:24 )    Color: Yellow / Appearance: Slightly Turbid / S.010 / pH: x  Gluc: x / Ketone: Trace  / Bili: Small / Urobili: Negative   Blood: x / Protein: 75 mg/dL / Nitrite: Negative   Leuk Esterase: Small / RBC: 25-50 /HPF / WBC 3-5   Sq Epi: x / Non Sq Epi: Occasional / Bacteria: Few        RADIOLOGY & ADDITIONAL TESTS: INTERVAL HPI/OVERNIGHT EVENTS:  pt seen and examined  lethargic in bed, found unresponsive   rapid response is in progresss    MEDICATIONS  (STANDING):  ALBUTerol    0.083% 2.5 milliGRAM(s) Nebulizer every 6 hours  buDESOnide   0.5 milliGRAM(s) Respule 0.5 milliGRAM(s) Inhalation every 12 hours  cholestyramine Powder (Sugar-Free) 4 Gram(s) Oral daily  cyanocobalamin Injectable 1000 MICROGram(s) IntraMuscular daily  famotidine    Tablet 20 milliGRAM(s) Oral two times a day  folic acid 1 milliGRAM(s) Oral daily  lactobacillus acidophilus 1 Tablet(s) Oral three times a day with meals  multivitamin 1 Tablet(s) Oral daily  nystatin Powder 1 Application(s) Topical two times a day  QUEtiapine 100 milliGRAM(s) Oral daily  QUEtiapine 100 milliGRAM(s) Oral at bedtime  sodium bicarbonate 650 milliGRAM(s) Oral three times a day  tiotropium 18 MICROgram(s) Capsule 1 Capsule(s) Inhalation daily  venlafaxine XR. 150 milliGRAM(s) Oral daily    MEDICATIONS  (PRN):  morphine  - Injectable 2 milliGRAM(s) IV Push every 6 hours PRN pain/ distress  OLANZapine Injectable 10 milliGRAM(s) IntraMuscular every 6 hours PRN Acute agitation  ondansetron Injectable 4 milliGRAM(s) IV Push every 6 hours PRN Nausea  promethazine Suppository 12.5 milliGRAM(s) Rectal four times a day PRN breakthrough nausea / vomiting  sodium chloride 0.65% Nasal 1 Spray(s) Both Nostrils four times a day PRN Nasal Congestion      Allergies    penicillin (Unknown)  strawberry (Unknown)    Intolerances        Review of Systems:  unable to obtain    Vital Signs Last 24 Hrs  stable     PHYSICAL EXAM:    Constitutional: obese, altered   HEENT: ncat  Respiratory: dec bs  Cardiovascular: S1 and S2, RRR  Gastrointestinal: obese abd appears nt mild dt  Extremities: No peripheral edema  Vascular: 2+ peripheral pulses  Neurological: lethargic, altered   Skin: No rashes      LABS:                        6.9    3.47  )-----------( 84       ( 10 Oct 2018 07:36 )             21.8     10-10    149<H>  |  119<H>  |  26<H>  ----------------------------<  123<H>  3.9   |  22  |  1.10    Ca    7.3<L>      10 Oct 2018 07:36        Urinalysis Basic - ( 09 Oct 2018 08:24 )    Color: Yellow / Appearance: Slightly Turbid / S.010 / pH: x  Gluc: x / Ketone: Trace  / Bili: Small / Urobili: Negative   Blood: x / Protein: 75 mg/dL / Nitrite: Negative   Leuk Esterase: Small / RBC: 25-50 /HPF / WBC 3-5   Sq Epi: x / Non Sq Epi: Occasional / Bacteria: Few        RADIOLOGY & ADDITIONAL TESTS:

## 2018-10-10 NOTE — CHART NOTE - NSCHARTNOTEFT_GEN_A_CORE
Resident Rapid Response Note    follow up rapid response note 2 hours after RR was called.       Patient was seen and examined at the bedside. When asked if patient had improvement of symptoms from 2 hours prior, patient said yes but did mention chest pain. This was reproducible on exam and may be attributed to sternal rub during RR. Patient denies palpitations SOB, or palpitations. Patient is ready to go down to CT head. CT head pending results.     Vital Signs Last 24 Hrs  T(C): 36.9 (10 Oct 2018 11:45), Max: 37.2 (09 Oct 2018 23:32)  T(F): 98.5 (10 Oct 2018 11:45), Max: 98.9 (09 Oct 2018 23:32)  HR: 73 (10 Oct 2018 11:45) (61 - 95)  BP: 110/69 (10 Oct 2018 11:45) (101/65 - 118/74)  BP(mean): --  RR: 22 (10 Oct 2018 11:45) (20 - 24)  SpO2: 100% (10 Oct 2018 11:45) (95% - 100%)    LABS:                        7.7    4.47  )-----------( 106      ( 10 Oct 2018 11:59 )             24.5     10-10    149<H>  |  119<H>  |  26<H>  ----------------------------<  135<H>  4.4   |  23  |  1.40<H>    Ca    7.6<L>      10 Oct 2018 11:59  Phos  1.9     10-10  Mg     2.3     10-10    TPro  6.2  /  Alb  2.3<L>  /  TBili  0.6  /  DBili  x   /  AST  18  /  ALT  17  /  AlkPhos  81  10-10    LIVER FUNCTIONS - ( 10 Oct 2018 11:59 )  Alb: 2.3 g/dL / Pro: 6.2 g/dL / ALK PHOS: 81 U/L / ALT: 17 U/L / AST: 18 U/L / GGT: x           PT/INR - ( 10 Oct 2018 11:59 )   PT: 25.8 sec;   INR: 2.33 ratio           Urinalysis Basic - ( 09 Oct 2018 08:24 )    Color: Yellow / Appearance: Slightly Turbid / S.010 / pH: x  Gluc: x / Ketone: Trace  / Bili: Small / Urobili: Negative   Blood: x / Protein: 75 mg/dL / Nitrite: Negative   Leuk Esterase: Small / RBC: 25-50 /HPF / WBC 3-5   Sq Epi: x / Non Sq Epi: Occasional / Bacteria: Few            Physical Exam:  General: patient in no acute distress  HEENT: NCAT, EOMI bl   Neurology: A&Ox3  Respiratory: CTA B/L, No W/R/R  CV: RRR, +S1/S2, no murmurs, rubs or gallops  Abdominal: Soft, NT, ND +BSx4, morbidly obese  Extremities: 2+ peripheral edema in b/l LE, + peripheral pulses  MSK: reduced ROM as patient is morbidly obese, no joint erythema or warmth, no joint swelling   Skin: warm, dry, scattered closed wounds through body, some open wound under pannus.       Assessment/Plan:    CKMB elevated 4.2- will order another set to trend.   trops negative    Will follow up with head CT.      Discussed plan with Dr. Saeed (neurology) and will update on CT head.     -Will continue to follow, RN to call if any changes. Resident Rapid Response Note    follow up rapid response note 2 hours after RR was called. RRT had been called for AMS      Patient was seen and examined at the bedside. When asked if patient had improvement of symptoms from 2 hours prior, patient said yes but did mention chest pain. This was reproducible on exam and may be attributed to sternal rub during RR. Patient denies palpitations SOB, or palpitations. Patient is ready to go down to CT head. CT head pending results.     Vital Signs Last 24 Hrs  T(C): 36.9 (10 Oct 2018 11:45), Max: 37.2 (09 Oct 2018 23:32)  T(F): 98.5 (10 Oct 2018 11:45), Max: 98.9 (09 Oct 2018 23:32)  HR: 73 (10 Oct 2018 11:45) (61 - 95)  BP: 110/69 (10 Oct 2018 11:45) (101/65 - 118/74)  BP(mean): --  RR: 22 (10 Oct 2018 11:45) (20 - 24)  SpO2: 100% (10 Oct 2018 11:45) (95% - 100%)    LABS:                        7.7    4.47  )-----------( 106      ( 10 Oct 2018 11:59 )             24.5     10-10    149<H>  |  119<H>  |  26<H>  ----------------------------<  135<H>  4.4   |  23  |  1.40<H>    Ca    7.6<L>      10 Oct 2018 11:59  Phos  1.9     10-10  Mg     2.3     10-10    TPro  6.2  /  Alb  2.3<L>  /  TBili  0.6  /  DBili  x   /  AST  18  /  ALT  17  /  AlkPhos  81  10-10    LIVER FUNCTIONS - ( 10 Oct 2018 11:59 )  Alb: 2.3 g/dL / Pro: 6.2 g/dL / ALK PHOS: 81 U/L / ALT: 17 U/L / AST: 18 U/L / GGT: x           PT/INR - ( 10 Oct 2018 11:59 )   PT: 25.8 sec;   INR: 2.33 ratio           Urinalysis Basic - ( 09 Oct 2018 08:24 )    Color: Yellow / Appearance: Slightly Turbid / S.010 / pH: x  Gluc: x / Ketone: Trace  / Bili: Small / Urobili: Negative   Blood: x / Protein: 75 mg/dL / Nitrite: Negative   Leuk Esterase: Small / RBC: 25-50 /HPF / WBC 3-5   Sq Epi: x / Non Sq Epi: Occasional / Bacteria: Few        Physical Exam:  General: patient in no acute distress  HEENT: NCAT, EOMI bl   Neurology: A&Ox3  Respiratory: CTA B/L, No W/R/R  CV: RRR, +S1/S2, no murmurs, rubs or gallops  Abdominal: Soft, NT, ND +BSx4, morbidly obese  Extremities: 2+ peripheral edema in b/l LE, + peripheral pulses  MSK: reduced ROM as patient is morbidly obese, no joint erythema or warmth, no joint swelling; bilateral leg pain with movement  Skin: warm, dry, scattered closed wounds through body, some open wound under pannus.       Assessment/Plan:  74 y/o man w morbid obesity, bipolar disorder, decubitus ulcers, resident of [facility], bedbound for 2 years, being w/u for diarrhea last few weeks, stopped eating/drinking/taking meds for 6weeks, brought in to ER after being seen by his daughter with symptoms of increased lethargy/abdomen pain/diarrhea.  RR had been called for AMS.   Cardiac enzymes completed. CKMB elevated 4.2, Trop negative- will order another cardiac enzymes set to trend.  Will follow up with head CT.  Discussed plan with Dr. Madison (neurology) and will update on CT head.    -Will continue to follow, RN to call if any changes.

## 2018-10-10 NOTE — PROGRESS NOTE ADULT - PROBLEM SELECTOR PLAN 1
resolved per nursing, no bms overnight  CT negative for acute abdominal pathology   prior c diff negative   cont bacid  diet as tolerated  monitor stool output/abd exam  if w recurring loose stools, check gi pcr

## 2018-10-10 NOTE — PROGRESS NOTE ADULT - PROBLEM SELECTOR PLAN 1
overall better  however, significant collection of medical issues  at risk for decompensation   planned for dc, [facility] vs home with assist  cont bronchodilators, would dc Pulmicort  monitor vs and HD and Sat  keep sat > 88 pct  dietary discretion

## 2018-10-10 NOTE — PROGRESS NOTE ADULT - PROBLEM SELECTOR PLAN 2
drop in hgb noted, no s/s overt gib per nursing  daily cbc, transfuse prn  cont pepcid  heme/onc recs appreciated, anemia multifactorial  hold ac asa nsaids  will need egd/colon if hcp agreeable along w medical/cardiac clearance, pt without decision making capacity per psych, timing to be dw attg drop in hgb noted, no s/s overt gib per nursing  recheck coags  daily cbc, transfuse prn  cont pepcid  heme/onc recs appreciated, anemia multifactorial  hold ac asa nsaids  will need egd/colon if hcp agreeable along w medical/cardiac clearance, pt without decision making capacity per psych, timing to be dw attg drop in hgb noted, no s/s active gib per nursing, pt also pancytopenic, recheck coags, transfuse as needed  if high suspicion for gib repeat ct a/p  trend h/h  cont pepcid  heme/onc recs appreciated, anemia multifactorial  hold ac asa nsaids  monitor for need of further gi intervention pending clinical course

## 2018-10-10 NOTE — PROGRESS NOTE ADULT - ASSESSMENT
73 year old male with HTN, and documented MI and congestive heart failure, here with altered mental status.  Unclear etiology of symptoms though Likely all metabolic in origin. He has been starving himself for the last 2 weeks and has had significant diarrhea  Labs notable for elevated BNP, with some mild overload on exam    #1 AMS   - No sign of acute ischemia. EKG is sinus tachycardia with APCs.   - No tele events except sinus arrhythmia   - tachy is likely reactive  - s/p RRT today for AMS, however, awakened and became agitated.  Now back to baseline    #2 DVT/PE  - Coumadin on hold  - H/H trending down.  Transfuse to keep Hgb >7.  Will need Lasix post transfusion    #3 HFpEF  - mild Volume Overload  - TTE showed preserved LVF with evidence of diastolic dysfunction but no significant valvular diseases.  - does not appear to be in sig ADHF at this time.   - Hold off on diuresis at this time   - Watch creatinine and electrolytes. Keep K>4, Mg>2    #4 DIOR  - Creatinine is stable  - Avoid nephrotoxics     - Further cardiac workup pending clinical course  - Will follow     Margot Kebede NP  Cardiology

## 2018-10-10 NOTE — PROGRESS NOTE ADULT - ASSESSMENT
·	Prerenal azotemia, CKD 3  ·	Anemia  ·	Abd pain, Diarrhea    Stable renal function. To continue current meds. Monitor h/h trend. Transfuse PRN.  GI and hematology follow up. Will follow electrolytes and renal function trend.

## 2018-10-10 NOTE — PROGRESS NOTE ADULT - SUBJECTIVE AND OBJECTIVE BOX
73yMale admitted to med/surg with following history:  HPI:  presented to er after patient was having many non specific complaints.    patient is poor historian.  per PMD, patient was being worked up diarhea for past few weeks.  In ER patient was found to have possible PNA on CT.  patient also has lactemia on labs.  Patient is being admitted for further work up and treatment. (05 Oct 2018 18:07)      Psych HPI: Patient seen, evaluated and chart reviewed. Sister and brother-in-law at the bedside. Patient is currently lethargic and unable to engage. No prns were given over the last 24 hours. Patient received pain medications earlier this morning.    PAST MEDICAL & SURGICAL HISTORY:  Myocardial infarct, old  Gastroesophageal reflux disease, esophagitis presence not specified  Other pulmonary embolism with acute cor pulmonale, unspecified chronicity  Acute congestive heart failure, unspecified heart failure type  Cellulitis, unspecified cellulitis site: abdomen buttocks  Candida infection  Acute congestive heart failure, unspecified heart failure type  Rheumatoid arthritis with positive rheumatoid factor, involving unspecified site  Primary osteoarthritis, unspecified site  Chronic atrial fibrillation  Bipolar 1 disorder  Angina at rest  COPD mixed type  Essential hypertension  Morbid obesity      Allergies    penicillin (Unknown)  strawberry (Unknown)    Intolerances      MEDICATIONS  (STANDING):  ALBUTerol    0.083% 2.5 milliGRAM(s) Nebulizer every 6 hours  buDESOnide   0.5 milliGRAM(s) Respule 0.5 milliGRAM(s) Inhalation every 12 hours  cyanocobalamin Injectable 1000 MICROGram(s) IntraMuscular daily  famotidine    Tablet 20 milliGRAM(s) Oral two times a day  folic acid Injectable 1 milliGRAM(s) IV Push daily  lactobacillus acidophilus 1 Tablet(s) Oral three times a day with meals  multivitamin 1 Tablet(s) Oral daily  nystatin Powder 1 Application(s) Topical two times a day  QUEtiapine 100 milliGRAM(s) Oral daily  QUEtiapine 100 milliGRAM(s) Oral at bedtime  sodium bicarbonate 650 milliGRAM(s) Oral three times a day  tiotropium 18 MICROgram(s) Capsule 1 Capsule(s) Inhalation daily  venlafaxine XR. 150 milliGRAM(s) Oral daily    MEDICATIONS  (PRN):  morphine  - Injectable 2 milliGRAM(s) IV Push every 4 hours PRN Mild Pain (1 - 3)  OLANZapine Injectable 10 milliGRAM(s) IntraMuscular every 6 hours PRN Acute agitation  ondansetron Injectable 4 milliGRAM(s) IV Push every 6 hours PRN Nausea  promethazine Suppository 12.5 milliGRAM(s) Rectal four times a day PRN breakthrough nausea / vomiting  sodium chloride 0.65% Nasal 1 Spray(s) Both Nostrils four times a day PRN Nasal Congestion        ROS: Psych: See HPI.  All other systems negative.                          7.7    4.47  )-----------( 106      ( 10 Oct 2018 11:59 )             24.5   10 Oct 2018 11:59    149    |  119    |  26     ----------------------------<  135    4.4     |  23     |  1.40     Ca    7.6        10 Oct 2018 11:59  Phos  1.9       10 Oct 2018 11:59  Mg     2.3       10 Oct 2018 11:59    TPro  6.2    /  Alb  2.3    /  TBili  0.6    /  DBili  x      /  AST  18     /  ALT  17     /  AlkPhos  81     10 Oct 2018 11:59    TSH:     Utox:  Imaging:  Other Tests:    Old Records reviewed:    EXAM:  Vital Signs Last 24 Hrs  T(C): 36.9 (10-10-18 @ 11:45), Max: 37.2 (10-09-18 @ 23:32)  T(F): 98.5 (10-10-18 @ 11:45), Max: 98.9 (10-09-18 @ 23:32)  HR: 73 (10-10-18 @ 11:45) (70 - 95)  BP: 110/69 (10-10-18 @ 11:45) (101/65 - 118/74)  BP(mean): --  RR: 22 (10-10-18 @ 11:45) (20 - 24)  SpO2: 100% (10-10-18 @ 11:45) (95% - 100%)  MSE - limited due to patient's lethargy    DX: Delirium    REC: Continue current treatment

## 2018-10-10 NOTE — PROGRESS NOTE ADULT - SUBJECTIVE AND OBJECTIVE BOX
Patient is a 73y old  Male who presents with a chief complaint of not feeling well (08 Oct 2018 10:23)      Patient seen in follow up for CKD 3. Pt with poor PO intake.    PAST MEDICAL HISTORY:  Myocardial infarct, old  Gastroesophageal reflux disease, esophagitis presence not specified  Other pulmonary embolism with acute cor pulmonale, unspecified chronicity  Acute congestive heart failure, unspecified heart failure type  Cellulitis, unspecified cellulitis site  Candida infection  Acute congestive heart failure, unspecified heart failure type  Rheumatoid arthritis with positive rheumatoid factor, involving unspecified site  Primary osteoarthritis, unspecified site  Chronic atrial fibrillation  Bipolar 1 disorder  Angina at rest  COPD mixed type  Essential hypertension  Morbid obesity    MEDICATIONS  (STANDING):  ALBUTerol    0.083% 2.5 milliGRAM(s) Nebulizer every 6 hours  buDESOnide   0.5 milliGRAM(s) Respule 0.5 milliGRAM(s) Inhalation every 12 hours  cyanocobalamin Injectable 1000 MICROGram(s) IntraMuscular daily  famotidine    Tablet 20 milliGRAM(s) Oral two times a day  folic acid Injectable 1 milliGRAM(s) IV Push daily  lactobacillus acidophilus 1 Tablet(s) Oral three times a day with meals  multivitamin 1 Tablet(s) Oral daily  nystatin Powder 1 Application(s) Topical two times a day  QUEtiapine 100 milliGRAM(s) Oral daily  QUEtiapine 100 milliGRAM(s) Oral at bedtime  sodium bicarbonate 650 milliGRAM(s) Oral three times a day  tiotropium 18 MICROgram(s) Capsule 1 Capsule(s) Inhalation daily  venlafaxine XR. 150 milliGRAM(s) Oral daily    MEDICATIONS  (PRN):  morphine  - Injectable 2 milliGRAM(s) IV Push every 4 hours PRN Mild Pain (1 - 3)  OLANZapine Injectable 10 milliGRAM(s) IntraMuscular every 6 hours PRN Acute agitation  ondansetron Injectable 4 milliGRAM(s) IV Push every 6 hours PRN Nausea  promethazine Suppository 12.5 milliGRAM(s) Rectal four times a day PRN breakthrough nausea / vomiting  sodium chloride 0.65% Nasal 1 Spray(s) Both Nostrils four times a day PRN Nasal Congestion    T(C): 36.9 (10-10-18 @ 11:45), Max: 37.2 (10-09-18 @ 23:32)  HR: 73 (10-10-18 @ 11:45) (56 - 95)  BP: 110/69 (10-10-18 @ 11:45) (98/64 - 118/74)  RR: 22 (10-10-18 @ 11:45)  SpO2: 100% (10-10-18 @ 11:45)  Wt(kg): --  I&O's Detail    09 Oct 2018 07:01  -  10 Oct 2018 07:00  --------------------------------------------------------  IN:    Oral Fluid: 200 mL  Total IN: 200 mL    OUT:    Indwelling Catheter - Urethral: 850 mL  Total OUT: 850 mL    Total NET: -650 mL      10 Oct 2018 07:01  -  10 Oct 2018 14:48  --------------------------------------------------------  IN:  Total IN: 0 mL    OUT:    Indwelling Catheter - Urethral: 400 mL  Total OUT: 400 mL    Total NET: -400 mL              PHYSICAL EXAM:  General: NAD  Respiratory: b/l air entry  Cardiovascular: S1 S2  Gastrointestinal: soft  Extremities:  + edema                  LABORATORY:                        7.7    4.47  )-----------( 106      ( 10 Oct 2018 11:59 )             24.5     10-10    149<H>  |  119<H>  |  26<H>  ----------------------------<  135<H>  4.4   |  23  |  1.40<H>    Ca    7.6<L>      10 Oct 2018 11:59  Phos  1.9     10-10  Mg     2.3     10-10    TPro  6.2  /  Alb  2.3<L>  /  TBili  0.6  /  DBili  x   /  AST  18  /  ALT  17  /  AlkPhos  81  10-10    Sodium, Serum: 149 mmol/L (10-10 @ 11:59)  Sodium, Serum: 149 mmol/L (10-10 @ 07:36)    Potassium, Serum: 4.4 mmol/L (10-10 @ 11:59)  Potassium, Serum: 3.9 mmol/L (10-10 @ 07:36)    Hemoglobin: 7.7 g/dL (10-10 @ 11:59)  Hemoglobin: 6.9 g/dL (10-10 @ 07:36)  Hemoglobin: 7.1 g/dL (10-09 @ 17:05)  Hemoglobin: 7.9 g/dL (10-08 @ 07:09)    Creatinine, Serum 1.40 (10-10 @ 11:59)  Creatinine, Serum 1.10 (10-10 @ 07:36)  Creatinine, Serum 1.30 (10-08 @ 07:09)  Creatinine, Serum 1.40 (10-07 @ 15:01)    CARDIAC MARKERS ( 10 Oct 2018 11:59 )  <.015 ng/mL / x     / 130 U/L / x     / 4.2 ng/mL      LIVER FUNCTIONS - ( 10 Oct 2018 11:59 )  Alb: 2.3 g/dL / Pro: 6.2 g/dL / ALK PHOS: 81 U/L / ALT: 17 U/L / AST: 18 U/L / GGT: x           Urinalysis Basic - ( 09 Oct 2018 08:24 )    Color: Yellow / Appearance: Slightly Turbid / S.010 / pH: x  Gluc: x / Ketone: Trace  / Bili: Small / Urobili: Negative   Blood: x / Protein: 75 mg/dL / Nitrite: Negative   Leuk Esterase: Small / RBC: 25-50 /HPF / WBC 3-5   Sq Epi: x / Non Sq Epi: Occasional / Bacteria: Few      ABG - ( 10 Oct 2018 11:36 )  pH, Arterial: 7.34  pH, Blood: x     /  pCO2: 37    /  pO2: 89    / HCO3: 20    / Base Excess: -5.5  /  SaO2: 97

## 2018-10-10 NOTE — CHART NOTE - NSCHARTNOTEFT_GEN_A_CORE
Resident Rapid Response Note    Rapid response was called at 11:23AM for Altered mental status.    Events leading up to Rapid Response: the patient was getting cleaned when he became unresponsive. The patient was difficult to arouse with sternal rub.     Patient was seen and examined at the bedside by the rapid response team and resident medicine team. ICU fellow was at bedside. Patient was awake and agitated when examined at bedside. Patient was scratching himself and the nurses as he was very irritated. Patient was on 3L oxygen through nasal cannula without any SOB or chest pain.     Rapid Response Vital Signs:  BP: 110/69  HR: 110  RR: 17  SpO2: 100 % on 3L   Temp: 98.5  FS: 144    Vital Signs Last 24 Hrs  T(C): 36.9 (10 Oct 2018 11:45), Max: 37.2 (09 Oct 2018 23:32)  T(F): 98.5 (10 Oct 2018 11:45), Max: 98.9 (09 Oct 2018 23:32)  HR: 73 (10 Oct 2018 11:45) (61 - 95)  BP: 110/69 (10 Oct 2018 11:45) (101/65 - 118/74)  BP(mean): --  RR: 22 (10 Oct 2018 11:45) (20 - 24)  SpO2: 100% (10 Oct 2018 11:45) (95% - 100%)    Physical Exam:  General: morbidly obese, in acute distress, agitated, responds to sternal rub and painful stimuli, answered all questions.   HEENT: NCAT  Neck: Supple  Neurology: A&Ox3, nonfocal  Respiratory: CTA B/L, No W/R/R  CV: RRR, +S1/S2, no murmurs, rubs or gallops  Abdominal: Soft, NT, distended +BSx4, morbidly obese, painful when moved/ palpated pannus  Extremities: + non-pitting peripheral edema, ,+ peripheral pulses, rashes throughout body, open rash on folds of pannus skin  MSK: diminished ROM due to body habitus/ morbidly obese  Skin: warm, dry, + obvious rashes throughout body, some open under pannus, majority closed      Assessment/Plan:    72 y/o man w morbid obesity, bipolar disorder, decubitus ulcers, resident of [facility], bedbound for 2 years, being w/u for diarrhea last few weeks, stopped eating/drinking/taking meds for 6weeks, brought in to ER after being seen by his daughter with symptoms of increased lethargy/abdomen pain/diarrhea.    Rapid response was called because patient suddenly became unresponsive during his bed change. The patient was able to respond and answer questions when examined. ICU fellow at bedside.   Stat EKG, BMP, mg, phos was ordered.   Prior Ct head on 10/6 was normal. No CT head at this time.       Discussed plan with Dr Tuttle, no further recommendations.   Neurology, Dr. Madison, awaiting recs.  -Will continue to follow, RN to call if any changes. Resident Rapid Response Note    Rapid response was called at 11:23AM for Altered mental status.    Events leading up to Rapid Response: the patient was getting cleaned when he became unresponsive. The patient was difficult to arouse with sternal rub.     Patient was seen and examined at the bedside by the rapid response team and resident medicine team. ICU fellow was at bedside. Patient was awake and agitated when examined at bedside. Patient was scratching himself and the nurses as he was very irritated. Patient was on 3L oxygen through nasal cannula without any SOB or chest pain.     Rapid Response Vital Signs:  BP: 110/69  HR: 110  RR: 17  SpO2: 100 % on 3L   Temp: 98.5  FS: 144    Vital Signs Last 24 Hrs  T(C): 36.9 (10 Oct 2018 11:45), Max: 37.2 (09 Oct 2018 23:32)  T(F): 98.5 (10 Oct 2018 11:45), Max: 98.9 (09 Oct 2018 23:32)  HR: 73 (10 Oct 2018 11:45) (61 - 95)  BP: 110/69 (10 Oct 2018 11:45) (101/65 - 118/74)  BP(mean): --  RR: 22 (10 Oct 2018 11:45) (20 - 24)  SpO2: 100% (10 Oct 2018 11:45) (95% - 100%)    Physical Exam:  General: morbidly obese, in acute distress, agitated, responds to sternal rub and painful stimuli, answered all questions.   HEENT: NCAT  Neck: Supple  Neurology: A&Ox3, nonfocal  Respiratory: CTA B/L, No W/R/R  CV: RRR, +S1/S2, no murmurs, rubs or gallops  Abdominal: Soft, NT, distended +BSx4, morbidly obese, painful when moved/ palpated pannus  Extremities: + non-pitting peripheral edema, ,+ peripheral pulses, rashes throughout body, open rash on folds of pannus skin  MSK: diminished ROM due to body habitus/ morbidly obese  Skin: warm, dry, + obvious rashes throughout body, some open under pannus, majority closed      Assessment/Plan:    72 y/o man w morbid obesity, bipolar disorder, decubitus ulcers, resident of [facility], bedbound for 2 years, being w/u for diarrhea last few weeks, stopped eating/drinking/taking meds for 6weeks, brought in to ER after being seen by his daughter with symptoms of increased lethargy/abdomen pain/diarrhea.    Rapid response was called because patient suddenly became unresponsive during his bed change. The patient was able to respond and answer questions when examined. ICU fellow at bedside.   Stat EKG, BMP, mg, phos was ordered.   Neurology, Dr. Madison made aware. As per Dr. Madison, order head CT non-contrast. Prior CT head on 10/6 was normal.   Discussed plan with Dr Tuttle, no further recommendations.   -Will continue to follow, RN to call if any changes.

## 2018-10-10 NOTE — PROGRESS NOTE ADULT - ASSESSMENT
72 y/o man w morbid obesity, bipolar disorder, decubitus ulcers, resident of [facility], bedbound for 2 years, being w/u for diarrhea last few weeks, stopped eating/drinking/taking meds for 6weeks, brought in to ER after being seen by his daughter with symptoms of increased lethargy/abdomen pain/diarrhea. Was seen in [facility] 9/30 for similar findings, CT c/a/p only sig for enlarged prostate.  CT head no acute findings, CT a/p w bibasilar pneumonia.  Seen by Neuro/Cardiology/ID/Pulm/Psych, started on antibiotics    noted to have precipitous drop in H/H since 9/30(from 11.3 to 9.3 on  10/5 and 7.5 on 10/6) with peripheral blood smear morphology no sig pathology  C diff negative but stool is guaiac positive  CT a/p no hematoma  Post 1 unit PRBC on admission w appropriate incr of H/H, drop of Hb to 6.9 today , ordered 1 pRBcs   iron studies c/w acute illness/inflammation  w/u severe B12 and folate deficiency  off Coumadin since admission    -  continue B12 and folate repletion, follow serial CBC  -mild thrombocytopenia likely due to B12/folate deficiency, adequate at present level, continue serial monitoring  -GI is considering workup if HCP agrees and when medically optimized 74 y/o man w morbid obesity, bipolar disorder, decubitus ulcers, resident of [facility], bedbound for 2 years, being w/u for diarrhea last few weeks, stopped eating/drinking/taking meds for 6weeks, brought in to ER after being seen by his daughter with symptoms of increased lethargy/abdomen pain/diarrhea. Was seen in [facility] 9/30 for similar findings, CT c/a/p only sig for enlarged prostate.  CT head no acute findings, CT a/p w bibasilar pneumonia.  Seen by Neuro/Cardiology/ID/Pulm/Psych, started on antibiotics    noted to have precipitous drop in H/H since 9/30(from 11.3 to 9.3 on  10/5 and 7.5 on 10/6) with peripheral blood smear morphology no sig pathology  C diff negative but stool is guaiac positive  CT a/p no hematoma  Post 1 unit PRBC on admission w appropriate incr of H/H, drop of Hb to 6.9 today , ordered 1 pRBcs   iron studies c/w acute illness/inflammation  w/u severe B12 and folate deficiency  off Coumadin since admission    -continue B12 and folate repletion, follow serial CBC  -mild thrombocytopenia likely due to B12/folate deficiency, adequate at present level, continue serial monitoring  -GI is considering workup if HCP agrees and when medically optimized     rapid response team is at bedside   CT head, repeat LW is ordered including CMP, coags , CBC , cardiac enzymes   blood transfusion was not started yet

## 2018-10-10 NOTE — PROGRESS NOTE ADULT - SUBJECTIVE AND OBJECTIVE BOX
ID Progress note     Name: TIMOTEO PEDERSEN  Age: 73y  Gender: Male  MRN: [id number]    Interval History-- No new events, remains confused , noted to have drop in Hg./ HCT , being followed by GI . Afebrile . Urine out improved . Oral intake is poor   Notes reviewed    Past Medical History--  Myocardial infarct, old  Gastroesophageal reflux disease, esophagitis presence not specified  Other pulmonary embolism with acute cor pulmonale, unspecified chronicity  Acute congestive heart failure, unspecified heart failure type  Cellulitis, unspecified cellulitis site  Candida infection  Acute congestive heart failure, unspecified heart failure type  Rheumatoid arthritis with positive rheumatoid factor, involving unspecified site  Primary osteoarthritis, unspecified site  Chronic atrial fibrillation  Bipolar 1 disorder  Angina at rest  COPD mixed type  Essential hypertension  Morbid obesity      For details regarding the patient's social history, family history, and other miscellaneous elements, please refer the initial infectious diseases consultation and/or the admitting history and physical examination for this admission.    Allergies--  Allergies    penicillin (Unknown)  strawberry (Unknown)    Intolerances        Medications--  Antibiotics:    Immunologic:    Other:  ALBUTerol    0.083%  buDESOnide   0.5 milliGRAM(s) Respule  cholestyramine Powder (Sugar-Free)  cyanocobalamin Injectable  famotidine    Tablet  folic acid  lactobacillus acidophilus  morphine  - Injectable PRN  multivitamin  nystatin Powder  OLANZapine Injectable PRN  ondansetron Injectable PRN  promethazine Suppository PRN  QUEtiapine  QUEtiapine  sodium bicarbonate  sodium chloride 0.65% Nasal PRN  tiotropium 18 MICROgram(s) Capsule  venlafaxine XR.      Review of Systems--  Review of systems unable to be obtained secondary to clinical condition.    Physical Examination--    Vital Signs: T(F): 97.4 (10-10-18 @ 05:42), Max: 98.9 (10-09-18 @ 23:32)  HR: 86 (10-10-18 @ 05:42)  BP: 101/65 (10-10-18 @ 05:42)  RR: 20 (10-10-18 @ 05:42)  SpO2: 100% (10-10-18 @ 05:42)      Wt(kg): --  General: Morbidly obese Nontoxic-appearing Male in no acute distress.  HEENT: AT/NC. PERRL. EOMI. Anicteric. Conjunctiva pink and moist. Oropharynx clear. Dentition fair.  Neck: Not rigid. No sense of mass.  Nodes: None palpable.  Lungs: Clear bilaterally without rales, wheezing or rhonchi  Heart: Regular rate and rhythm. No Murmur. No rub. No gallop. No palpable thrill.  Abdomen: Bowel sounds present and normoactive. Soft. obese, large pannus . multiple superficial skin  wounds .  Back: No spinal tenderness. No costovertebral angle tenderness.   Extremities: No cyanosis or clubbing. No edema.   Skin: Warm. Dry.  on buttocks DTI 30 cm x 30 cm , has superficial maceration in the skin folds   Psychiatric: Appropriate affect and mood for situation.     Laboratory Studies--  CBC                        7.1    x     )-----------( x        ( 09 Oct 2018 17:05 )             22.6       Chemistries          Culture Data    Culture - Urine (collected 06 Oct 2018 20:52)  Source: .Urine Catheterized  Final Report (07 Oct 2018 23:12):    No growth    Culture - Blood (collected 05 Oct 2018 21:31)  Source: .Blood Blood  Preliminary Report (06 Oct 2018 22:01):    No growth to date.    Culture - Blood (collected 05 Oct 2018 21:31)  Source: .Blood Blood  Preliminary Report (06 Oct 2018 22:01):    No growth to date.    Assessment--  73 year old male hx of morbid obesity, bipolar disorder a LTC resident of SN[facility], bed bound for over 2 years sent in to ER with complaints of increasing SOB, abdominal pains and diarrhea and increased lethargy , noted to be obtunded and poorly responsive, ABG does not show hypercapnia and ct head was negative . he is afebrile and has normal WBC so doubt its all from infection. he most likely has metabolic encephalopathy. he could have a CNS event although ct head negative . He has elevated P BNP but no evidence of CHF as per cardiology . He has delirium or metabolic encephalopathy . He could also have AMS secondary to his underlying psychiatric illness    He has multiple open wounds and bilateral unstageable DU on both buttocks , don't appears to be grossly infected      he has persistent drop in hg/ Hct  with guaiac positive stool may need endoscopy     Doubt he has any active infection     He appears volume depleted     Plan :   - will observe off  antibiotics as no evidence of active infection   - trend labs   - wound care as per wound care RN , need to change position frequently   - psychiatry follow up   - continue with IVF   - may need appetite stimulants     Continue with present regime .  Appropriate use of antibiotics and adverse effects reviewed.      I have discussed the above plan of care with patient's family in detail. They expressed understanding of the treatment plan . Risks, benefits and alternatives discussed in detail. I have asked if they have any questions or concerns and appropriately addressed them to the best of my ability .      > 25 minutes spent in direct patient care reviewing  the notes, lab data/ imaging , discussion with multidisciplinary team. All questions were addressed and answered to the best of my capacity .    Thank you for allowing me to participate in the care of your patient .        Derrek Corbin MD  [phone]

## 2018-10-10 NOTE — PROGRESS NOTE ADULT - SUBJECTIVE AND OBJECTIVE BOX
[facility] -- Madai Farah, Kalyan Gonzalez, Paul Wiley Savella  Office # [phone]    Follow Up: Acute on chronic systolic HF    Subjective/Observations:       REVIEW OF SYSTEMS: All other review of systems is negative unless indicated above    PAST MEDICAL & SURGICAL HISTORY:  Myocardial infarct, old  Gastroesophageal reflux disease, esophagitis presence not specified  Other pulmonary embolism with acute cor pulmonale, unspecified chronicity  Acute congestive heart failure, unspecified heart failure type  Cellulitis, unspecified cellulitis site: abdomen buttocks  Candida infection  Acute congestive heart failure, unspecified heart failure type  Rheumatoid arthritis with positive rheumatoid factor, involving unspecified site  Primary osteoarthritis, unspecified site  Chronic atrial fibrillation  Bipolar 1 disorder  Angina at rest  COPD mixed type  Essential hypertension  Morbid obesity    MEDICATIONS  (STANDING):  ALBUTerol    0.083% 2.5 milliGRAM(s) Nebulizer every 6 hours  buDESOnide   0.5 milliGRAM(s) Respule 0.5 milliGRAM(s) Inhalation every 12 hours  cyanocobalamin Injectable 1000 MICROGram(s) IntraMuscular daily  famotidine    Tablet 20 milliGRAM(s) Oral two times a day  folic acid Injectable 1 milliGRAM(s) IV Push daily  lactobacillus acidophilus 1 Tablet(s) Oral three times a day with meals  multivitamin 1 Tablet(s) Oral daily  nystatin Powder 1 Application(s) Topical two times a day  QUEtiapine 100 milliGRAM(s) Oral daily  QUEtiapine 100 milliGRAM(s) Oral at bedtime  sodium bicarbonate 650 milliGRAM(s) Oral three times a day  tiotropium 18 MICROgram(s) Capsule 1 Capsule(s) Inhalation daily  venlafaxine XR. 150 milliGRAM(s) Oral daily    MEDICATIONS  (PRN):  morphine  - Injectable 2 milliGRAM(s) IV Push every 4 hours PRN Mild Pain (1 - 3)  OLANZapine Injectable 10 milliGRAM(s) IntraMuscular every 6 hours PRN Acute agitation  ondansetron Injectable 4 milliGRAM(s) IV Push every 6 hours PRN Nausea  promethazine Suppository 12.5 milliGRAM(s) Rectal four times a day PRN breakthrough nausea / vomiting  sodium chloride 0.65% Nasal 1 Spray(s) Both Nostrils four times a day PRN Nasal Congestion    Allergies    penicillin (Unknown)  strawberry (Unknown)    Intolerances    Vital Signs Last 24 Hrs  T(C): 36.9 (10 Oct 2018 11:45), Max: 37.2 (09 Oct 2018 23:32)  T(F): 98.5 (10 Oct 2018 11:45), Max: 98.9 (09 Oct 2018 23:32)  HR: 73 (10 Oct 2018 11:45) (61 - 95)  BP: 110/69 (10 Oct 2018 11:45) (101/65 - 118/74)  BP(mean): --  RR: 22 (10 Oct 2018 11:45) (20 - 24)  SpO2: 100% (10 Oct 2018 11:45) (95% - 100%)    I&O's Summary    09 Oct 2018 07:01  -  10 Oct 2018 07:00  --------------------------------------------------------  IN: 200 mL / OUT: 850 mL / NET: -650 mL    10 Oct 2018 07:01  -  10 Oct 2018 14:10  --------------------------------------------------------  IN: 0 mL / OUT: 400 mL / NET: -400 mL    PHYSICAL EXAM:  TELE: Not on tele  Constitutional: NAD, awake and alert, morbidly obese  HEENT: Moist Mucous Membranes, Anicteric  Pulmonary: Non-labored, breath sounds are diminished bilaterally, No wheezing, rales or rhonchi  Cardiovascular: Regular, S1 and S2, No murmurs, rubs, gallops or clicks  Gastrointestinal: Bowel Sounds present, soft, nontender.   Lymph: No peripheral edema. No lymphadenopathy.  Skin: No visible rashes or ulcers.  Psych:  Mood & affect appropriate    LABS: All Labs Reviewed:                        7.7    4.47  )-----------( 106      ( 10 Oct 2018 11:59 )             24.5                         6.9    3.47  )-----------( 84       ( 10 Oct 2018 07:36 )             21.8                         7.1    x     )-----------( x        ( 09 Oct 2018 17:05 )             22.6     10 Oct 2018 11:59    149    |  119    |  26     ----------------------------<  135    4.4     |  23     |  1.40   10 Oct 2018 07:36    149    |  119    |  26     ----------------------------<  123    3.9     |  22     |  1.10   08 Oct 2018 07:09    146    |  117    |  29     ----------------------------<  148    3.9     |  19     |  1.30     Ca    7.6        10 Oct 2018 11:59  Ca    7.3        10 Oct 2018 07:36  Ca    7.7        08 Oct 2018 07:09  Phos  1.9       10 Oct 2018 11:59  Mg     2.3       10 Oct 2018 11:59    TPro  6.2    /  Alb  2.3    /  TBili  0.6    /  DBili  x      /  AST  18     /  ALT  17     /  AlkPhos  81     10 Oct 2018 11:59    PT/INR - ( 10 Oct 2018 11:59 )   PT: 25.8 sec;   INR: 2.33 ratio      CARDIAC MARKERS ( 10 Oct 2018 11:59 )  <.015 ng/mL / x     / 130 U/L / x     / 4.2 ng/mL [facility] -- Madai Farah, Kalyan Gonzalez, Paul Wiley Savella  Office # [phone]    Follow Up: htn, cad, hf, altered ms    Subjective/Observations: Denies SOB or orthopnea.  Denies CP or palpitations.  Denies dizziness or lightheadedness    REVIEW OF SYSTEMS: All other review of systems is negative unless indicated above    PAST MEDICAL & SURGICAL HISTORY:  Myocardial infarct, old  Gastroesophageal reflux disease, esophagitis presence not specified  Other pulmonary embolism with acute cor pulmonale, unspecified chronicity  Acute congestive heart failure, unspecified heart failure type  Cellulitis, unspecified cellulitis site: abdomen buttocks  Candida infection  Acute congestive heart failure, unspecified heart failure type  Rheumatoid arthritis with positive rheumatoid factor, involving unspecified site  Primary osteoarthritis, unspecified site  Chronic atrial fibrillation  Bipolar 1 disorder  Angina at rest  COPD mixed type  Essential hypertension  Morbid obesity    MEDICATIONS  (STANDING):  ALBUTerol    0.083% 2.5 milliGRAM(s) Nebulizer every 6 hours  buDESOnide   0.5 milliGRAM(s) Respule 0.5 milliGRAM(s) Inhalation every 12 hours  cyanocobalamin Injectable 1000 MICROGram(s) IntraMuscular daily  famotidine    Tablet 20 milliGRAM(s) Oral two times a day  folic acid Injectable 1 milliGRAM(s) IV Push daily  lactobacillus acidophilus 1 Tablet(s) Oral three times a day with meals  multivitamin 1 Tablet(s) Oral daily  nystatin Powder 1 Application(s) Topical two times a day  QUEtiapine 100 milliGRAM(s) Oral daily  QUEtiapine 100 milliGRAM(s) Oral at bedtime  sodium bicarbonate 650 milliGRAM(s) Oral three times a day  tiotropium 18 MICROgram(s) Capsule 1 Capsule(s) Inhalation daily  venlafaxine XR. 150 milliGRAM(s) Oral daily    MEDICATIONS  (PRN):  morphine  - Injectable 2 milliGRAM(s) IV Push every 4 hours PRN Mild Pain (1 - 3)  OLANZapine Injectable 10 milliGRAM(s) IntraMuscular every 6 hours PRN Acute agitation  ondansetron Injectable 4 milliGRAM(s) IV Push every 6 hours PRN Nausea  promethazine Suppository 12.5 milliGRAM(s) Rectal four times a day PRN breakthrough nausea / vomiting  sodium chloride 0.65% Nasal 1 Spray(s) Both Nostrils four times a day PRN Nasal Congestion    Allergies    penicillin (Unknown)  strawberry (Unknown)    Intolerances    Vital Signs Last 24 Hrs  T(C): 36.9 (10 Oct 2018 11:45), Max: 37.2 (09 Oct 2018 23:32)  T(F): 98.5 (10 Oct 2018 11:45), Max: 98.9 (09 Oct 2018 23:32)  HR: 73 (10 Oct 2018 11:45) (61 - 95)  BP: 110/69 (10 Oct 2018 11:45) (101/65 - 118/74)  BP(mean): --  RR: 22 (10 Oct 2018 11:45) (20 - 24)  SpO2: 100% (10 Oct 2018 11:45) (95% - 100%)    I&O's Summary    09 Oct 2018 07:01  -  10 Oct 2018 07:00  --------------------------------------------------------  IN: 200 mL / OUT: 850 mL / NET: -650 mL    10 Oct 2018 07:01  -  10 Oct 2018 14:10  --------------------------------------------------------  IN: 0 mL / OUT: 400 mL / NET: -400 mL    PHYSICAL EXAM:  TELE: Not on tele  Constitutional: NAD, awake and alert, morbidly obese  HEENT: Moist Mucous Membranes, Anicteric  Pulmonary: Non-labored, breath sounds are diminished bilaterally, No wheezing, rales or rhonchi  Cardiovascular: Regular, S1 and S2, +murmurs, rubs, gallops or clicks  Gastrointestinal: Bowel Sounds present, soft, nontender.   : curtis in situ  Lymph: No peripheral edema. No lymphadenopathy.  Skin: + dry ulcers BLE  Psych:  Mood & affect appropriate    LABS: All Labs Reviewed:                        7.7    4.47  )-----------( 106      ( 10 Oct 2018 11:59 )             24.5                         6.9    3.47  )-----------( 84       ( 10 Oct 2018 07:36 )             21.8                         7.1    x     )-----------( x        ( 09 Oct 2018 17:05 )             22.6     10 Oct 2018 11:59    149    |  119    |  26     ----------------------------<  135    4.4     |  23     |  1.40   10 Oct 2018 07:36    149    |  119    |  26     ----------------------------<  123    3.9     |  22     |  1.10   08 Oct 2018 07:09    146    |  117    |  29     ----------------------------<  148    3.9     |  19     |  1.30     Ca    7.6        10 Oct 2018 11:59  Ca    7.3        10 Oct 2018 07:36  Ca    7.7        08 Oct 2018 07:09  Phos  1.9       10 Oct 2018 11:59  Mg     2.3       10 Oct 2018 11:59    TPro  6.2    /  Alb  2.3    /  TBili  0.6    /  DBili  x      /  AST  18     /  ALT  17     /  AlkPhos  81     10 Oct 2018 11:59    PT/INR - ( 10 Oct 2018 11:59 )   PT: 25.8 sec;   INR: 2.33 ratio      CARDIAC MARKERS ( 10 Oct 2018 11:59 )  <.015 ng/mL / x     / 130 U/L / x     / 4.2 ng/mL    < from: TTE Echo Doppler w/o Cont (10.09.18 @ 15:16) >     EXAM:  ECHO TTE W/O CON COMP W/DOPPLR       PROCEDURE DATE:  10/09/2018      INTERPRETATION:  INDICATION: Abnormal EKG  Referring M.D.:Alamuri  Blood Pressure 110/60        Weight (kg) :181     Height (cm):172       BSA (sq m): 2.74  Technician: KATERINE    Dimensions:    LA 4.2       Normal Values: 2.0 - 4.0 cm    Ao 3.7        Normal Values: 2.0 - 3.8 cm  SEPTUM 1.3       Normal Values: 0.6 - 1.2 cm  PWT 1.3       Normal Values: 0.6 - 1.1 cm  LVIDd 4.0         Normal Values: 3.0 - 5.6 cm  LVIDs 3.2         Normal Values: 1.8 - 4.0 cm    OBSERVATIONS:    Mitral Valve: Mitral annular calcification with mild mitral regurgitation  Aortic Valve/Aorta: Mild aortic stenosis with a peak gradient of 23 mm in   mean gradient of 12 mm  Tricuspid Valve: Normal tricuspid valve. Trace     tricuspid   regurgitation.  Pulmonic Valve: The pulmonic valve is not well visualized. Probably   normal.  Left Atrium: Mild dilatation  Right Atrium: Normal  Left Ventricle: The endocardium is not well-visualized but there appears   to be mild left ventricular hypertrophy with normal global left   ventricular systolic function. The EF is approximately 50%.  Right Ventricle: Normal right ventricular size and function.  Pericardium/Pleura: No pericardial effusion noted.  Pulmonary/RV Pressure: The right ventricular systolic pressure is   estimated to be normal  LV Diastolic Function: Doppler evidence suggestive of diastolic   dysfunction    Conclusion: Technically limited study secondary to the patient's body   habitus and clinical condition   1 the endocardium is not well-visualized but there appears to be normal   global left ventricular systolic function with mild left ventricular   hypertrophy  2. Mitral annular calcification with mild mitral regurgitation  3. Mild aortic stenosis with a peak gradient of 23 mm in mean gradient of   12 mm  4. Doppler evidence suggestive of diastolic dysfunction      ALBERTO GONZALEZ M.D., ATTENDING CARDIOLOGIST  This document has been electronically signed. Oct  9 2018  5:10PM      < end of copied text >     < from: Xray Chest 1 View- PORTABLE-Routine (10.08.18 @ 08:54) >    EXAM:  XR CHEST PORTABLE ROUTINE 1V                          PROCEDURE DATE:  10/08/2018      INTERPRETATION:      INDICATION: Assess for pneumonia    Single frontal view of the chest compared to prior study of 10/6/2018    FINDINGS/  IMPRESSION:       There appears to be multifocal airspace opacities without focal   consolidation, probable mild congestive changes. There is no pleural   effusion or pneumothorax.  The cardiac silhouette is within normal   limits.  There is  degenerative changes. If symptoms persist, and   additional imaging evaluation is needed, follow-up CT is suggested.    KEVIN CEBALLOS M.D., ATTENDING RADIOLOGIST  This document has been electronically signed. Oct  8 2018  9:54AM      < end of copied text >

## 2018-10-11 NOTE — PROGRESS NOTE ADULT - ASSESSMENT
·	Prerenal azotemia, CKD 3  ·	Anemia  ·	Abd pain, Diarrhea    Poor PO intake. Family deciding on comfort care. To continue current meds.   Overall prognosis poor. Family aware. D/w family at bedside.   Will sign off. Please recall if needed.

## 2018-10-11 NOTE — PROGRESS NOTE ADULT - PROBLEM SELECTOR PLAN 1
copd regimen, nebs and spiriva, would dc Pulmicort  oral hygiene  skin care  cvs regimen and BP control  mental status better  dietary discretion  assist with ADL  monitor vs and HD and Sat  attempt out of bed   pt is DNR DNI  prognosis guarded  dc planning, will need RONI  serial labs  serial PE

## 2018-10-11 NOTE — PROGRESS NOTE ADULT - SUBJECTIVE AND OBJECTIVE BOX
Interval History:  nurses report bleeding from wounds    Chart reviewed and events noted;   Overnight events:    MEDICATIONS  (STANDING):  ALBUTerol    0.083% 2.5 milliGRAM(s) Nebulizer every 6 hours  morphine  Infusion 0.5 mG/Hr (0.5 mL/Hr) IV Continuous <Continuous>  nystatin Powder 1 Application(s) Topical two times a day  QUEtiapine 100 milliGRAM(s) Oral daily  QUEtiapine 100 milliGRAM(s) Oral at bedtime  venlafaxine XR. 150 milliGRAM(s) Oral daily    MEDICATIONS  (PRN):  aluminum hydroxide/magnesium hydroxide/simethicone Suspension 30 milliLiter(s) Oral every 6 hours PRN Dyspepsia  artificial  tears Solution 1 Drop(s) Both EYES four times a day PRN Dry Eyes  atropine 1% Ophthalmic Solution for SubLingual Use 2 Drop(s) SubLingual four times a day PRN oral secretions  bisacodyl 5 milliGRAM(s) Oral every 12 hours PRN Constipation  bisacodyl Suppository 10 milliGRAM(s) Rectal daily PRN Constipation  HYDROmorphone  Injectable 1 milliGRAM(s) IV Push every 90 minutes PRN distress, pain, dyspnea,  LORazepam   Injectable 1 milliGRAM(s) IV Push every 2 hours PRN dyspnea, distress, anxiety  ondansetron Injectable 4 milliGRAM(s) IV Push every 6 hours PRN Nausea  polyethylene glycol 3350 17 Gram(s) Oral daily PRN Constipation  promethazine Suppository 12.5 milliGRAM(s) Rectal four times a day PRN breakthrough nausea / vomiting  sodium chloride 0.65% Nasal 1 Spray(s) Both Nostrils four times a day PRN Nasal Congestion      Vital Signs Last 24 Hrs  T(C): 37.1 (11 Oct 2018 08:00), Max: 37.1 (11 Oct 2018 08:00)  T(F): 98.7 (11 Oct 2018 08:00), Max: 98.7 (11 Oct 2018 08:00)  HR: 78 (11 Oct 2018 08:00) (59 - 96)  BP: 112/67 (11 Oct 2018 08:00) (98/61 - 112/67)  BP(mean): --  RR: 20 (11 Oct 2018 08:00) (19 - 22)  SpO2: 97% (11 Oct 2018 08:00) (95% - 100%)    PHYSICAL EXAM    GENERAL:  adult in NAD, chronically ill apearing, mobidly obese  HEENT: clear oropharynx, anicteric sclera, pink conjunctivae  Neck: supple  CV: normal S1S2 with no murmur rubs or gallops  Lungs: clear to auscultation, no wheezes, no rhales  Abdomen: soft non-tender non-distended, no hepato/splenomegaly  Ext: no clubbing cyanosis+2-3+ edema  Skin: no rashes and no petichiae  Neuro: weak and lethargic      LABS:  CBC Full  -  ( 11 Oct 2018 10:12 )  WBC Count : x  Hemoglobin : 6.9 g/dL  Hematocrit : 21.8 %  Platelet Count - Automated : x  Mean Cell Volume : x  Mean Cell Hemoglobin : x  Mean Cell Hemoglobin Concentration : x  Auto Neutrophil # : x  Auto Lymphocyte # : x  Auto Monocyte # : x  Auto Eosinophil # : x  Auto Basophil # : x  Auto Neutrophil % : x  Auto Lymphocyte % : x  Auto Monocyte % : x  Auto Eosinophil % : x  Auto Basophil % : x    10-10    149<H>  |  119<H>  |  26<H>  ----------------------------<  135<H>  4.4   |  23  |  1.40<H>    Ca    7.6<L>      10 Oct 2018 11:59  Phos  1.9     10-10  Mg     2.3     10-10    TPro  6.2  /  Alb  2.3<L>  /  TBili  0.6  /  DBili  x   /  AST  18  /  ALT  17  /  AlkPhos  81  10-10    PT/INR - ( 11 Oct 2018 07:08 )   PT: 28.7 sec;   INR: 2.58 ratio             fe studies      WBC trend  4.47 K/uL (10-10-18 @ 11:59)  3.47 K/uL (10-10-18 @ 07:36)      Hgb trend  6.9 g/dL (10-11-18 @ 10:12)  7.7 g/dL (10-10-18 @ 11:59)  6.9 g/dL (10-10-18 @ 07:36)  7.1 g/dL (10-09-18 @ 17:05)      plt trend  106 K/uL (10-10-18 @ 11:59)  84 K/uL (10-10-18 @ 07:36)        RADIOLOGY & ADDITIONAL STUDIES:

## 2018-10-11 NOTE — PROGRESS NOTE ADULT - SUBJECTIVE AND OBJECTIVE BOX
Date/Time Patient Seen:  		  Referring MD:   Data Reviewed	       Patient is a 73y old  Male who presents with a chief complaint of not feeling well (10 Oct 2018 18:08)  in bed  seen and examined  vs and meds reviewed      Subjective/HPI     PAST MEDICAL & SURGICAL HISTORY:  Myocardial infarct, old  Gastroesophageal reflux disease, esophagitis presence not specified  Other pulmonary embolism with acute cor pulmonale, unspecified chronicity  Acute congestive heart failure, unspecified heart failure type  Cellulitis, unspecified cellulitis site: abdomen buttocks  Candida infection  Acute congestive heart failure, unspecified heart failure type  Rheumatoid arthritis with positive rheumatoid factor, involving unspecified site  Primary osteoarthritis, unspecified site  Chronic atrial fibrillation  Bipolar 1 disorder  Angina at rest  COPD mixed type  Essential hypertension  Morbid obesity        Medication list         MEDICATIONS  (STANDING):  ALBUTerol    0.083% 2.5 milliGRAM(s) Nebulizer every 6 hours  buDESOnide   0.5 milliGRAM(s) Respule 0.5 milliGRAM(s) Inhalation every 12 hours  cyanocobalamin Injectable 1000 MICROGram(s) IntraMuscular daily  folic acid Injectable 1 milliGRAM(s) IV Push daily  lactobacillus acidophilus 1 Tablet(s) Oral three times a day with meals  multivitamin 1 Tablet(s) Oral daily  nystatin Powder 1 Application(s) Topical two times a day  pantoprazole  Injectable 40 milliGRAM(s) IV Push daily  QUEtiapine 100 milliGRAM(s) Oral daily  QUEtiapine 100 milliGRAM(s) Oral at bedtime  sodium bicarbonate 650 milliGRAM(s) Oral three times a day  tiotropium 18 MICROgram(s) Capsule 1 Capsule(s) Inhalation daily  venlafaxine XR. 150 milliGRAM(s) Oral daily    MEDICATIONS  (PRN):  morphine  - Injectable 2 milliGRAM(s) IV Push every 4 hours PRN Mild Pain (1 - 3)  OLANZapine Injectable 10 milliGRAM(s) IntraMuscular every 6 hours PRN Acute agitation  ondansetron Injectable 4 milliGRAM(s) IV Push every 6 hours PRN Nausea  promethazine Suppository 12.5 milliGRAM(s) Rectal four times a day PRN breakthrough nausea / vomiting  sodium chloride 0.65% Nasal 1 Spray(s) Both Nostrils four times a day PRN Nasal Congestion         Vitals log        ICU Vital Signs Last 24 Hrs  T(C): 37 (11 Oct 2018 04:45), Max: 37 (10 Oct 2018 15:46)  T(F): 98.6 (11 Oct 2018 04:45), Max: 98.6 (10 Oct 2018 15:46)  HR: 88 (11 Oct 2018 04:45) (59 - 96)  BP: 98/61 (11 Oct 2018 04:45) (98/61 - 110/69)  BP(mean): --  ABP: --  ABP(mean): --  RR: 19 (11 Oct 2018 04:45) (19 - 24)  SpO2: 97% (11 Oct 2018 04:45) (95% - 100%)           Input and Output:  I&O's Detail    09 Oct 2018 07:01  -  10 Oct 2018 07:00  --------------------------------------------------------  IN:    Oral Fluid: 200 mL  Total IN: 200 mL    OUT:    Indwelling Catheter - Urethral: 850 mL  Total OUT: 850 mL    Total NET: -650 mL      10 Oct 2018 07:01  -  11 Oct 2018 06:46  --------------------------------------------------------  IN:  Total IN: 0 mL    OUT:    Indwelling Catheter - Urethral: 800 mL  Total OUT: 800 mL    Total NET: -800 mL          Lab Data                        7.7    4.47  )-----------( 106      ( 10 Oct 2018 11:59 )             24.5     10-10    149<H>  |  119<H>  |  26<H>  ----------------------------<  135<H>  4.4   |  23  |  1.40<H>    Ca    7.6<L>      10 Oct 2018 11:59  Phos  1.9     10-10  Mg     2.3     10-10    TPro  6.2  /  Alb  2.3<L>  /  TBili  0.6  /  DBili  x   /  AST  18  /  ALT  17  /  AlkPhos  81  10-10    ABG - ( 10 Oct 2018 11:36 )  pH, Arterial: 7.34  pH, Blood: x     /  pCO2: 37    /  pO2: 89    / HCO3: 20    / Base Excess: -5.5  /  SaO2: 97                CARDIAC MARKERS ( 10 Oct 2018 19:18 )  <.015 ng/mL / x     / 167 U/L / x     / 4.4 ng/mL  CARDIAC MARKERS ( 10 Oct 2018 11:59 )  <.015 ng/mL / x     / 130 U/L / x     / 4.2 ng/mL        Review of Systems	      Objective     Physical Examination    heart s1s2  lung dec BS  abd soft  obese  verbal  on o2 support    Pertinent Lab findings & Imaging      Harjinder:  NO   Adequate UO     I&O's Detail    09 Oct 2018 07:01  -  10 Oct 2018 07:00  --------------------------------------------------------  IN:    Oral Fluid: 200 mL  Total IN: 200 mL    OUT:    Indwelling Catheter - Urethral: 850 mL  Total OUT: 850 mL    Total NET: -650 mL      10 Oct 2018 07:01  -  11 Oct 2018 06:46  --------------------------------------------------------  IN:  Total IN: 0 mL    OUT:    Indwelling Catheter - Urethral: 800 mL  Total OUT: 800 mL    Total NET: -800 mL               Discussed with:     Cultures:	        Radiology

## 2018-10-11 NOTE — PROGRESS NOTE ADULT - SUBJECTIVE AND OBJECTIVE BOX
Patient is a 73y old  Male who presents with a chief complaint of not feeling well (11 Oct 2018 16:48)      INTERVAL /OVERNIGHT EVENTS: alert awake confused    MEDICATIONS  (STANDING):  acetaminophen   Tablet .. 650 milliGRAM(s) Oral once  ALBUTerol    0.083% 2.5 milliGRAM(s) Nebulizer every 6 hours  diphenhydrAMINE 25 milliGRAM(s) Oral once  morphine  Infusion 0.5 mG/Hr (0.5 mL/Hr) IV Continuous <Continuous>  nystatin Powder 1 Application(s) Topical two times a day  QUEtiapine 100 milliGRAM(s) Oral daily  QUEtiapine 100 milliGRAM(s) Oral at bedtime  venlafaxine XR. 150 milliGRAM(s) Oral daily    MEDICATIONS  (PRN):  aluminum hydroxide/magnesium hydroxide/simethicone Suspension 30 milliLiter(s) Oral every 6 hours PRN Dyspepsia  artificial  tears Solution 1 Drop(s) Both EYES four times a day PRN Dry Eyes  atropine 1% Ophthalmic Solution for SubLingual Use 2 Drop(s) SubLingual four times a day PRN oral secretions  bisacodyl 5 milliGRAM(s) Oral every 12 hours PRN Constipation  bisacodyl Suppository 10 milliGRAM(s) Rectal daily PRN Constipation  HYDROmorphone  Injectable 1 milliGRAM(s) IV Push every 90 minutes PRN distress, pain, dyspnea,  LORazepam   Injectable 1 milliGRAM(s) IV Push every 2 hours PRN dyspnea, distress, anxiety  ondansetron Injectable 4 milliGRAM(s) IV Push every 6 hours PRN Nausea  polyethylene glycol 3350 17 Gram(s) Oral daily PRN Constipation  promethazine Suppository 12.5 milliGRAM(s) Rectal four times a day PRN breakthrough nausea / vomiting  sodium chloride 0.65% Nasal 1 Spray(s) Both Nostrils four times a day PRN Nasal Congestion      Allergies    penicillin (Unknown)  strawberry (Unknown)    Intolerances        REVIEW OF SYSTEMS: unable to obtain    Vital Signs Last 24 Hrs  T(C): 36.9 (11 Oct 2018 15:58), Max: 37.1 (11 Oct 2018 08:00)  T(F): 98.4 (11 Oct 2018 15:58), Max: 98.7 (11 Oct 2018 08:00)  HR: 90 (11 Oct 2018 15:58) (78 - 96)  BP: 129/81 (11 Oct 2018 15:58) (98/61 - 129/81)  BP(mean): --  RR: 20 (11 Oct 2018 15:58) (19 - 20)  SpO2: 93% (11 Oct 2018 15:58) (93% - 99%)    PHYSICAL EXAM:  GENERAL: NAD, well-groomed, well-developed  HEAD:  Atraumatic, Normocephalic  EYES: EOMI, PERRLA, conjunctiva and sclera clear  ENMT: No tonsillar erythema, exudates, or enlargement; Moist mucous membranes, Good dentition, No lesions  NECK: Supple, No JVD, Normal thyroid  NERVOUS SYSTEM:  Alert & awake, Motor Strength 5/5 B/L upper and lower extremities; DTRs 2+ intact and symmetric  CHEST/LUNG: Clear to auscultation bilaterally; No rales, rhonchi, wheezing, or rubs  HEART: Regular rate and rhythm; No murmurs, rubs, or gallops  ABDOMEN: Soft, Nontender, Nondistended; Bowel sounds present  EXTREMITIES:  2+ Peripheral Pulses, No clubbing, cyanosis, or edema  LYMPH: No lymphadenopathy noted  SKIN: No rashes or lesions    LABS:                        6.9    x     )-----------( x        ( 11 Oct 2018 10:12 )             21.8       Ca    7.6        10 Oct 2018 11:59      PT/INR - ( 11 Oct 2018 07:08 )   PT: 28.7 sec;   INR: 2.58 ratio             CAPILLARY BLOOD GLUCOSE          RADIOLOGY & ADDITIONAL TESTS:    Notes Reviewed:  [x ] YES  [ ] NO    Care Discussed with Consultants/Other Providers [x ] YES  [ ] NO

## 2018-10-11 NOTE — PROGRESS NOTE ADULT - PROBLEM SELECTOR PLAN 2
no new labs to assess  no s/s active gib, per nursing pt with noted bleeding from areas of skin breakdown  prior labs pt pancytopenic, elev inr  if high suspicion for gib repeat ct a/p  trend h/h, transfuse as needed  cont ppi  heme/onc recs appreciated, anemia multifactorial  hold ac asa nsaids  dw sister, prefers conservative management, declines egd/colon

## 2018-10-11 NOTE — PROGRESS NOTE ADULT - ASSESSMENT
73 year old male with HTN, and documented MI and congestive heart failure, here with altered mental status.  Unclear etiology of symptoms though Likely all metabolic in origin. He has been starving himself for the last 2 weeks and has had significant diarrhea  Labs notable for elevated BNP, with some mild overload on exam    #1 AMS   - No sign of acute ischemia. EKG is sinus tachycardia with APCs.   - tachy is likely reactive  - s/p RRT today for AMS, however, awakened and became agitated.  Now back to baseline    #2 DVT/PE  - Coumadin on hold  - H/H trending down.  Transfuse to keep Hgb >7.  Will need Lasix post transfusion    #3 HFpEF  - mild Volume Overload with no evidence of ADHF now  - TTE showed preserved LVF with evidence of diastolic dysfunction but no significant valvular diseases.  - Hold off on diuresis at this time   - Watch creatinine and electrolytes. Keep K>4, Mg>2    #4 DIOR  - Creatinine is stable  - Avoid nephrotoxics     - Further cardiac workup pending clinical course  - Will follow     Salvatore MENDEZ  Cardiology

## 2018-10-11 NOTE — PROGRESS NOTE ADULT - ASSESSMENT
74 y/o man w morbid obesity, bipolar disorder, decubitus ulcers, resident of [facility], bedbound for 2 years, being w/u for diarrhea last few weeks, stopped eating/drinking/taking meds for 6weeks, brought in to ER after being seen by his daughter with symptoms of increased lethargy/abdomen pain/diarrhea. Was seen in [facility] 9/30 for similar findings, CT c/a/p only sig for enlarged prostate.  CT head no acute findings, CT a/p w bibasilar pneumonia.  Seen by Neuro/Cardiology/ID/Pulm/Psych, started on antibiotics    noted to have precipitous drop in H/H since 9/30(from 11.3 to 9.3 on  10/5 and 7.5 on 10/6) with peripheral blood smear morphology no sig pathology  C diff negative but stool is guaiac positive  CT a/p no hematoma  Post 1 unit PRBC on admission w appropriate incr of H/H,  iron studies c/w acute illness/inflammation  w/u severe B12 and folate deficiency  off Coumadin since admission    hgb 6.9 this morning     -continue B12 and folate repletion, follow serial CBC  -mild thrombocytopenia likely due to B12/folate deficiency, adequate at present level, continue serial monitoring  -GI is considering workup if HCP agrees and when medically optimized     bleeding is from wounds and does not appear to be GI in etiology  to transfuse 1 unit PRBC today

## 2018-10-11 NOTE — PROGRESS NOTE ADULT - SUBJECTIVE AND OBJECTIVE BOX
Patient is a 73y old  Male who presents with a chief complaint of not feeling well (08 Oct 2018 10:23)      Patient seen in follow up for CKD 3. Pt with poor PO intake. Family opting for comfort care.     PAST MEDICAL HISTORY:  Myocardial infarct, old  Gastroesophageal reflux disease, esophagitis presence not specified  Other pulmonary embolism with acute cor pulmonale, unspecified chronicity  Acute congestive heart failure, unspecified heart failure type  Cellulitis, unspecified cellulitis site  Candida infection  Acute congestive heart failure, unspecified heart failure type  Rheumatoid arthritis with positive rheumatoid factor, involving unspecified site  Primary osteoarthritis, unspecified site  Chronic atrial fibrillation  Bipolar 1 disorder  Angina at rest  COPD mixed type  Essential hypertension  Morbid obesity      MEDICATIONS  (STANDING):  acetaminophen   Tablet .. 650 milliGRAM(s) Oral once  ALBUTerol    0.083% 2.5 milliGRAM(s) Nebulizer every 6 hours  diphenhydrAMINE 25 milliGRAM(s) Oral once  morphine  Infusion 0.5 mG/Hr (0.5 mL/Hr) IV Continuous <Continuous>  nystatin Powder 1 Application(s) Topical two times a day  QUEtiapine 100 milliGRAM(s) Oral daily  QUEtiapine 100 milliGRAM(s) Oral at bedtime  venlafaxine XR. 150 milliGRAM(s) Oral daily    MEDICATIONS  (PRN):  aluminum hydroxide/magnesium hydroxide/simethicone Suspension 30 milliLiter(s) Oral every 6 hours PRN Dyspepsia  artificial  tears Solution 1 Drop(s) Both EYES four times a day PRN Dry Eyes  atropine 1% Ophthalmic Solution for SubLingual Use 2 Drop(s) SubLingual four times a day PRN oral secretions  bisacodyl 5 milliGRAM(s) Oral every 12 hours PRN Constipation  bisacodyl Suppository 10 milliGRAM(s) Rectal daily PRN Constipation  HYDROmorphone  Injectable 1 milliGRAM(s) IV Push every 90 minutes PRN distress, pain, dyspnea,  LORazepam   Injectable 1 milliGRAM(s) IV Push every 2 hours PRN dyspnea, distress, anxiety  ondansetron Injectable 4 milliGRAM(s) IV Push every 6 hours PRN Nausea  polyethylene glycol 3350 17 Gram(s) Oral daily PRN Constipation  promethazine Suppository 12.5 milliGRAM(s) Rectal four times a day PRN breakthrough nausea / vomiting  sodium chloride 0.65% Nasal 1 Spray(s) Both Nostrils four times a day PRN Nasal Congestion    T(C): 37.1 (10-11-18 @ 08:00), Max: 37.2 (10-09-18 @ 23:32)  HR: 78 (10-11-18 @ 08:00) (59 - 96)  BP: 112/67 (10-11-18 @ 08:00) (98/61 - 118/74)  RR: 20 (10-11-18 @ 08:00)  SpO2: 97% (10-11-18 @ 08:00)  Wt(kg): --  I&O's Detail    10 Oct 2018 07:01  -  11 Oct 2018 07:00  --------------------------------------------------------  IN:  Total IN: 0 mL    OUT:    Indwelling Catheter - Urethral: 800 mL  Total OUT: 800 mL    Total NET: -800 mL      11 Oct 2018 07:01  -  11 Oct 2018 13:49  --------------------------------------------------------  IN:  Total IN: 0 mL    OUT:    Indwelling Catheter - Urethral: 400 mL  Total OUT: 400 mL    Total NET: -400 mL                PHYSICAL EXAM:  General: NAD  Respiratory: b/l air entry  Cardiovascular: S1 S2  Gastrointestinal: soft  Extremities:  + edema                 LABORATORY:                        6.9    x     )-----------( x        ( 11 Oct 2018 10:12 )             21.8     10-10    149<H>  |  119<H>  |  26<H>  ----------------------------<  135<H>  4.4   |  23  |  1.40<H>    Ca    7.6<L>      10 Oct 2018 11:59  Phos  1.9     10-10  Mg     2.3     10-10    TPro  6.2  /  Alb  2.3<L>  /  TBili  0.6  /  DBili  x   /  AST  18  /  ALT  17  /  AlkPhos  81  10-10    Sodium, Serum: 149 mmol/L (10-10 @ 11:59)  Sodium, Serum: 149 mmol/L (10-10 @ 07:36)    Potassium, Serum: 4.4 mmol/L (10-10 @ 11:59)  Potassium, Serum: 3.9 mmol/L (10-10 @ 07:36)    Hemoglobin: 6.9 g/dL (10-11 @ 10:12)  Hemoglobin: 7.7 g/dL (10-10 @ 11:59)  Hemoglobin: 6.9 g/dL (10-10 @ 07:36)  Hemoglobin: 7.1 g/dL (10-09 @ 17:05)    Creatinine, Serum 1.40 (10-10 @ 11:59)  Creatinine, Serum 1.10 (10-10 @ 07:36)    CARDIAC MARKERS ( 10 Oct 2018 19:18 )  <.015 ng/mL / x     / 167 U/L / x     / 4.4 ng/mL  CARDIAC MARKERS ( 10 Oct 2018 11:59 )  <.015 ng/mL / x     / 130 U/L / x     / 4.2 ng/mL      LIVER FUNCTIONS - ( 10 Oct 2018 11:59 )  Alb: 2.3 g/dL / Pro: 6.2 g/dL / ALK PHOS: 81 U/L / ALT: 17 U/L / AST: 18 U/L / GGT: x             ABG - ( 10 Oct 2018 11:36 )  pH, Arterial: 7.34  pH, Blood: x     /  pCO2: 37    /  pO2: 89    / HCO3: 20    / Base Excess: -5.5  /  SaO2: 97

## 2018-10-11 NOTE — PROGRESS NOTE ADULT - SUBJECTIVE AND OBJECTIVE BOX
[facility] -- Madai Farah, Kalyan Gonzalez, Paul Wiley Savella  Office # [phone]      Follow Up:  htn, cad, hf, altered ms    Subjective/Observations: No events overnight resting comfortably in bed without complaints       REVIEW OF SYSTEMS: All other review of systems is negative unless indicated above    PAST MEDICAL & SURGICAL HISTORY:  Myocardial infarct, old  Gastroesophageal reflux disease, esophagitis presence not specified  Other pulmonary embolism with acute cor pulmonale, unspecified chronicity  Acute congestive heart failure, unspecified heart failure type  Cellulitis, unspecified cellulitis site: abdomen buttocks  Candida infection  Acute congestive heart failure, unspecified heart failure type  Rheumatoid arthritis with positive rheumatoid factor, involving unspecified site  Primary osteoarthritis, unspecified site  Chronic atrial fibrillation  Bipolar 1 disorder  Angina at rest  COPD mixed type  Essential hypertension  Morbid obesity      MEDICATIONS  (STANDING):  ALBUTerol    0.083% 2.5 milliGRAM(s) Nebulizer every 6 hours  buDESOnide   0.5 milliGRAM(s) Respule 0.5 milliGRAM(s) Inhalation every 12 hours  cyanocobalamin Injectable 1000 MICROGram(s) IntraMuscular daily  folic acid Injectable 1 milliGRAM(s) IV Push daily  multivitamin 1 Tablet(s) Oral daily  nystatin Powder 1 Application(s) Topical two times a day  pantoprazole  Injectable 40 milliGRAM(s) IV Push daily  QUEtiapine 100 milliGRAM(s) Oral daily  QUEtiapine 100 milliGRAM(s) Oral at bedtime  sodium bicarbonate 650 milliGRAM(s) Oral three times a day  sodium chloride 0.9% with potassium chloride 20 mEq/L 1000 milliLiter(s) (50 mL/Hr) IV Continuous <Continuous>  tiotropium 18 MICROgram(s) Capsule 1 Capsule(s) Inhalation daily  venlafaxine XR. 150 milliGRAM(s) Oral daily    MEDICATIONS  (PRN):  morphine  - Injectable 2 milliGRAM(s) IV Push every 4 hours PRN Mild Pain (1 - 3)  OLANZapine Injectable 10 milliGRAM(s) IntraMuscular every 6 hours PRN Acute agitation  ondansetron Injectable 4 milliGRAM(s) IV Push every 6 hours PRN Nausea  promethazine Suppository 12.5 milliGRAM(s) Rectal four times a day PRN breakthrough nausea / vomiting  sodium chloride 0.65% Nasal 1 Spray(s) Both Nostrils four times a day PRN Nasal Congestion      Allergies    penicillin (Unknown)  strawberry (Unknown)    Intolerances  Vital Signs Last 24 Hrs  T(C): 37.1 (11 Oct 2018 08:00), Max: 37.1 (11 Oct 2018 08:00)  T(F): 98.7 (11 Oct 2018 08:00), Max: 98.7 (11 Oct 2018 08:00)  HR: 78 (11 Oct 2018 08:00) (59 - 96)  BP: 112/67 (11 Oct 2018 08:00) (98/61 - 112/67)  BP(mean): --  RR: 20 (11 Oct 2018 08:00) (19 - 22)  SpO2: 97% (11 Oct 2018 08:00) (95% - 100%)    I&O's Summary    10 Oct 2018 07:01  -  11 Oct 2018 07:00  --------------------------------------------------------  IN: 0 mL / OUT: 800 mL / NET: -800 mL          PHYSICAL EXAM:  TELE: SR @96 no events    Constitutional: NAD, awake and alert, well-developed  HEENT: Moist Mucous Membranes, Anicteric  Pulmonary: Non-labored, breath sounds are clear bilaterally, No wheezing, rales or rhonchi  Cardiovascular: Regular, S1 and S2, No murmurs, rubs, gallops or clicks  Gastrointestinal: Bowel Sounds present, soft, nontender.   Lymph: No peripheral edema. No lymphadenopathy.  Skin: No visible rashes or ulcers.  Psych:  Mood & affect appropriate    LABS: All Labs Reviewed:                        6.9    x     )-----------( x        ( 11 Oct 2018 10:12 )             21.8                         7.7    4.47  )-----------( 106      ( 10 Oct 2018 11:59 )             24.5                         6.9    3.47  )-----------( 84       ( 10 Oct 2018 07:36 )             21.8     10 Oct 2018 11:59    149    |  119    |  26     ----------------------------<  135    4.4     |  23     |  1.40   10 Oct 2018 07:36    149    |  119    |  26     ----------------------------<  123    3.9     |  22     |  1.10     Ca    7.6        10 Oct 2018 11:59  Ca    7.3        10 Oct 2018 07:36  Phos  1.9       10 Oct 2018 11:59  Mg     2.3       10 Oct 2018 11:59    TPro  6.2    /  Alb  2.3    /  TBili  0.6    /  DBili  x      /  AST  18     /  ALT  17     /  AlkPhos  81     10 Oct 2018 11:59    PT/INR - ( 11 Oct 2018 07:08 )   PT: 28.7 sec;   INR: 2.58 ratio           CARDIAC MARKERS ( 10 Oct 2018 19:18 )  <.015 ng/mL / x     / 167 U/L / x     / 4.4 ng/mL  CARDIAC MARKERS ( 10 Oct 2018 11:59 )  <.015 ng/mL / x     / 130 U/L / x     / 4.2 ng/mL           ECG:  < from: 12 Lead ECG (10.10.18 @ 11:33) >  Ventricular Rate 110 BPM    Atrial Rate 110 BPM    P-R Interval 170 ms    QRS Duration 82 ms    Q-T Interval 402 ms    QTC Calculation(Bezet) 544 ms    P Axis 42 degrees    R Axis 6 degrees    T Axis -53 degrees    Diagnosis Line Sinus tachycardia  APCs  ST & T wave abnormality, consider anterior ischemia  Prolonged QT  Abnormal ECG  Confirmed by Alberto Gonzalez MD ([id number]) on 10/10/2018 12:38:17 PM    < end of copied text >    Echo:  < from: TTE Echo Doppler w/o Cont (10.09.18 @ 15:16) >  EXAM:  ECHO TTE W/O CON COMP W/DOPPLR         PROCEDURE DATE:  10/09/2018        INTERPRETATION:  INDICATION: Abnormal EKG  Referring M.D.:Alamuri  Blood Pressure 110/60        Weight (kg) :181     Height (cm):172       BSA (sq m): 2.74  Technician: KATERINE    Dimensions:    LA 4.2       Normal Values: 2.0 - 4.0 cm    Ao 3.7        Normal Values: 2.0 - 3.8 cm  SEPTUM 1.3       Normal Values: 0.6 - 1.2 cm  PWT 1.3       Normal Values: 0.6 - 1.1 cm  LVIDd 4.0         Normal Values: 3.0 - 5.6 cm  LVIDs 3.2         Normal Values: 1.8 - 4.0 cm      OBSERVATIONS:    Mitral Valve: Mitral annular calcification with mild mitral regurgitation  Aortic Valve/Aorta: Mild aortic stenosis with a peak gradient of 23 mm in   mean gradient of 12 mm  Tricuspid Valve: Normal tricuspid valve. Trace     tricuspid   regurgitation.  Pulmonic Valve: The pulmonic valve is not well visualized. Probably   normal.  Left Atrium: Mild dilatation  Right Atrium: Normal  Left Ventricle: The endocardium is not well-visualized but there appears   to be mild left ventricular hypertrophy with normal global left   ventricular systolic function. The EF is approximately 50%.  Right Ventricle: Normal right ventricular size and function.  Pericardium/Pleura: No pericardial effusion noted.  Pulmonary/RV Pressure: The right ventricular systolic pressure is   estimated to be normal  LV Diastolic Function: Doppler evidence suggestive of diastolic   dysfunction    Conclusion: Technically limited study secondary to the patient's body   habitus and clinical condition   1 the endocardium is not well-visualized but there appears to be normal   global left ventricular systolic function with mild left ventricular   hypertrophy  2. Mitral annular calcification with mild mitral regurgitation  3. Mild aortic stenosis with a peak gradient of 23 mm in mean gradient of   12 mm  4. Doppler evidence suggestive of diastolic dysfunction                  ALBERTO GONZALEZ M.D., ATTENDING CARDIOLOGIST  This document has been electronically signed. Oct  9 2018  5:10PM                < end of copied text >    Radiology:    < from: Xray Chest 1 View- PORTABLE-Routine (10.08.18 @ 08:54) >  EXAM:  XR CHEST PORTABLE ROUTINE 1V                            PROCEDURE DATE:  10/08/2018          INTERPRETATION:      INDICATION: Assess for pneumonia    Single frontal view of the chest compared to prior study of 10/6/2018    FINDINGS/  IMPRESSION:       There appears to be multifocal airspace opacities without focal   consolidation, probable mild congestive changes. There is no pleural   effusion or pneumothorax.  The cardiac silhouette is within normal   limits.  There is  degenerative changes. If symptoms persist, and   additional imaging evaluation is needed, follow-up CT is suggested.                  KEVIN CEBALLOS M.D., ATTENDING RADIOLOGIST  This document has been electronically signed. Oct  8 2018  9:54AM    < end of copied text >         Alberto MENDEZ

## 2018-10-11 NOTE — CHART NOTE - NSCHARTNOTEFT_GEN_A_CORE
RN called because the patient wanted to speak with a physician about being unable to eat. The patient stated that food was getting stuck in his throat and burning his tongue whenever he tried eating. Speech and swallow evaluation was ordered. Will follow up as needed.

## 2018-10-11 NOTE — PROGRESS NOTE ADULT - SUBJECTIVE AND OBJECTIVE BOX
Neurology Follow up note    TIMOTEO PEDERSEN73yMale    HPI:  presented to er after patient was having many non specific complaints.    patient is poor historian.  per PMD, patient was being worked up diarhea for past few weeks.  In ER patient was found to have possible PNA on CT.  patient also has lactemia on labs.  Patient is being admitted for further work up and treatment. (05 Oct 2018 18:07)      Interval History - no new events     Patient is seen, chart was reviewed and case was discussed with the treatment team.  Pt is not in any distress.   Lying on bed comfortably.   No clinical seizure was reported.    Vital Signs Last 24 Hrs  T(C): 36.9 (11 Oct 2018 15:58), Max: 37.1 (11 Oct 2018 08:00)  T(F): 98.4 (11 Oct 2018 15:58), Max: 98.7 (11 Oct 2018 08:00)  HR: 90 (11 Oct 2018 15:58) (78 - 96)  BP: 129/81 (11 Oct 2018 15:58) (98/61 - 129/81)  BP(mean): --  RR: 20 (11 Oct 2018 15:58) (19 - 20)  SpO2: 93% (11 Oct 2018 15:58) (93% - 99%)              On Neurological Examination:    Mental Status - Pt is  awake ,refusing dinner,  I am not hungry.    Speech -  dysarthric.    Cranial Nerves - Pupils 3 mm equal and reactive to light,  Pt has no  facial asymmetry. Facial sensation is intact  .Tongue - is in midline.    Muscle tone - nl.    Motor Exam - UE 3-4/5.  LE 1-2/5.    Sensory Exam  Pt withdraws all extremities equally on stimulation. No asymmetry seen.      Deep tendon Reflexes - 2 plus all over.       Neck Supple -  Yes.     MEDICATIONS    ALBUTerol    0.083% 2.5 milliGRAM(s) Nebulizer every 6 hours  buDESOnide   0.5 milliGRAM(s) Respule 0.5 milliGRAM(s) Inhalation every 12 hours  cyanocobalamin Injectable 1000 MICROGram(s) IntraMuscular daily  famotidine    Tablet 20 milliGRAM(s) Oral two times a day  folic acid 1 milliGRAM(s) Oral daily  lactobacillus acidophilus 1 Tablet(s) Oral three times a day with meals  morphine  - Injectable 2 milliGRAM(s) IV Push every 4 hours PRN  multivitamin 1 Tablet(s) Oral daily  nystatin Powder 1 Application(s) Topical two times a day  OLANZapine Injectable 10 milliGRAM(s) IntraMuscular every 6 hours PRN  ondansetron Injectable 4 milliGRAM(s) IV Push every 6 hours PRN  promethazine Suppository 12.5 milliGRAM(s) Rectal four times a day PRN  QUEtiapine 100 milliGRAM(s) Oral daily  QUEtiapine 100 milliGRAM(s) Oral at bedtime  sodium bicarbonate 650 milliGRAM(s) Oral three times a day  sodium chloride 0.65% Nasal 1 Spray(s) Both Nostrils four times a day PRN  tiotropium 18 MICROgram(s) Capsule 1 Capsule(s) Inhalation daily  venlafaxine XR. 150 milliGRAM(s) Oral daily      Allergies    penicillin (Unknown)  strawberry (Unknown)    Intolerances        LABS:  CBC Full  -  ( 10 Oct 2018 11:59 )  WBC Count : 4.47 K/uL  Hemoglobin : 7.7 g/dL  Hematocrit : 24.5 %  Platelet Count - Automated : 106 K/uL  Mean Cell Volume : 93.2 fl  Mean Cell Hemoglobin : 29.3 pg  Mean Cell Hemoglobin Concentration : 31.4 gm/dL  Auto Neutrophil # : x  Auto Lymphocyte # : x  Auto Monocyte # : x  Auto Eosinophil # : x  Auto Basophil # : x  Auto Neutrophil % : x  Auto Lymphocyte % : x  Auto Monocyte % : x  Auto Eosinophil % : x  Auto Basophil % : x    Urinalysis Basic - ( 09 Oct 2018 08:24 )    Color: Yellow / Appearance: Slightly Turbid / S.010 / pH: x  Gluc: x / Ketone: Trace  / Bili: Small / Urobili: Negative   Blood: x / Protein: 75 mg/dL / Nitrite: Negative   Leuk Esterase: Small / RBC: 25-50 /HPF / WBC 3-5   Sq Epi: x / Non Sq Epi: Occasional / Bacteria: Few      10-10    149<H>  |  119<H>  |  26<H>  ----------------------------<  123<H>  3.9   |  22  |  1.10    Ca    7.3<L>      10 Oct 2018 07:36      Hemoglobin A1C:     Vitamin B12     RADIOLOGY  < from: CT Head No Cont (10.06.18 @ 04:40) >    EXAM:  CT BRAIN                            PROCEDURE DATE:  10/06/2018          INTERPRETATION:  10/6/2018 5:10 AM    CLINICAL HISTORY:  Altered mental status.    TECHNIQUE: Axial CT images are obtained from the cranial vertex to the   skullbase without the administration of IV contrast.    COMPARISON: None    FINDINGS:    There is volume loss accounting for prominent ventricles. There is   atherosclerosis. White matter hypodensities noted compatible with   moderate chronic microvascular ischemic change in this age group. There   is no midline shift, mass effect, extra axial collection, or intracranial   hemorrhage.    The calvarium is intact. The visualized paranasal sinuses are aerated.   The mastoid air cells are clear.    IMPRESSION:    No acute hemorrhage or mass effect. Chronic changes noted                OVI FRANCO M.D., ATTENDING RADIOLOGIST  This document has been electronically signed. Oct  6 2018  5:14AM      < from: CT Head No Cont (10.10.18 @ 14:00) >    EXAM:  CT BRAIN                            PROCEDURE DATE:  10/10/2018          INTERPRETATION:  CLINICAL INDICATION: Altered mental status.  Bipolar   disorder.    TECHNIQUE : Axial CT scanning of the brain was obtained from the skull   base to the vertex without the administration of intravenous contrast.   Coronal and sagittal reformatted images were subsequently obtained.    COMPARISON: 10/6/2018    FINDINGS:    There is no CT evidence of acute transcortical infarct.    Age-related involutional changes and microvascular ischemic changes are   seen.    There is no hydrocephalus, mass effect, midline shift, or acute   intracranial hemorrhage. No extra-axial collection.       The visualized paranasal sinuses and mastoid air cells are without   significant disease.    The calvarium is intact.    Right cataract surgery.    IMPRESSION:    No evidence of acute intracranial pathology.              DEENA LAWSON M.D., ATTENDING RADIOLOGIST  This document has been electronically signed. Oct 10 2018  2:13PM                        ASSESSMENT AND PLAN:      AMS multifactorial; no cva as  ct head x2 negative  SP GI BLEED.  MULTIPLE DECUBITUS   RESPIRATORY INSUFFICIENCY,  BIPOLAR DISORDER.    FAMILY DECIDED TO GO FOR COMFORT CARE.  WOULD SIGN OFF.  Pain is accessed and addressed.

## 2018-10-11 NOTE — PROGRESS NOTE ADULT - SUBJECTIVE AND OBJECTIVE BOX
INTERVAL HPI/OVERNIGHT EVENTS:  pt seen and examined, yesterdays event noted  more awake alert mild confusion  states he was up all night and didnt sleep  denies abd pain  per overnight rn no s/s overt gib overnight, pt with noted bleeding from wounds when turned, passing soft brown stools, no vomiting  afebrile overnight no new labs to assess  did not receive transfusion yesterday    MEDICATIONS  (STANDING):  ALBUTerol    0.083% 2.5 milliGRAM(s) Nebulizer every 6 hours  buDESOnide   0.5 milliGRAM(s) Respule 0.5 milliGRAM(s) Inhalation every 12 hours  cyanocobalamin Injectable 1000 MICROGram(s) IntraMuscular daily  folic acid Injectable 1 milliGRAM(s) IV Push daily  multivitamin 1 Tablet(s) Oral daily  nystatin Powder 1 Application(s) Topical two times a day  pantoprazole  Injectable 40 milliGRAM(s) IV Push daily  QUEtiapine 100 milliGRAM(s) Oral daily  QUEtiapine 100 milliGRAM(s) Oral at bedtime  sodium bicarbonate 650 milliGRAM(s) Oral three times a day  sodium chloride 0.9% with potassium chloride 20 mEq/L 1000 milliLiter(s) (50 mL/Hr) IV Continuous <Continuous>  tiotropium 18 MICROgram(s) Capsule 1 Capsule(s) Inhalation daily  venlafaxine XR. 150 milliGRAM(s) Oral daily    MEDICATIONS  (PRN):  morphine  - Injectable 2 milliGRAM(s) IV Push every 4 hours PRN Mild Pain (1 - 3)  OLANZapine Injectable 10 milliGRAM(s) IntraMuscular every 6 hours PRN Acute agitation  ondansetron Injectable 4 milliGRAM(s) IV Push every 6 hours PRN Nausea  promethazine Suppository 12.5 milliGRAM(s) Rectal four times a day PRN breakthrough nausea / vomiting  sodium chloride 0.65% Nasal 1 Spray(s) Both Nostrils four times a day PRN Nasal Congestion      Allergies    penicillin (Unknown)  strawberry (Unknown)    Intolerances        Review of Systems:    unable to obtain in entirety      Vital Signs Last 24 Hrs  T(C): 37 (11 Oct 2018 04:45), Max: 37 (10 Oct 2018 15:46)  T(F): 98.6 (11 Oct 2018 04:45), Max: 98.6 (10 Oct 2018 15:46)  HR: 92 (11 Oct 2018 08:00) (59 - 96)  BP: 98/61 (11 Oct 2018 04:45) (98/61 - 110/69)  BP(mean): --  RR: 19 (11 Oct 2018 04:45) (19 - 22)  SpO2: 97% (11 Oct 2018 08:00) (95% - 100%)    PHYSICAL EXAM:    Constitutional: obese, in nad, lying in bed  HEENT: ncat  Neck: No LAD  Respiratory: dec bs  Cardiovascular: S1 and S2, RRR  Gastrointestinal: obese abd appears nt mild dt  Extremities: No peripheral edema  Vascular: 2+ peripheral pulses  Neurological: lethargic but arousable  Skin: No rashes    LABS:                        7.7    4.47  )-----------( 106      ( 10 Oct 2018 11:59 )             24.5     10-10    149<H>  |  119<H>  |  26<H>  ----------------------------<  135<H>  4.4   |  23  |  1.40<H>    Ca    7.6<L>      10 Oct 2018 11:59  Phos  1.9     10-10  Mg     2.3     10-10    TPro  6.2  /  Alb  2.3<L>  /  TBili  0.6  /  DBili  x   /  AST  18  /  ALT  17  /  AlkPhos  81  10-10    PT/INR - ( 11 Oct 2018 07:08 )   PT: 28.7 sec;   INR: 2.58 ratio               RADIOLOGY & ADDITIONAL TESTS: INTERVAL HPI/OVERNIGHT EVENTS:  pt seen and examined, yesterdays events noted  more awake alert mild confusion  states he was up all night and didnt sleep  denies abd pain  per overnight rn no s/s overt gib overnight, pt with noted bleeding from wounds when turned, passing soft brown stools, no vomiting  afebrile overnight no new labs to assess  did not receive transfusion yesterday    MEDICATIONS  (STANDING):  ALBUTerol    0.083% 2.5 milliGRAM(s) Nebulizer every 6 hours  buDESOnide   0.5 milliGRAM(s) Respule 0.5 milliGRAM(s) Inhalation every 12 hours  cyanocobalamin Injectable 1000 MICROGram(s) IntraMuscular daily  folic acid Injectable 1 milliGRAM(s) IV Push daily  multivitamin 1 Tablet(s) Oral daily  nystatin Powder 1 Application(s) Topical two times a day  pantoprazole  Injectable 40 milliGRAM(s) IV Push daily  QUEtiapine 100 milliGRAM(s) Oral daily  QUEtiapine 100 milliGRAM(s) Oral at bedtime  sodium bicarbonate 650 milliGRAM(s) Oral three times a day  sodium chloride 0.9% with potassium chloride 20 mEq/L 1000 milliLiter(s) (50 mL/Hr) IV Continuous <Continuous>  tiotropium 18 MICROgram(s) Capsule 1 Capsule(s) Inhalation daily  venlafaxine XR. 150 milliGRAM(s) Oral daily    MEDICATIONS  (PRN):  morphine  - Injectable 2 milliGRAM(s) IV Push every 4 hours PRN Mild Pain (1 - 3)  OLANZapine Injectable 10 milliGRAM(s) IntraMuscular every 6 hours PRN Acute agitation  ondansetron Injectable 4 milliGRAM(s) IV Push every 6 hours PRN Nausea  promethazine Suppository 12.5 milliGRAM(s) Rectal four times a day PRN breakthrough nausea / vomiting  sodium chloride 0.65% Nasal 1 Spray(s) Both Nostrils four times a day PRN Nasal Congestion      Allergies    penicillin (Unknown)  strawberry (Unknown)    Intolerances        Review of Systems:    unable to obtain in entirety      Vital Signs Last 24 Hrs  T(C): 37 (11 Oct 2018 04:45), Max: 37 (10 Oct 2018 15:46)  T(F): 98.6 (11 Oct 2018 04:45), Max: 98.6 (10 Oct 2018 15:46)  HR: 92 (11 Oct 2018 08:00) (59 - 96)  BP: 98/61 (11 Oct 2018 04:45) (98/61 - 110/69)  BP(mean): --  RR: 19 (11 Oct 2018 04:45) (19 - 22)  SpO2: 97% (11 Oct 2018 08:00) (95% - 100%)    PHYSICAL EXAM:    Constitutional: obese, in nad, lying in bed  HEENT: ncat  Neck: No LAD  Respiratory: dec bs  Cardiovascular: S1 and S2, RRR  Gastrointestinal: obese abd soft generalized ttp mild dt  Extremities: No peripheral edema  Vascular: 2+ peripheral pulses  Neurological: awake alert periods of confusion  Skin: No rashes    LABS:                        7.7    4.47  )-----------( 106      ( 10 Oct 2018 11:59 )             24.5     10-10    149<H>  |  119<H>  |  26<H>  ----------------------------<  135<H>  4.4   |  23  |  1.40<H>    Ca    7.6<L>      10 Oct 2018 11:59  Phos  1.9     10-10  Mg     2.3     10-10    TPro  6.2  /  Alb  2.3<L>  /  TBili  0.6  /  DBili  x   /  AST  18  /  ALT  17  /  AlkPhos  81  10-10    PT/INR - ( 11 Oct 2018 07:08 )   PT: 28.7 sec;   INR: 2.58 ratio               RADIOLOGY & ADDITIONAL TESTS:

## 2018-10-11 NOTE — PROGRESS NOTE ADULT - PROBLEM SELECTOR PLAN 1
resolved per nursing  CT negative for acute abdominal pathology   prior c diff negative   cont bacid  diet as tolerated  monitor stool output/abd exam  if w recurring loose stools, check gi pcr

## 2018-10-11 NOTE — PROGRESS NOTE ADULT - SUBJECTIVE AND OBJECTIVE BOX
ID Progress note     Name: TIMOTEO PEDERSEN  Age: 73y  Gender: Male  MRN: [id number]    Interval History-- Events noted, remains confused . GI and hematology follow up noted . Was to get blood transfusion today but both patient and family refused as per RN  . Noted to have bleeding from the wounds   Notes reviewed    Past Medical History--  Myocardial infarct, old  Gastroesophageal reflux disease, esophagitis presence not specified  Other pulmonary embolism with acute cor pulmonale, unspecified chronicity  Acute congestive heart failure, unspecified heart failure type  Cellulitis, unspecified cellulitis site  Candida infection  Acute congestive heart failure, unspecified heart failure type  Rheumatoid arthritis with positive rheumatoid factor, involving unspecified site  Primary osteoarthritis, unspecified site  Chronic atrial fibrillation  Bipolar 1 disorder  Angina at rest  COPD mixed type  Essential hypertension  Morbid obesity      For details regarding the patient's social history, family history, and other miscellaneous elements, please refer the initial infectious diseases consultation and/or the admitting history and physical examination for this admission.    Allergies--  Allergies    penicillin (Unknown)  strawberry (Unknown)    Intolerances        Medications--  Antibiotics:    Immunologic:    Other:  acetaminophen   Tablet ..  ALBUTerol    0.083%  aluminum hydroxide/magnesium hydroxide/simethicone Suspension PRN  artificial  tears Solution PRN  atropine 1% Ophthalmic Solution for SubLingual Use PRN  bisacodyl PRN  bisacodyl Suppository PRN  diphenhydrAMINE  HYDROmorphone  Injectable PRN  LORazepam   Injectable PRN  morphine  Infusion  nystatin Powder  ondansetron Injectable PRN  polyethylene glycol 3350 PRN  promethazine Suppository PRN  QUEtiapine  QUEtiapine  sodium chloride 0.65% Nasal PRN  venlafaxine XR.      Review of Systems--  Review of systems unable to be obtained secondary to clinical condition.    Physical Examination--    Vital Signs: T(F): 98.4 (10-11-18 @ 15:58), Max: 98.7 (10-11-18 @ 08:00)  HR: 90 (10-11-18 @ 15:58)  BP: 129/81 (10-11-18 @ 15:58)  RR: 20 (10-11-18 @ 15:58)  SpO2: 93% (10-11-18 @ 15:58)  Wt(kg): --    General: Morbidly obese  chronically ill appearing Male in no acute distress.  HEENT: AT/NC. PERRL. EOMI. Anicteric. Conjunctiva pink and moist. Oropharynx clear. Dentition fair.  Neck: Not rigid. No sense of mass.  Nodes: None palpable.  Lungs: Clear bilaterally without rales, wheezing or rhonchi  Heart: Regular rate and rhythm. No Murmur. No rub. No gallop. No palpable thrill.  Abdomen: Bowel sounds present and normoactive. Soft. obese, large pannus . multiple superficial skin  wounds .  Back: No spinal tenderness. No costovertebral angle tenderness.   Extremities: No cyanosis or clubbing. No edema.   Skin: Warm. Dry.  on buttocks DTI 30 cm x 30 cm , has superficial maceration in the skin folds   Psychiatric: Appropriate affect and mood for situation.       Laboratory Studies--  CBC                        6.9    x     )-----------( x        ( 11 Oct 2018 10:12 )             21.8     Complete Blood Count (10.10.18 @ 11:59)    Nucleated RBC: 1 /100 WBCs    WBC Count: 4.47 K/uL    RBC Count: 2.63 M/uL    Hemoglobin: 7.7 g/dL    Hematocrit: 24.5 %    Mean Cell Volume: 93.2 fl    Mean Cell Hemoglobin: 29.3 pg    Mean Cell Hemoglobin Conc: 31.4 gm/dL    Red Cell Distrib Width: 21.8 %    Platelet Count - Automated: 106 K/uL      Chemistries  10-10    149<H>  |  119<H>  |  26<H>  ----------------------------<  135<H>  4.4   |  23  |  1.40<H>    Ca    7.6<L>      10 Oct 2018 11:59  Phos  1.9     10-10  Mg     2.3     10-10    TPro  6.2  /  Alb  2.3<L>  /  TBili  0.6  /  DBili  x   /  AST  18  /  ALT  17  /  AlkPhos  81  10-10      Culture Data    Culture - Urine (collected 09 Oct 2018 10:48)  Source: .Urine Clean Catch (Midstream)  Final Report (10 Oct 2018 13:49):    No growth    Culture - Urine (collected 06 Oct 2018 20:52)  Source: .Urine Catheterized  Final Report (07 Oct 2018 23:12):    No growth    Culture - Blood (collected 05 Oct 2018 21:31)  Source: .Blood Blood  Final Report (10 Oct 2018 22:00):    No growth at 5 days.    Culture - Blood (collected 05 Oct 2018 21:31)  Source: .Blood Blood  Final Report (10 Oct 2018 22:00):    No growth at 5 days.      Radiology:  CT Head No Cont (10.10.18 @ 14:00) >    IMPRESSION:    No evidence of acute intracranial pathology.    US Abdomen Limited (10.10.18 @ 13:03) >  IMPRESSION:     Essentially nondiagnostic exam due to poor ultrasound acoustic   penetration secondary to patient's body habitus.      Assessment--  73 year old male hx of morbid obesity, bipolar disorder a LTC resident of SNF, bed bound for over 2 years sent in to ER with complaints of increasing SOB, abdominal pains and diarrhea and increased lethargy , noted to be obtunded and poorly responsive, ABG does not show hypercapnia and ct head was negative . he is afebrile and has normal WBC so doubt its all from infection. he most likely has metabolic encephalopathy. he could have a CNS event although ct head negative . He has elevated P BNP but no evidence of CHF as per cardiology . He has delirium or metabolic encephalopathy . He could also have AMS secondary to his underlying psychiatric illness    He has multiple open wounds and bilateral unstageable DU on both buttocks , don't appears to be grossly infected      he has persistent drop in hg/ Hct  with guaiac positive stool may need endoscopy . He has severe Vit B12 and Folate  deficiency getting supplements     Doubt he has any active infection     He appears volume depleted     Plan :   - will observe off  antibiotics as no evidence of active infection   - trend labs   - wound care as per wound care RN , need to change position frequently   - psychiatry follow up   - continue with IVF   - may need appetite stimulants     He is placed on comfort care and family wants no aggressive measures , he will be placed on Morphine   Hospice evaluation is called     Continue with present regime .  Appropriate use of antibiotics and adverse effects reviewed.    I have discussed the above plan of care with patient and family in detail. They expressed understanding of the treatment plan . Risks, benefits and alternatives discussed in detail. I have asked if they have any questions or concerns and appropriately addressed them to the best of my ability .      > 15 minutes spent in direct patient care reviewing  the notes, lab data/ imaging , discussion with multidisciplinary team. All questions were addressed and answered to the best of my capacity .    I will respectfully sign off the case as going on comfort care .    Thank you for allowing me to participate in the care of your patient .        Derrek Corbin MD  [phone]

## 2018-10-11 NOTE — CHART NOTE - NSCHARTNOTEFT_GEN_A_CORE
Assessment/Follow up: Pt awake/confused with family at bedside. Persistent lack of appetite with poor po intake reported. Per pts sister pt refusing most entrees however does consume cottage cheese, pudding & applesauce with assistance. Pt unable to tolerate soft diet-pts sister requesting puree diet. Pt refused ensure nutritional supplements however agreeable to healthshakes. No report N/V. Large soft BM 10/10. Swallow re-evaluation canceled 10/11 per MD order. Multipel areas of skin breakdown and suspected DTIs docuemented. Pt now on comfort measure only. Food prefs/meal alternatives obtained through family. Plan for hospice evaluation.     Factors impacting intake: [ ] none [ ] nausea  [ ] vomiting [ ] diarrhea [ ] constipation  [ ]chewing problems [ ] swallowing issues  [x ] other: see above    Diet Presciption: Diet, Soft (10-08-18 @ 12:24)    Intake: poor    Current Weight: Weight (kg): 181 (10-05 @ 12:04)    Pertinent Medications: MEDICATIONS  (STANDING):  acetaminophen   Tablet .. 650 milliGRAM(s) Oral once  ALBUTerol    0.083% 2.5 milliGRAM(s) Nebulizer every 6 hours  diphenhydrAMINE 25 milliGRAM(s) Oral once  morphine  Infusion 0.5 mG/Hr (0.5 mL/Hr) IV Continuous <Continuous>  nystatin Powder 1 Application(s) Topical two times a day  QUEtiapine 100 milliGRAM(s) Oral daily  QUEtiapine 100 milliGRAM(s) Oral at bedtime  venlafaxine XR. 150 milliGRAM(s) Oral daily    MEDICATIONS  (PRN):  aluminum hydroxide/magnesium hydroxide/simethicone Suspension 30 milliLiter(s) Oral every 6 hours PRN Dyspepsia  artificial  tears Solution 1 Drop(s) Both EYES four times a day PRN Dry Eyes  atropine 1% Ophthalmic Solution for SubLingual Use 2 Drop(s) SubLingual four times a day PRN oral secretions  bisacodyl 5 milliGRAM(s) Oral every 12 hours PRN Constipation  bisacodyl Suppository 10 milliGRAM(s) Rectal daily PRN Constipation  HYDROmorphone  Injectable 1 milliGRAM(s) IV Push every 90 minutes PRN distress, pain, dyspnea,  LORazepam   Injectable 1 milliGRAM(s) IV Push every 2 hours PRN dyspnea, distress, anxiety  ondansetron Injectable 4 milliGRAM(s) IV Push every 6 hours PRN Nausea  polyethylene glycol 3350 17 Gram(s) Oral daily PRN Constipation  promethazine Suppository 12.5 milliGRAM(s) Rectal four times a day PRN breakthrough nausea / vomiting  sodium chloride 0.65% Nasal 1 Spray(s) Both Nostrils four times a day PRN Nasal Congestion    Pertinent Labs: 10-10 Na149 mmol/L<H> Glu 135 mg/dL<H> K+ 4.4 mmol/L Cr  1.40 mg/dL<H> BUN 26 mg/dL<H> 10-10 Phos 1.9 mg/dL<L> 10-10 Alb 2.3 g/dL<L>     CAPILLARY BLOOD GLUCOSE        Skin: jannette abdomen, buttocks, coccyx and sacrum suspected DTIs. Myles 9    Estimated Needs:   [x ] no change since previous assessment  [ ] recalculated:     Previous Nutrition Diagnosis:   [ ] Inadequate Energy Intake [ ]Inadequate Oral Intake [ ] Excessive Energy Intake   [ ] Underweight [ ] Increased Nutrient Needs [x ] Overweight/Obesity   [ ] Altered GI Function [ ] Unintended Weight Loss [ ] Food & Nutrition Related Knowledge Deficit [ ] Malnutrition     Nutrition Diagnosis is [x ] ongoing  [ ] resolved [ ] not applicable     New Nutrition Diagnosis: [x ] not applicable- Comfort measure only; hospice evaluation pending.       Interventions:   Recommend  [x ] Change Diet To: Puree  [x ] Nutrition Supplement: healthshakes TID  [ ] Nutrition Support  [ ] Other:     Monitoring and Evaluation:   [x ] PO intake [ x ] Tolerance to diet prescription [ x ] weights [ x ] labs[ x ] follow up per protocol  [ ] other:

## 2018-10-12 NOTE — DISCHARGE NOTE ADULT - PATIENT PORTAL LINK FT
You can access the [facility] Patient Portal, offered by [facility], by registering with the following website: [email or web address]

## 2018-10-12 NOTE — DISCHARGE NOTE ADULT - HOSPITAL COURSE
admitted for abdominal pain - no acute intraabdominal pathology  initially treated for possible PNA - which was ruled out  encephalopathy with unclear etiology  No CVA  anemia - etiology unclear, possible GIB  PRBC transfusion  family opted for comfort care  transfer to hospice when bed avaialble

## 2018-10-12 NOTE — PROGRESS NOTE ADULT - ASSESSMENT
73 year old male with HTN, and documented MI and congestive heart failure, here with altered mental status.  Unclear etiology of symptoms though Likely all metabolic in origin. He has been starving himself for the last 2 weeks and has had significant diarrhea      #1 AMS   - No sign of acute ischemia. EKG is sinus tachycardia with APCs.   - tachy is likely reactive  - s/p RRT today for AMS, however, awakened and became agitated.  Now back to baseline    #2 DVT/PE  - Coumadin on hold  - H/H trending down.  Transfuse to keep Hgb >7.  Will need Lasix post transfusion    #3 HFpEF  - mild Volume Overload with no evidence of ADHF now  - TTE showed preserved LVF with evidence of diastolic dysfunction but no significant valvular diseases.  - Hold off on diuresis at this time   - Watch creatinine and electrolytes. Keep K>4, Mg>2    #4 DIOR  - Creatinine is stable  - Avoid nephrotoxics     - Further cardiac workup pending clinical course  - Will follow     Salvatore MENDEZ  Cardiology 73 year old male with HTN, and documented MI and congestive heart failure, here with altered mental status.  Unclear etiology of symptoms though Likely all metabolic in origin. He has been starving himself for the last 2 weeks and has had significant diarrhea      #1 AMS   - No sign of acute ischemia. EKG is sinus tachycardia with APCs.   - tachy is likely reactive    #2 DVT/PE  - Coumadin on hold  - H/H trending down.  Transfuse to keep Hgb >7.  Will need Lasix post transfusion    #3 HFpEF  - mild Volume Overload with no evidence of ADHF now  - TTE showed preserved LVF with evidence of diastolic dysfunction but no significant valvular diseases.  - Hold off on diuresis at this time   - Watch creatinine and electrolytes. Keep K>4, Mg>2    #4 DIOR  - Creatinine is stable  - Avoid nephrotoxics     - Further cardiac workup pending clinical course  - Will follow     Salvatore MENDEZ  Cardiology 73 year old male with HTN, and documented MI and congestive heart failure, here with altered mental status.  Unclear etiology of symptoms though Likely all metabolic in origin. He has been starving himself for the last 2 weeks and has had significant diarrhea      #1 AMS   - No sign of acute ischemia. EKG is sinus tachycardia with APCs.   - tachy is likely reactive    #2 DVT/PE  - Coumadin on hold  - H/H trending down.  Transfuse to keep Hgb >7 if patient not going to hospice care.  Will need Lasix post transfusion    #3 HFpEF  - TTE showed preserved LVF with evidence of diastolic dysfunction but no significant valvular diseases.  - Hold off on diuresis at this time   - Watch creatinine and electrolytes. Keep K>4, Mg>2    #4 DIOR  - Creatinine is stable  - Avoid nephrotoxics     - Further cardiac workup pending clinical course  - Will follow     Salvatore MENDEZ  Cardiology

## 2018-10-12 NOTE — PROGRESS NOTE ADULT - SUBJECTIVE AND OBJECTIVE BOX
INTERVAL HPI/OVERNIGHT EVENTS:  pt seen and examined  resting in  bed  on comfort measure    MEDICATIONS  (STANDING):  ALBUTerol    0.083% 2.5 milliGRAM(s) Nebulizer every 6 hours  morphine  Infusion 1 mG/Hr (1 mL/Hr) IV Continuous <Continuous>  nystatin Powder 1 Application(s) Topical two times a day  QUEtiapine 100 milliGRAM(s) Oral daily  QUEtiapine 100 milliGRAM(s) Oral at bedtime  venlafaxine XR. 150 milliGRAM(s) Oral daily    MEDICATIONS  (PRN):  aluminum hydroxide/magnesium hydroxide/simethicone Suspension 30 milliLiter(s) Oral every 6 hours PRN Dyspepsia  artificial  tears Solution 1 Drop(s) Both EYES four times a day PRN Dry Eyes  atropine 1% Ophthalmic Solution for SubLingual Use 2 Drop(s) SubLingual four times a day PRN oral secretions  bisacodyl 5 milliGRAM(s) Oral every 12 hours PRN Constipation  bisacodyl Suppository 10 milliGRAM(s) Rectal daily PRN Constipation  HYDROmorphone  Injectable 1 milliGRAM(s) IV Push every 90 minutes PRN distress, pain, dyspnea,  LORazepam   Injectable 1 milliGRAM(s) IV Push every 2 hours PRN dyspnea, distress, anxiety  ondansetron Injectable 4 milliGRAM(s) IV Push every 6 hours PRN Nausea  polyethylene glycol 3350 17 Gram(s) Oral daily PRN Constipation  promethazine Suppository 12.5 milliGRAM(s) Rectal four times a day PRN breakthrough nausea / vomiting  sodium chloride 0.65% Nasal 1 Spray(s) Both Nostrils four times a day PRN Nasal Congestion      Allergies    penicillin (Unknown)  strawberry (Unknown)    Intolerances        Review of Systems:    unable to obtain      Vital Signs Last 24 Hrs  T(C): 36.9 (11 Oct 2018 15:58), Max: 36.9 (11 Oct 2018 15:58)  T(F): 98.4 (11 Oct 2018 15:58), Max: 98.4 (11 Oct 2018 15:58)  HR: 94 (12 Oct 2018 07:50) (89 - 96)  BP: 129/81 (11 Oct 2018 15:58) (129/81 - 129/81)  BP(mean): --  RR: 20 (11 Oct 2018 15:58) (20 - 20)  SpO2: 96% (12 Oct 2018 07:50) (93% - 97%)    PHYSICAL EXAM:  Constitutional: obese, in nad, lying in bed  HEENT: ncat  Neck: No LAD  Respiratory: dec bs  Cardiovascular: S1 and S2, RRR  Gastrointestinal: obese abd soft   Extremities: No peripheral edema  Vascular: 2+ peripheral pulses  Neurological: lethargic but arousable  Skin: No rashes        LABS:                        6.9    x     )-----------( x        ( 11 Oct 2018 10:12 )             21.8     10-10    149<H>  |  119<H>  |  26<H>  ----------------------------<  135<H>  4.4   |  23  |  1.40<H>    Ca    7.6<L>      10 Oct 2018 11:59  Phos  1.9     10-10  Mg     2.3     10-10    TPro  6.2  /  Alb  2.3<L>  /  TBili  0.6  /  DBili  x   /  AST  18  /  ALT  17  /  AlkPhos  81  10-10    PT/INR - ( 11 Oct 2018 07:08 )   PT: 28.7 sec;   INR: 2.58 ratio               RADIOLOGY & ADDITIONAL TESTS:

## 2018-10-12 NOTE — PROGRESS NOTE ADULT - PROBLEM SELECTOR PLAN 1
pt now on comfort measures  palliative care following  dc planning to possible hospice facility in progress pt now on comfort measures  palliative care following  pain control  bowel regimen as needed  dc planning to possible hospice facility in progress

## 2018-10-12 NOTE — PROGRESS NOTE ADULT - PROBLEM SELECTOR PLAN 1
multiple medical issues  overall failing health and state  on Morphine gtt for comfort measures, pain, and distress treatment  pt is a good candidate for [facility], needs CM and SW and Hospice coordination to reassess placement  prognosis very poor  pt is DNR DNI  will follow

## 2018-10-12 NOTE — PROGRESS NOTE ADULT - SUBJECTIVE AND OBJECTIVE BOX
Patient is a 73y old  Male who presents with a chief complaint of not feeling well (12 Oct 2018 13:17)      INTERVAL /OVERNIGHT EVENTS: alert awake, c/o nausea / vomiting    MEDICATIONS  (STANDING):  ALBUTerol    0.083% 2.5 milliGRAM(s) Nebulizer every 6 hours  cyanocobalamin Injectable 1000 MICROGram(s) IntraMuscular daily  folic acid 1 milliGRAM(s) Oral daily  morphine  Infusion 1 mG/Hr (1 mL/Hr) IV Continuous <Continuous>  nystatin Powder 1 Application(s) Topical two times a day  QUEtiapine 100 milliGRAM(s) Oral daily  QUEtiapine 100 milliGRAM(s) Oral at bedtime  venlafaxine XR. 150 milliGRAM(s) Oral daily    MEDICATIONS  (PRN):  aluminum hydroxide/magnesium hydroxide/simethicone Suspension 30 milliLiter(s) Oral every 6 hours PRN Dyspepsia  artificial  tears Solution 1 Drop(s) Both EYES four times a day PRN Dry Eyes  atropine 1% Ophthalmic Solution for SubLingual Use 2 Drop(s) SubLingual four times a day PRN oral secretions  bisacodyl 5 milliGRAM(s) Oral every 12 hours PRN Constipation  bisacodyl Suppository 10 milliGRAM(s) Rectal daily PRN Constipation  HYDROmorphone  Injectable 1 milliGRAM(s) IV Push every 90 minutes PRN distress, pain, dyspnea,  LORazepam   Injectable 1 milliGRAM(s) IV Push every 2 hours PRN dyspnea, distress, anxiety  ondansetron Injectable 4 milliGRAM(s) IV Push every 4 hours PRN Nausea  polyethylene glycol 3350 17 Gram(s) Oral daily PRN Constipation  promethazine Suppository 12.5 milliGRAM(s) Rectal four times a day PRN breakthrough nausea / vomiting  sodium chloride 0.65% Nasal 1 Spray(s) Both Nostrils four times a day PRN Nasal Congestion      Allergies    penicillin (Unknown)  strawberry (Unknown)    Intolerances        REVIEW OF SYSTEMS:  CONSTITUTIONAL: No fever, weight loss, or fatigue  EYES: No eye pain, visual disturbances, or discharge  ENMT:  No difficulty hearing, tinnitus, vertigo; No sinus or throat pain  NECK: No pain or stiffness  RESPIRATORY: No cough, wheezing, chills or hemoptysis; No shortness of breath  CARDIOVASCULAR: No chest pain, palpitations, dizziness, or leg swelling  GASTROINTESTINAL: No abdominal or epigastric pain. +  nausea, +vomiting, or hematemesis; No diarrhea or constipation. No melena or hematochezia.  GENITOURINARY: No dysuria, frequency, hematuria, or incontinence  NEUROLOGICAL: No headaches, memory loss, loss of strength, numbness, or tremors  SKIN: No itching, burning, rashes, or lesions   LYMPH NODES: No enlarged glands  ENDOCRINE: No heat or cold intolerance; No hair loss; No polydipsia or polyuria  MUSCULOSKELETAL: No joint pain or swelling; No muscle, back, or extremity pain  PSYCHIATRIC: No depression, anxiety, mood swings, or difficulty sleeping  HEME/LYMPH: No easy bruising, or bleeding gums  ALLERGY AND IMMUNOLOGIC: No hives or eczema    Vital Signs Last 24 Hrs  T(C): 36.9 (11 Oct 2018 15:58), Max: 36.9 (11 Oct 2018 15:58)  T(F): 98.4 (11 Oct 2018 15:58), Max: 98.4 (11 Oct 2018 15:58)  HR: 92 (12 Oct 2018 13:45) (90 - 96)  BP: 129/81 (11 Oct 2018 15:58) (129/81 - 129/81)  BP(mean): --  RR: 20 (11 Oct 2018 15:58) (20 - 20)  SpO2: 96% (12 Oct 2018 07:50) (93% - 97%)    PHYSICAL EXAM:  GENERAL: NAD, well-groomed, well-developed  HEAD:  Atraumatic, Normocephalic  EYES: EOMI, PERRLA, conjunctiva and sclera clear  ENMT: No tonsillar erythema, exudates, or enlargement; Moist mucous membranes, Good dentition, No lesions  NECK: Supple, No JVD, Normal thyroid  NERVOUS SYSTEM:  Alert & Oriented X3, Good concentration; Motor Strength 5/5 B/L upper and lower extremities; DTRs 2+ intact and symmetric  CHEST/LUNG: Clear to auscultation bilaterally; No rales, rhonchi, wheezing, or rubs  HEART: Regular rate and rhythm; No murmurs, rubs, or gallops  ABDOMEN: Soft, Nontender, Nondistended; Bowel sounds present  EXTREMITIES:  2+ Peripheral Pulses, No clubbing, cyanosis, or edema  LYMPH: No lymphadenopathy noted  SKIN: No rashes or lesions    LABS:          PT/INR - ( 11 Oct 2018 07:08 )   PT: 28.7 sec;   INR: 2.58 ratio             CAPILLARY BLOOD GLUCOSE          RADIOLOGY & ADDITIONAL TESTS:    Notes Reviewed:  [x ] YES  [ ] NO    Care Discussed with Consultants/Other Providers [x ] YES  [ ] NO

## 2018-10-12 NOTE — PROGRESS NOTE ADULT - SUBJECTIVE AND OBJECTIVE BOX
[INTERVAL HX: ]  Patient seen and examined;  Chart reviewed and events noted;   more lucid today.   Per sister, on palliative measures.   Discussed with sister, low Hgb, she understands likely continued bleeding.   Does not agree to transfusions at time.       MEDICATIONS  (STANDING):  ALBUTerol    0.083% 2.5 milliGRAM(s) Nebulizer every 6 hours  morphine  Infusion 1 mG/Hr (1 mL/Hr) IV Continuous <Continuous>  nystatin Powder 1 Application(s) Topical two times a day  QUEtiapine 100 milliGRAM(s) Oral daily  QUEtiapine 100 milliGRAM(s) Oral at bedtime  venlafaxine XR. 150 milliGRAM(s) Oral daily    MEDICATIONS  (PRN):  aluminum hydroxide/magnesium hydroxide/simethicone Suspension 30 milliLiter(s) Oral every 6 hours PRN Dyspepsia  artificial  tears Solution 1 Drop(s) Both EYES four times a day PRN Dry Eyes  atropine 1% Ophthalmic Solution for SubLingual Use 2 Drop(s) SubLingual four times a day PRN oral secretions  bisacodyl 5 milliGRAM(s) Oral every 12 hours PRN Constipation  bisacodyl Suppository 10 milliGRAM(s) Rectal daily PRN Constipation  HYDROmorphone  Injectable 1 milliGRAM(s) IV Push every 90 minutes PRN distress, pain, dyspnea,  LORazepam   Injectable 1 milliGRAM(s) IV Push every 2 hours PRN dyspnea, distress, anxiety  ondansetron Injectable 4 milliGRAM(s) IV Push every 4 hours PRN Nausea  polyethylene glycol 3350 17 Gram(s) Oral daily PRN Constipation  promethazine Suppository 12.5 milliGRAM(s) Rectal four times a day PRN breakthrough nausea / vomiting  sodium chloride 0.65% Nasal 1 Spray(s) Both Nostrils four times a day PRN Nasal Congestion      Vital Signs Last 24 Hrs  T(C): 36.9 (11 Oct 2018 15:58), Max: 36.9 (11 Oct 2018 15:58)  T(F): 98.4 (11 Oct 2018 15:58), Max: 98.4 (11 Oct 2018 15:58)  HR: 94 (12 Oct 2018 07:50) (89 - 96)  BP: 129/81 (11 Oct 2018 15:58) (129/81 - 129/81)  BP(mean): --  RR: 20 (11 Oct 2018 15:58) (20 - 20)  SpO2: 96% (12 Oct 2018 07:50) (93% - 97%)    [PHYSICAL EXAM]  General: adult in NAD,  bitemporal wasting,  WD. morbidly obese, mild odd oaffect  HEENT: clear oropharynx, anicteric sclera, pink conjunctivae.  Neck: supple, no masses.  CV: normal S1S2, no murmur, no rubs, no gallops.  Lungs: clear to auscultation, no wheezes, no rales, no rhonchi.  Abdomen: soft, non-tender, moderately-distended, no hepatosplenomegaly, normal BS, no guarding. Scattered scabs on abd and extremities  Ext: no clubbing, no cyanosis, + edema.  Skin: no rashes,  no petechiae, no venous stasis changes.  Neuro: alert and oriented X2, no focal motor deficits.  LN: no ZANE.      [LABS:]                        6.9    x     )-----------( x        ( 11 Oct 2018 10:12 )             21.8           PT/INR - ( 11 Oct 2018 07:08 )   PT: 28.7 sec;   INR: 2.58 ratio               [RADIOLOGY STUDIES:]

## 2018-10-12 NOTE — PROGRESS NOTE ADULT - SUBJECTIVE AND OBJECTIVE BOX
Date/Time Patient Seen:  		  Referring MD:   Data Reviewed	       Patient is a 73y old  Male who presents with a chief complaint of not feeling well (11 Oct 2018 17:40)    in bed  seen and examined  on morphine gtt    Subjective/HPI     PAST MEDICAL & SURGICAL HISTORY:  Myocardial infarct, old  Gastroesophageal reflux disease, esophagitis presence not specified  Other pulmonary embolism with acute cor pulmonale, unspecified chronicity  Acute congestive heart failure, unspecified heart failure type  Cellulitis, unspecified cellulitis site: abdomen buttocks  Candida infection  Acute congestive heart failure, unspecified heart failure type  Rheumatoid arthritis with positive rheumatoid factor, involving unspecified site  Primary osteoarthritis, unspecified site  Chronic atrial fibrillation  Bipolar 1 disorder  Angina at rest  COPD mixed type  Essential hypertension  Morbid obesity        Medication list         MEDICATIONS  (STANDING):  acetaminophen   Tablet .. 650 milliGRAM(s) Oral once  ALBUTerol    0.083% 2.5 milliGRAM(s) Nebulizer every 6 hours  diphenhydrAMINE 25 milliGRAM(s) Oral once  morphine  Infusion 1 mG/Hr (1 mL/Hr) IV Continuous <Continuous>  nystatin Powder 1 Application(s) Topical two times a day  QUEtiapine 100 milliGRAM(s) Oral daily  QUEtiapine 100 milliGRAM(s) Oral at bedtime  venlafaxine XR. 150 milliGRAM(s) Oral daily    MEDICATIONS  (PRN):  aluminum hydroxide/magnesium hydroxide/simethicone Suspension 30 milliLiter(s) Oral every 6 hours PRN Dyspepsia  artificial  tears Solution 1 Drop(s) Both EYES four times a day PRN Dry Eyes  atropine 1% Ophthalmic Solution for SubLingual Use 2 Drop(s) SubLingual four times a day PRN oral secretions  bisacodyl 5 milliGRAM(s) Oral every 12 hours PRN Constipation  bisacodyl Suppository 10 milliGRAM(s) Rectal daily PRN Constipation  HYDROmorphone  Injectable 1 milliGRAM(s) IV Push every 90 minutes PRN distress, pain, dyspnea,  LORazepam   Injectable 1 milliGRAM(s) IV Push every 2 hours PRN dyspnea, distress, anxiety  ondansetron Injectable 4 milliGRAM(s) IV Push every 6 hours PRN Nausea  polyethylene glycol 3350 17 Gram(s) Oral daily PRN Constipation  promethazine Suppository 12.5 milliGRAM(s) Rectal four times a day PRN breakthrough nausea / vomiting  sodium chloride 0.65% Nasal 1 Spray(s) Both Nostrils four times a day PRN Nasal Congestion         Vitals log        ICU Vital Signs Last 24 Hrs  T(C): 36.9 (11 Oct 2018 15:58), Max: 37.1 (11 Oct 2018 08:00)  T(F): 98.4 (11 Oct 2018 15:58), Max: 98.7 (11 Oct 2018 08:00)  HR: 92 (12 Oct 2018 02:01) (78 - 95)  BP: 129/81 (11 Oct 2018 15:58) (112/67 - 129/81)  BP(mean): --  ABP: --  ABP(mean): --  RR: 20 (11 Oct 2018 15:58) (20 - 20)  SpO2: 97% (12 Oct 2018 02:01) (93% - 97%)           Input and Output:  I&O's Detail    10 Oct 2018 07:01  -  11 Oct 2018 07:00  --------------------------------------------------------  IN:  Total IN: 0 mL    OUT:    Indwelling Catheter - Urethral: 800 mL  Total OUT: 800 mL    Total NET: -800 mL      11 Oct 2018 07:01  -  12 Oct 2018 06:32  --------------------------------------------------------  IN:  Total IN: 0 mL    OUT:    Indwelling Catheter - Urethral: 750 mL  Total OUT: 750 mL    Total NET: -750 mL          Lab Data                        6.9    x     )-----------( x        ( 11 Oct 2018 10:12 )             21.8     10-10    149<H>  |  119<H>  |  26<H>  ----------------------------<  135<H>  4.4   |  23  |  1.40<H>    Ca    7.6<L>      10 Oct 2018 11:59  Phos  1.9     10-10  Mg     2.3     10-10    TPro  6.2  /  Alb  2.3<L>  /  TBili  0.6  /  DBili  x   /  AST  18  /  ALT  17  /  AlkPhos  81  10-10    ABG - ( 10 Oct 2018 11:36 )  pH, Arterial: 7.34  pH, Blood: x     /  pCO2: 37    /  pO2: 89    / HCO3: 20    / Base Excess: -5.5  /  SaO2: 97                CARDIAC MARKERS ( 10 Oct 2018 19:18 )  <.015 ng/mL / x     / 167 U/L / x     / 4.4 ng/mL  CARDIAC MARKERS ( 10 Oct 2018 11:59 )  <.015 ng/mL / x     / 130 U/L / x     / 4.2 ng/mL        Review of Systems	      Objective     Physical Examination    heart s1s2  lung dec BS      Pertinent Lab findings & Imaging      Harjinder:  NO   Adequate UO     I&O's Detail    10 Oct 2018 07:01  -  11 Oct 2018 07:00  --------------------------------------------------------  IN:  Total IN: 0 mL    OUT:    Indwelling Catheter - Urethral: 800 mL  Total OUT: 800 mL    Total NET: -800 mL      11 Oct 2018 07:01  -  12 Oct 2018 06:32  --------------------------------------------------------  IN:  Total IN: 0 mL    OUT:    Indwelling Catheter - Urethral: 750 mL  Total OUT: 750 mL    Total NET: -750 mL               Discussed with:     Cultures:	        Radiology

## 2018-10-12 NOTE — DISCHARGE NOTE ADULT - CARE PROVIDER_API CALL
Clifford Greco), Critical Care Medicine; HospicePalliative Medicine; Internal Medicine; Pulmonary Disease  [address] [place] [address]  Phone: [phone]  Fax: [phone]

## 2018-10-12 NOTE — DISCHARGE NOTE ADULT - CARE PLAN
Principal Discharge DX:	Abdominal pain  Goal:	be comfortable and pain free  Assessment and plan of treatment:	comfort care / palliative care at family request  Secondary Diagnosis:	Agitation  Secondary Diagnosis:	Anemia  Secondary Diagnosis:	Encephalopathy, unspecified  Secondary Diagnosis:	Essential hypertension

## 2018-10-12 NOTE — DISCHARGE NOTE ADULT - MEDICATION SUMMARY - MEDICATIONS TO STOP TAKING
I will STOP taking the medications listed below when I get home from the hospital:    acetaminophen 500 mg oral tablet  -- 2 tab(s) by mouth every 8 hours    Coumadin  -- 1.5 milligram(s) by mouth once a day (at bedtime)    Robitussin Cold and Cough oral liquid  -- 5 milliliter(s) by mouth every 6 hours    Zithromax 250 mg oral tablet  -- orally once a day    gabapentin 600 mg oral tablet  -- 1 tab(s) by mouth 3 times a day    traMADol 50 mg oral tablet  -- orally every 6 hours, As Needed    methocarbamol 500 mg oral tablet  -- orally 2 times a day    Mucinex 600 mg oral tablet, extended release  -- 1 tab(s) by mouth every 12 hours    Imodium Multi-Symptom Relief 2 mg-125 mg oral tablet  -- orally 4 times a day, As Needed    Acidophilus  -- 100 milligram(s) by mouth 2 times a day    hydrALAZINE 50 mg oral tablet  -- orally every 8 hours    loperamide 2 mg oral capsule  -- orally every other day    Metoprolol Succinate ER  -- 400 milligram(s) by mouth once a day    potassium chloride 10 mEq oral capsule, extended release  -- 4 cap(s) by mouth 2 times a day    Deep Sea Nasal Spray 0.65% nasal spray  -- 2 spray(s) into nose 2 times a day

## 2018-10-12 NOTE — PROGRESS NOTE ADULT - ASSESSMENT
74 y/o man w morbid obesity, bipolar disorder, decubitus ulcers, resident of [facility], bedbound for 2 years, being w/u for diarrhea last few weeks, stopped eating/drinking/taking meds for 6weeks, brought in to ER after being seen by his daughter with symptoms of increased lethargy/abdomen pain/diarrhea. Was seen in [facility] 9/30 for similar findings, CT c/a/p only sig for enlarged prostate.  CT head no acute findings, CT a/p w bibasilar pneumonia.  Seen by Neuro/Cardiology/ID/Pulm/Psych, started on antibiotics    noted to have precipitous drop in H/H since 9/30(from 11.3 to 9.3 on  10/5 and 7.5 on 10/6) with peripheral blood smear morphology no sig pathology  C diff negative but stool is guaiac positive  CT a/p no hematoma  Post 1 unit PRBC on admission w appropriate incr of H/H,  iron studies c/w acute illness/inflammation  w/u severe B12 and folate deficiency  off Coumadin since admission    -mild thrombocytopenia likely due to B12/folate deficiency, adequate at present level, continue serial monitoring    GI is considering workup if HCP agrees and when medically optimized       Hgb 6.9 yesterday AM.     RECOMMEND  -continue B12 and folate repletion, follow serial CBC  Start B12 1000mcg SQ, Folic acid 1mg PO Daily.     Bleeding is from wounds and does not appear to be GI in etiology  recommended transfuse 1 unit PRBC yesterday, but family (sister) refused.   Discussed will likely continue to drop/decline.   Continue comfort measures.   Transfuse if pt / family agrees.

## 2018-10-12 NOTE — DISCHARGE NOTE ADULT - MEDICATION SUMMARY - MEDICATIONS TO TAKE
I will START or STAY ON the medications listed below when I get home from the hospital:    HYDROmorphone 0.1 mg/mL-NaCl 0.9% intravenous solution  --  intravenous   -- Indication: For =    morphine 0.5 mg/mL-NaCl 0.9% intravenous solution  --  intravenous   -- Indication: For =    aluminum hydroxide-magnesium hydroxide 200 mg-200 mg/5 mL oral suspension  -- 30 milliliter(s) by mouth every 6 hours, As needed, Dyspepsia  -- Indication: For =    LORazepam 2 mg/mL oral concentrate  -- 1 milliliter(s) by mouth 3 times a day  -- Indication: For =    venlafaxine 150 mg oral capsule, extended release  -- 1 cap(s) by mouth once a day  -- Indication: For =    promethazine 12.5 mg rectal suppository  -- 1 suppository(ies) rectally every 6 hours  -- Indication: For =    QUEtiapine 300 mg oral tablet  -- orally once a day (at bedtime)  -- Indication: For =    ipratropium-albuterol 0.5 mg-2.5 mg/3 mLinhalation solution  -- inhaled every 6 hours  -- Indication: For =    nystatin 100,000 units/g topical powder  -- 1 application on skin 2 times a day  -- Indication: For =    bisacodyl 10 mg rectal suppository  -- 1 suppository(ies) rectally once a day, As needed, Constipation  -- Indication: For =    atropine 1% ophthalmic ointment  -- 1 application to each affected eye 2 times a day  -- Indication: For =    ocular lubricant ophthalmic solution  -- 1 drop(s) to each affected eye 4 times a day, As needed, Dry Eyes  -- Indication: For =

## 2018-10-12 NOTE — PROGRESS NOTE ADULT - SUBJECTIVE AND OBJECTIVE BOX
[facility] -- Madai Farah, Lisa, Kalyan, Paul Wiley Savella  Office # [phone]      Follow Up:      Subjective/Observations:       REVIEW OF SYSTEMS: All other review of systems is negative unless indicated above    PAST MEDICAL & SURGICAL HISTORY:  Myocardial infarct, old  Gastroesophageal reflux disease, esophagitis presence not specified  Other pulmonary embolism with acute cor pulmonale, unspecified chronicity  Acute congestive heart failure, unspecified heart failure type  Cellulitis, unspecified cellulitis site: abdomen buttocks  Candida infection  Acute congestive heart failure, unspecified heart failure type  Rheumatoid arthritis with positive rheumatoid factor, involving unspecified site  Primary osteoarthritis, unspecified site  Chronic atrial fibrillation  Bipolar 1 disorder  Angina at rest  COPD mixed type  Essential hypertension  Morbid obesity      MEDICATIONS  (STANDING):  ALBUTerol    0.083% 2.5 milliGRAM(s) Nebulizer every 6 hours  morphine  Infusion 1 mG/Hr (1 mL/Hr) IV Continuous <Continuous>  nystatin Powder 1 Application(s) Topical two times a day  QUEtiapine 100 milliGRAM(s) Oral daily  QUEtiapine 100 milliGRAM(s) Oral at bedtime  venlafaxine XR. 150 milliGRAM(s) Oral daily    MEDICATIONS  (PRN):  aluminum hydroxide/magnesium hydroxide/simethicone Suspension 30 milliLiter(s) Oral every 6 hours PRN Dyspepsia  artificial  tears Solution 1 Drop(s) Both EYES four times a day PRN Dry Eyes  atropine 1% Ophthalmic Solution for SubLingual Use 2 Drop(s) SubLingual four times a day PRN oral secretions  bisacodyl 5 milliGRAM(s) Oral every 12 hours PRN Constipation  bisacodyl Suppository 10 milliGRAM(s) Rectal daily PRN Constipation  HYDROmorphone  Injectable 1 milliGRAM(s) IV Push every 90 minutes PRN distress, pain, dyspnea,  LORazepam   Injectable 1 milliGRAM(s) IV Push every 2 hours PRN dyspnea, distress, anxiety  ondansetron Injectable 4 milliGRAM(s) IV Push every 6 hours PRN Nausea  polyethylene glycol 3350 17 Gram(s) Oral daily PRN Constipation  promethazine Suppository 12.5 milliGRAM(s) Rectal four times a day PRN breakthrough nausea / vomiting  sodium chloride 0.65% Nasal 1 Spray(s) Both Nostrils four times a day PRN Nasal Congestion      Allergies    penicillin (Unknown)  strawberry (Unknown)    Intolerances            Vital Signs Last 24 Hrs  T(C): 36.9 (11 Oct 2018 15:58), Max: 36.9 (11 Oct 2018 15:58)  T(F): 98.4 (11 Oct 2018 15:58), Max: 98.4 (11 Oct 2018 15:58)  HR: 94 (12 Oct 2018 07:50) (89 - 96)  BP: 129/81 (11 Oct 2018 15:58) (129/81 - 129/81)  BP(mean): --  RR: 20 (11 Oct 2018 15:58) (20 - 20)  SpO2: 96% (12 Oct 2018 07:50) (93% - 97%)    I&O's Summary    11 Oct 2018 07:01  -  12 Oct 2018 07:00  --------------------------------------------------------  IN: 0 mL / OUT: 750 mL / NET: -750 mL          PHYSICAL EXAM:  TELE:   Constitutional: NAD, awake and alert, well-developed  HEENT: Moist Mucous Membranes, Anicteric  Pulmonary: Non-labored, breath sounds are clear bilaterally, No wheezing, rales or rhonchi  Cardiovascular: Regular, S1 and S2, No murmurs, rubs, gallops or clicks  Gastrointestinal: Bowel Sounds present, soft, nontender.   Lymph: No peripheral edema. No lymphadenopathy.  Skin: No visible rashes or ulcers.  Psych:  Mood & affect appropriate    LABS: All Labs Reviewed:                        6.9    x     )-----------( x        ( 11 Oct 2018 10:12 )             21.8                         7.7    4.47  )-----------( 106      ( 10 Oct 2018 11:59 )             24.5                         6.9    3.47  )-----------( 84       ( 10 Oct 2018 07:36 )             21.8     10 Oct 2018 11:59    149    |  119    |  26     ----------------------------<  135    4.4     |  23     |  1.40   10 Oct 2018 07:36    149    |  119    |  26     ----------------------------<  123    3.9     |  22     |  1.10     Ca    7.6        10 Oct 2018 11:59  Ca    7.3        10 Oct 2018 07:36  Phos  1.9       10 Oct 2018 11:59  Mg     2.3       10 Oct 2018 11:59    TPro  6.2    /  Alb  2.3    /  TBili  0.6    /  DBili  x      /  AST  18     /  ALT  17     /  AlkPhos  81     10 Oct 2018 11:59    PT/INR - ( 11 Oct 2018 07:08 )   PT: 28.7 sec;   INR: 2.58 ratio           CARDIAC MARKERS ( 10 Oct 2018 19:18 )  <.015 ng/mL / x     / 167 U/L / x     / 4.4 ng/mL           ECG:  < from: 12 Lead ECG (10.10.18 @ 11:33) >  Ventricular Rate 110 BPM    Atrial Rate 110 BPM    P-R Interval 170 ms    QRS Duration 82 ms    Q-T Interval 402 ms    QTC Calculation(Bezet) 544 ms    P Axis 42 degrees    R Axis 6 degrees    T Axis -53 degrees    Diagnosis Line Sinus tachycardia  APCs  ST & T wave abnormality, consider anterior ischemia  Prolonged QT  Abnormal ECG  Confirmed by Lisa CALDERON, Alberto ([id number]) on 10/10/2018 12:38:17 PM    < end of copied text >    Echo:  < from: TTE Echo Doppler w/o Cont (10.09.18 @ 15:16) >  EXAM:  ECHO TTE W/O CON COMP W/DOPPLR         PROCEDURE DATE:  10/09/2018        INTERPRETATION:  INDICATION: Abnormal EKG  Referring M.D.:Alamuri  Blood Pressure 110/60        Weight (kg) :181     Height (cm):172       BSA (sq m): 2.74  Technician: KATERINE    Dimensions:    LA 4.2       Normal Values: 2.0 - 4.0 cm    Ao 3.7        Normal Values: 2.0 - 3.8 cm  SEPTUM 1.3       Normal Values: 0.6 - 1.2 cm  PWT 1.3       Normal Values: 0.6 - 1.1 cm  LVIDd 4.0         Normal Values: 3.0 - 5.6 cm  LVIDs 3.2         Normal Values: 1.8 - 4.0 cm      OBSERVATIONS:    Mitral Valve: Mitral annular calcification with mild mitral regurgitation  Aortic Valve/Aorta: Mild aortic stenosis with a peak gradient of 23 mm in   mean gradient of 12 mm  Tricuspid Valve: Normal tricuspid valve. Trace     tricuspid   regurgitation.  Pulmonic Valve: The pulmonic valve is not well visualized. Probably   normal.  Left Atrium: Mild dilatation  Right Atrium: Normal  Left Ventricle: The endocardium is not well-visualized but there appears   to be mild left ventricular hypertrophy with normal global left   ventricular systolic function. The EF is approximately 50%.  Right Ventricle: Normal right ventricular size and function.  Pericardium/Pleura: No pericardial effusion noted.  Pulmonary/RV Pressure: The right ventricular systolic pressure is   estimated to be normal  LV Diastolic Function: Doppler evidence suggestive of diastolic   dysfunction    Conclusion: Technically limited study secondary to the patient's body   habitus and clinical condition   1 the endocardium is not well-visualized but there appears to be normal   global left ventricular systolic function with mild left ventricular   hypertrophy  2. Mitral annular calcification with mild mitral regurgitation  3. Mild aortic stenosis with a peak gradient of 23 mm in mean gradient of   12 mm  4. Doppler evidence suggestive of diastolic dysfunction                  ALBERTO VELASCO M.D., ATTENDING CARDIOLOGIST  This document has been electronically signed. Oct  9 2018  5:10PM                < end of copied text >               Alberto Dodd ANP   Cardiology [facility] -- Madai Farah, Lisa, Kalyan, Paul Wiley Savella  Office # [phone]      Follow Up:  htn, cad, hf, altered ms    Subjective/Observations: No events overnight resting comfortably in bed without complaints       REVIEW OF SYSTEMS: All other review of systems is negative unless indicated above    PAST MEDICAL & SURGICAL HISTORY:  Myocardial infarct, old  Gastroesophageal reflux disease, esophagitis presence not specified  Other pulmonary embolism with acute cor pulmonale, unspecified chronicity  Acute congestive heart failure, unspecified heart failure type  Cellulitis, unspecified cellulitis site: abdomen buttocks  Candida infection  Acute congestive heart failure, unspecified heart failure type  Rheumatoid arthritis with positive rheumatoid factor, involving unspecified site  Primary osteoarthritis, unspecified site  Chronic atrial fibrillation  Bipolar 1 disorder  Angina at rest  COPD mixed type  Essential hypertension  Morbid obesity      MEDICATIONS  (STANDING):  ALBUTerol    0.083% 2.5 milliGRAM(s) Nebulizer every 6 hours  morphine  Infusion 1 mG/Hr (1 mL/Hr) IV Continuous <Continuous>  nystatin Powder 1 Application(s) Topical two times a day  QUEtiapine 100 milliGRAM(s) Oral daily  QUEtiapine 100 milliGRAM(s) Oral at bedtime  venlafaxine XR. 150 milliGRAM(s) Oral daily    MEDICATIONS  (PRN):  aluminum hydroxide/magnesium hydroxide/simethicone Suspension 30 milliLiter(s) Oral every 6 hours PRN Dyspepsia  artificial  tears Solution 1 Drop(s) Both EYES four times a day PRN Dry Eyes  atropine 1% Ophthalmic Solution for SubLingual Use 2 Drop(s) SubLingual four times a day PRN oral secretions  bisacodyl 5 milliGRAM(s) Oral every 12 hours PRN Constipation  bisacodyl Suppository 10 milliGRAM(s) Rectal daily PRN Constipation  HYDROmorphone  Injectable 1 milliGRAM(s) IV Push every 90 minutes PRN distress, pain, dyspnea,  LORazepam   Injectable 1 milliGRAM(s) IV Push every 2 hours PRN dyspnea, distress, anxiety  ondansetron Injectable 4 milliGRAM(s) IV Push every 6 hours PRN Nausea  polyethylene glycol 3350 17 Gram(s) Oral daily PRN Constipation  promethazine Suppository 12.5 milliGRAM(s) Rectal four times a day PRN breakthrough nausea / vomiting  sodium chloride 0.65% Nasal 1 Spray(s) Both Nostrils four times a day PRN Nasal Congestion      Allergies    penicillin (Unknown)  strawberry (Unknown)    Intolerances            Vital Signs Last 24 Hrs  T(C): 36.9 (11 Oct 2018 15:58), Max: 36.9 (11 Oct 2018 15:58)  T(F): 98.4 (11 Oct 2018 15:58), Max: 98.4 (11 Oct 2018 15:58)  HR: 94 (12 Oct 2018 07:50) (89 - 96)  BP: 129/81 (11 Oct 2018 15:58) (129/81 - 129/81)  BP(mean): --  RR: 20 (11 Oct 2018 15:58) (20 - 20)  SpO2: 96% (12 Oct 2018 07:50) (93% - 97%)    I&O's Summary    11 Oct 2018 07:01  -  12 Oct 2018 07:00  --------------------------------------------------------  IN: 0 mL / OUT: 750 mL / NET: -750 mL          PHYSICAL EXAM:  TELE:   Constitutional: NAD, awake and alert, well-developed  HEENT: Moist Mucous Membranes, Anicteric  Pulmonary: Non-labored, breath sounds are clear bilaterally, No wheezing, rales or rhonchi  Cardiovascular: Regular, S1 and S2, No murmurs, rubs, gallops or clicks  Gastrointestinal: Bowel Sounds present, soft, nontender.   Lymph: No peripheral edema. No lymphadenopathy.  Skin: No visible rashes or ulcers.  Psych:  Mood & affect appropriate    LABS: All Labs Reviewed:                        6.9    x     )-----------( x        ( 11 Oct 2018 10:12 )             21.8                         7.7    4.47  )-----------( 106      ( 10 Oct 2018 11:59 )             24.5                         6.9    3.47  )-----------( 84       ( 10 Oct 2018 07:36 )             21.8     10 Oct 2018 11:59    149    |  119    |  26     ----------------------------<  135    4.4     |  23     |  1.40   10 Oct 2018 07:36    149    |  119    |  26     ----------------------------<  123    3.9     |  22     |  1.10     Ca    7.6        10 Oct 2018 11:59  Ca    7.3        10 Oct 2018 07:36  Phos  1.9       10 Oct 2018 11:59  Mg     2.3       10 Oct 2018 11:59    TPro  6.2    /  Alb  2.3    /  TBili  0.6    /  DBili  x      /  AST  18     /  ALT  17     /  AlkPhos  81     10 Oct 2018 11:59    PT/INR - ( 11 Oct 2018 07:08 )   PT: 28.7 sec;   INR: 2.58 ratio           CARDIAC MARKERS ( 10 Oct 2018 19:18 )  <.015 ng/mL / x     / 167 U/L / x     / 4.4 ng/mL           ECG:  < from: 12 Lead ECG (10.10.18 @ 11:33) >  Ventricular Rate 110 BPM    Atrial Rate 110 BPM    P-R Interval 170 ms    QRS Duration 82 ms    Q-T Interval 402 ms    QTC Calculation(Bezet) 544 ms    P Axis 42 degrees    R Axis 6 degrees    T Axis -53 degrees    Diagnosis Line Sinus tachycardia  APCs  ST & T wave abnormality, consider anterior ischemia  Prolonged QT  Abnormal ECG  Confirmed by Lisa CALDERON, Alberto ([id number]) on 10/10/2018 12:38:17 PM    < end of copied text >    Echo:  < from: TTE Echo Doppler w/o Cont (10.09.18 @ 15:16) >  EXAM:  ECHO TTE W/O CON COMP W/DOPPLR         PROCEDURE DATE:  10/09/2018        INTERPRETATION:  INDICATION: Abnormal EKG  Referring M.D.:Alamuri  Blood Pressure 110/60        Weight (kg) :181     Height (cm):172       BSA (sq m): 2.74  Technician: KATERINE    Dimensions:    LA 4.2       Normal Values: 2.0 - 4.0 cm    Ao 3.7        Normal Values: 2.0 - 3.8 cm  SEPTUM 1.3       Normal Values: 0.6 - 1.2 cm  PWT 1.3       Normal Values: 0.6 - 1.1 cm  LVIDd 4.0         Normal Values: 3.0 - 5.6 cm  LVIDs 3.2         Normal Values: 1.8 - 4.0 cm      OBSERVATIONS:    Mitral Valve: Mitral annular calcification with mild mitral regurgitation  Aortic Valve/Aorta: Mild aortic stenosis with a peak gradient of 23 mm in   mean gradient of 12 mm  Tricuspid Valve: Normal tricuspid valve. Trace     tricuspid   regurgitation.  Pulmonic Valve: The pulmonic valve is not well visualized. Probably   normal.  Left Atrium: Mild dilatation  Right Atrium: Normal  Left Ventricle: The endocardium is not well-visualized but there appears   to be mild left ventricular hypertrophy with normal global left   ventricular systolic function. The EF is approximately 50%.  Right Ventricle: Normal right ventricular size and function.  Pericardium/Pleura: No pericardial effusion noted.  Pulmonary/RV Pressure: The right ventricular systolic pressure is   estimated to be normal  LV Diastolic Function: Doppler evidence suggestive of diastolic   dysfunction    Conclusion: Technically limited study secondary to the patient's body   habitus and clinical condition   1 the endocardium is not well-visualized but there appears to be normal   global left ventricular systolic function with mild left ventricular   hypertrophy  2. Mitral annular calcification with mild mitral regurgitation  3. Mild aortic stenosis with a peak gradient of 23 mm in mean gradient of   12 mm  4. Doppler evidence suggestive of diastolic dysfunction                  ALBERTO VELASCO M.D., ATTENDING CARDIOLOGIST  This document has been electronically signed. Oct  9 2018  5:10PM                < end of copied text >               Alberto Dodd ANP   Cardiology

## 2018-10-13 NOTE — PROGRESS NOTE ADULT - SUBJECTIVE AND OBJECTIVE BOX
Patient is a 73y old  Male who presents with a chief complaint of not feeling well (13 Oct 2018 15:42)      INTERVAL /OVERNIGHT EVENTS: alert awake, still c/o pain    MEDICATIONS  (STANDING):  ALBUTerol    0.083% 2.5 milliGRAM(s) Nebulizer every 6 hours  cyanocobalamin Injectable 1000 MICROGram(s) IntraMuscular daily  fentaNYL   Patch  25 MICROgram(s)/Hr 1 Patch Transdermal every 72 hours  folic acid 1 milliGRAM(s) Oral daily  lidocaine   Patch 1 Patch Transdermal daily  morphine  Infusion 2 mG/Hr (2 mL/Hr) IV Continuous <Continuous>  nystatin Powder 1 Application(s) Topical two times a day  QUEtiapine 100 milliGRAM(s) Oral daily  QUEtiapine 100 milliGRAM(s) Oral at bedtime  venlafaxine XR. 150 milliGRAM(s) Oral daily    MEDICATIONS  (PRN):  aluminum hydroxide/magnesium hydroxide/simethicone Suspension 30 milliLiter(s) Oral every 6 hours PRN Dyspepsia  artificial  tears Solution 1 Drop(s) Both EYES four times a day PRN Dry Eyes  atropine 1% Ophthalmic Solution for SubLingual Use 2 Drop(s) SubLingual four times a day PRN oral secretions  bisacodyl 5 milliGRAM(s) Oral every 12 hours PRN Constipation  bisacodyl Suppository 10 milliGRAM(s) Rectal daily PRN Constipation  HYDROmorphone  Injectable 1 milliGRAM(s) IV Push every 90 minutes PRN distress, pain, dyspnea,  HYDROmorphone  Injectable 1 milliGRAM(s) IV Push three times a day PRN Mild Pain (1 - 3)  LORazepam   Injectable 1 milliGRAM(s) IV Push every 2 hours PRN dyspnea, distress, anxiety  morphine  - Injectable 2 milliGRAM(s) IV Push every 4 hours PRN breakthrough pain  ondansetron Injectable 4 milliGRAM(s) IV Push every 4 hours PRN Nausea  polyethylene glycol 3350 17 Gram(s) Oral daily PRN Constipation  promethazine Suppository 12.5 milliGRAM(s) Rectal four times a day PRN breakthrough nausea / vomiting      Allergies    penicillin (Unknown)  strawberry (Unknown)    Intolerances        REVIEW OF SYSTEMS: generalized pain    Vital Signs Last 24 Hrs  T(C): 36.1 (12 Oct 2018 21:56), Max: 36.1 (12 Oct 2018 21:56)  T(F): 97 (12 Oct 2018 21:56), Max: 97 (12 Oct 2018 21:56)  HR: 101 (12 Oct 2018 21:56) (101 - 101)  BP: 110/68 (12 Oct 2018 21:56) (110/68 - 110/68)  BP(mean): --  RR: 19 (12 Oct 2018 21:56) (19 - 19)  SpO2: 98% (12 Oct 2018 21:56) (98% - 98%)    PHYSICAL EXAM:  GENERAL: NAD, well-groomed, well-developed  HEAD:  Atraumatic, Normocephalic  EYES: EOMI, PERRLA, conjunctiva and sclera clear  ENMT: No tonsillar erythema, exudates, or enlargement; Moist mucous membranes, Good dentition, No lesions  NECK: Supple, No JVD, Normal thyroid  NERVOUS SYSTEM:  Alert & Oriented X3, Good concentration; Motor Strength 5/5 B/L upper and lower extremities; DTRs 2+ intact and symmetric  CHEST/LUNG: Clear to auscultation bilaterally; No rales, rhonchi, wheezing, or rubs  HEART: Regular rate and rhythm; No murmurs, rubs, or gallops  ABDOMEN: Soft, Nontender, Nondistended; Bowel sounds present  EXTREMITIES:  2+ Peripheral Pulses, No clubbing, cyanosis, + edema  LYMPH: No lymphadenopathy noted  SKIN: No rashes or lesions    LABS:              CAPILLARY BLOOD GLUCOSE          RADIOLOGY & ADDITIONAL TESTS:    Notes Reviewed:  [x ] YES  [ ] NO    Care Discussed with Consultants/Other Providers [x ] YES  [ ] NO

## 2018-10-13 NOTE — PROGRESS NOTE ADULT - SUBJECTIVE AND OBJECTIVE BOX
[INTERVAL HX: ]  Patient seen and examined;  Chart reviewed and events noted;   c/o diffuse achiness  no SOB    MEDICATIONS  (STANDING):  ALBUTerol    0.083% 2.5 milliGRAM(s) Nebulizer every 6 hours  cyanocobalamin Injectable 1000 MICROGram(s) IntraMuscular daily  folic acid 1 milliGRAM(s) Oral daily  morphine  Infusion 2 mG/Hr (2 mL/Hr) IV Continuous <Continuous>  nystatin Powder 1 Application(s) Topical two times a day  QUEtiapine 100 milliGRAM(s) Oral daily  QUEtiapine 100 milliGRAM(s) Oral at bedtime  venlafaxine XR. 150 milliGRAM(s) Oral daily    MEDICATIONS  (PRN):  aluminum hydroxide/magnesium hydroxide/simethicone Suspension 30 milliLiter(s) Oral every 6 hours PRN Dyspepsia  artificial  tears Solution 1 Drop(s) Both EYES four times a day PRN Dry Eyes  atropine 1% Ophthalmic Solution for SubLingual Use 2 Drop(s) SubLingual four times a day PRN oral secretions  bisacodyl 5 milliGRAM(s) Oral every 12 hours PRN Constipation  bisacodyl Suppository 10 milliGRAM(s) Rectal daily PRN Constipation  HYDROmorphone  Injectable 1 milliGRAM(s) IV Push every 90 minutes PRN distress, pain, dyspnea,  LORazepam   Injectable 1 milliGRAM(s) IV Push every 2 hours PRN dyspnea, distress, anxiety  ondansetron Injectable 4 milliGRAM(s) IV Push every 4 hours PRN Nausea  polyethylene glycol 3350 17 Gram(s) Oral daily PRN Constipation  promethazine Suppository 12.5 milliGRAM(s) Rectal four times a day PRN breakthrough nausea / vomiting      Vital Signs Last 24 Hrs  T(C): 36.1 (12 Oct 2018 21:56), Max: 36.8 (12 Oct 2018 16:01)  T(F): 97 (12 Oct 2018 21:56), Max: 98.2 (12 Oct 2018 16:01)  HR: 101 (12 Oct 2018 21:56) (92 - 102)  BP: 110/68 (12 Oct 2018 21:56) (106/60 - 110/68)  BP(mean): --  RR: 19 (12 Oct 2018 21:56) (19 - 19)  SpO2: 98% (12 Oct 2018 21:56) (96% - 98%)    [PHYSICAL EXAM]  General: adult in NAD,  WN,  WD. morbidly obese  HEENT: clear oropharynx, anicteric sclera, pink conjunctivae.  Neck: supple, no masses.  CV: normal S1S2, no murmur, no rubs, no gallops.  Lungs: clear to auscultation, no wheezes, no rales, no rhonchi.  Abdomen: soft, non-tender, non-distended, no hepatosplenomegaly, normal BS, no guarding.  Ext: no clubbing, no cyanosis, +edema. scattered bruises  Skin: no rashes,  no petechiae, no venous stasis changes.  Neuro: alert and oriented X2, no focal motor deficits.  LN: no ZANE.      [LABS:]                        7.3    x     )-----------( x        ( 12 Oct 2018 16:07 )             23.4                 [RADIOLOGY STUDIES:]

## 2018-10-13 NOTE — PROGRESS NOTE ADULT - ASSESSMENT
74 y/o man w morbid obesity, bipolar disorder, decubitus ulcers, resident of [facility], bedbound for 2 years, being w/u for diarrhea last few weeks, stopped eating/drinking/taking meds for 6weeks, brought in to ER after being seen by his daughter with symptoms of increased lethargy/abdomen pain/diarrhea. Was seen in [facility] 9/30 for similar findings, CT c/a/p only sig for enlarged prostate.  CT head no acute findings, CT a/p w bibasilar pneumonia.  Seen by Neuro/Cardiology/ID/Pulm/Psych, started on antibiotics    noted to have precipitous drop in H/H since 9/30(from 11.3 to 9.3 on  10/5 and 7.5 on 10/6) with peripheral blood smear morphology no sig pathology  C diff negative but stool is guaiac positive  CT a/p no hematoma  Post 1 unit PRBC on admission w appropriate incr of H/H,  iron studies c/w acute illness/inflammation  w/u severe B12 and folate deficiency  off Coumadin since admission    -mild thrombocytopenia likely due to B12/folate deficiency, adequate at present level, continue serial monitoring    GI is considering workup if HCP agrees and when medically optimized     Hgb 6.9 10/12/18, repeat 7.2    RECOMMEND  Continue B12 and folate repletion, follow serial CBC when family /patient permits  Start B12 1000mcg SQ, Folic acid 1mg PO Daily.     Bleeding is from wounds and does not appear to be GI in etiology.   Recommended transfuse 1 unit PRBC 10/12/18, but family (sister) refused.   Discussed will likely continue to drop/decline if continues to bleed.   Continue comfort measures.   Transfuse if pt / family agrees.

## 2018-10-13 NOTE — PROGRESS NOTE ADULT - ASSESSMENT
73 year old male with HTN, and documented MI and congestive heart failure, here with altered mental status.  Unclear etiology of symptoms though Likely all metabolic in origin. He has been starving himself for the last 2 weeks and has had significant diarrhea      #1 AMS   - No sign of acute ischemia. EKG is sinus tachycardia with APCs.   - tachy is likely reactive    #2 DVT/PE  - Coumadin on hold  - H/H trending down.  Transfuse to keep Hgb >7 if patient not going to hospice care.  Will need Lasix post transfusion    #3 HFpEF  - TTE showed preserved LVF with evidence of diastolic dysfunction but no significant valvular diseases.  - Hold off on diuresis at this time   - Watch creatinine and electrolytes. Keep K>4, Mg>2    #4 DIOR  - Creatinine is stable  - Avoid nephrotoxics    #5 GOC  - Pt is DNR/DNI  - Pt is followed by Palliative with supportive comfort measures in place  - Being evaluated for Hospice per CM note    Will sign off.  If situation changes may recall consult    Elizabeth Hathaway NP-C  Cardiology 73 year old male with HTN, and documented MI and congestive heart failure, here with altered mental status.  Unclear etiology of symptoms though Likely all metabolic in origin. He has been starving himself for the last 2 weeks and has had significant diarrhea      #1 AMS   - No sign of acute ischemia. EKG is sinus tachycardia with APCs.   - tachy is likely reactive    #2 DVT/PE  - Coumadin on hold  - H/H trending down.  To stop monitoring as patient on comfort care    #3 HFpEF  - TTE showed preserved LVF with evidence of diastolic dysfunction but no significant valvular diseases.  - Hold off on diuresis at this time   - Watch creatinine and electrolytes. Keep K>4, Mg>2    #4 DIOR  - Creatinine is stable  - Avoid nephrotoxics    #5 GOC  - Pt is DNR/DNI  - Pt is followed by Palliative with supportive comfort measures in place  - Being evaluated for Hospice per CM note    Will sign off.  If situation changes may recall consult    Elizabeth Hathaway, NP-C  Cardiology

## 2018-10-13 NOTE — GOALS OF CARE CONVERSATION - PERSONAL ADVANCE DIRECTIVE - CONVERSATION DETAILS
[facility]:  As per mtg w pt's sister, Natasha, on 10/12:  Sister informs pt has been resident at [facility] x past 13yrs.  Is bedbound, obese (>350#), Bipolar, multi-med comorbidities.  Sent to [facility] 10/5 for 1mth h/o diarrhea.  Adm'd for possible PNA.  Placed on Morphine drip to manage severe pain from multiple sacral decubiti (did not view the area, as takes 5 staff to turn pt & he screams in pain during wound care, per sister).     Per sister, as well as Anne DIAZ, yesterday pt appeared to be IP appropriate, as was not responsive.  [However, pt's brother  some time ago at the [facility], so transfering this pt there is not something pt or sister want to do.]   But, per sister, today pt's level of alertness is back to his usual, so she is re-thinking having declined allowing blood transfusion yesterday (for HGB=6.9).  Now would like to see how pt does over next few days before making any decision about opting for hospice care.

## 2018-10-13 NOTE — PROGRESS NOTE ADULT - SUBJECTIVE AND OBJECTIVE BOX
INTERVAL HPI/OVERNIGHT EVENTS:  pt seen and examined  resting in  bed, family at bedside, + bm x 2 per rn     MEDICATIONS  (STANDING):  ALBUTerol    0.083% 2.5 milliGRAM(s) Nebulizer every 6 hours  morphine  Infusion 1 mG/Hr (1 mL/Hr) IV Continuous <Continuous>  nystatin Powder 1 Application(s) Topical two times a day  QUEtiapine 100 milliGRAM(s) Oral daily  QUEtiapine 100 milliGRAM(s) Oral at bedtime  venlafaxine XR. 150 milliGRAM(s) Oral daily    MEDICATIONS  (PRN):  aluminum hydroxide/magnesium hydroxide/simethicone Suspension 30 milliLiter(s) Oral every 6 hours PRN Dyspepsia  artificial  tears Solution 1 Drop(s) Both EYES four times a day PRN Dry Eyes  atropine 1% Ophthalmic Solution for SubLingual Use 2 Drop(s) SubLingual four times a day PRN oral secretions  bisacodyl 5 milliGRAM(s) Oral every 12 hours PRN Constipation  bisacodyl Suppository 10 milliGRAM(s) Rectal daily PRN Constipation  HYDROmorphone  Injectable 1 milliGRAM(s) IV Push every 90 minutes PRN distress, pain, dyspnea,  LORazepam   Injectable 1 milliGRAM(s) IV Push every 2 hours PRN dyspnea, distress, anxiety  ondansetron Injectable 4 milliGRAM(s) IV Push every 6 hours PRN Nausea  polyethylene glycol 3350 17 Gram(s) Oral daily PRN Constipation  promethazine Suppository 12.5 milliGRAM(s) Rectal four times a day PRN breakthrough nausea / vomiting  sodium chloride 0.65% Nasal 1 Spray(s) Both Nostrils four times a day PRN Nasal Congestion      Allergies    penicillin (Unknown)  strawberry (Unknown)    Intolerances        Review of Systems:    unable to obtain      Vital Signs Last 24 Hrs  T(C): 36.1 (12 Oct 2018 21:56), Max: 36.8 (12 Oct 2018 16:01)  T(F): 97 (12 Oct 2018 21:56), Max: 98.2 (12 Oct 2018 16:01)  HR: 101 (12 Oct 2018 21:56) (92 - 102)  BP: 110/68 (12 Oct 2018 21:56) (106/60 - 110/68)  BP(mean): --  RR: 19 (12 Oct 2018 21:56) (19 - 19)  SpO2: 98% (12 Oct 2018 21:56) (96% - 98%)    PHYSICAL EXAM:  Constitutional: obese, in nad, lying in bed  HEENT: ncat  Neck: No LAD  Respiratory: dec bs  Cardiovascular: S1 and S2, RRR  Gastrointestinal: obese abd soft   Extremities: No peripheral edema  Vascular: 2+ peripheral pulses  Neurological: lethargic but arousable  Skin: No rashes        LABS:                                              7.3    x     )-----------( x        ( 12 Oct 2018 16:07 )             23.4           RADIOLOGY & ADDITIONAL TESTS:

## 2018-10-13 NOTE — GOALS OF CARE CONVERSATION - PERSONAL ADVANCE DIRECTIVE - NS PRO AD PATIENT TYPE ON CHART
molst completed 10/11/18/Health Care Proxy (HCP)/Do Not Resuscitate (DNR)/Medical Orders for Life-Sustaining Treatment (MOLST)

## 2018-10-13 NOTE — PROGRESS NOTE ADULT - SUBJECTIVE AND OBJECTIVE BOX
[facility] -- Madai Farah, Lisa, Kalyan, Paul Wiley Savella  Office # [phone]      Follow Up:  htn, cad, hf, altered ms    Subjective/Observations: Seen and examined.  Resting in bed lying comfortably with full comfort measures in place.  Lying flat NAD.  Currently receiving Morphine via pump.  Sister at bedside.         REVIEW OF SYSTEMS: All other review of systems is negative unless indicated above    PAST MEDICAL & SURGICAL HISTORY:  Myocardial infarct, old  Gastroesophageal reflux disease, esophagitis presence not specified  Other pulmonary embolism with acute cor pulmonale, unspecified chronicity  Acute congestive heart failure, unspecified heart failure type  Cellulitis, unspecified cellulitis site: abdomen buttocks  Candida infection  Acute congestive heart failure, unspecified heart failure type  Rheumatoid arthritis with positive rheumatoid factor, involving unspecified site  Primary osteoarthritis, unspecified site  Chronic atrial fibrillation  Bipolar 1 disorder  Angina at rest  COPD mixed type  Essential hypertension  Morbid obesity      MEDICATIONS  (STANDING):  ALBUTerol    0.083% 2.5 milliGRAM(s) Nebulizer every 6 hours  cyanocobalamin Injectable 1000 MICROGram(s) IntraMuscular daily  folic acid 1 milliGRAM(s) Oral daily  morphine  Infusion 2 mG/Hr (2 mL/Hr) IV Continuous <Continuous>  nystatin Powder 1 Application(s) Topical two times a day  QUEtiapine 100 milliGRAM(s) Oral daily  QUEtiapine 100 milliGRAM(s) Oral at bedtime  venlafaxine XR. 150 milliGRAM(s) Oral daily    MEDICATIONS  (PRN):  aluminum hydroxide/magnesium hydroxide/simethicone Suspension 30 milliLiter(s) Oral every 6 hours PRN Dyspepsia  artificial  tears Solution 1 Drop(s) Both EYES four times a day PRN Dry Eyes  atropine 1% Ophthalmic Solution for SubLingual Use 2 Drop(s) SubLingual four times a day PRN oral secretions  bisacodyl 5 milliGRAM(s) Oral every 12 hours PRN Constipation  bisacodyl Suppository 10 milliGRAM(s) Rectal daily PRN Constipation  HYDROmorphone  Injectable 1 milliGRAM(s) IV Push every 90 minutes PRN distress, pain, dyspnea,  LORazepam   Injectable 1 milliGRAM(s) IV Push every 2 hours PRN dyspnea, distress, anxiety  ondansetron Injectable 4 milliGRAM(s) IV Push every 4 hours PRN Nausea  polyethylene glycol 3350 17 Gram(s) Oral daily PRN Constipation  promethazine Suppository 12.5 milliGRAM(s) Rectal four times a day PRN breakthrough nausea / vomiting      Allergies    penicillin (Unknown)  strawberry (Unknown)    Intolerances            Vital Signs Last 24 Hrs  T(C): 36.1 (12 Oct 2018 21:56), Max: 36.8 (12 Oct 2018 16:01)  T(F): 97 (12 Oct 2018 21:56), Max: 98.2 (12 Oct 2018 16:01)  HR: 101 (12 Oct 2018 21:56) (94 - 102)  BP: 110/68 (12 Oct 2018 21:56) (106/60 - 110/68)  BP(mean): --  RR: 19 (12 Oct 2018 21:56) (19 - 19)  SpO2: 98% (12 Oct 2018 21:56) (96% - 98%)    I&O's Summary    12 Oct 2018 07:01  -  13 Oct 2018 07:00  --------------------------------------------------------  IN: 0 mL / OUT: 750 mL / NET: -750 mL    13 Oct 2018 07:01  -  13 Oct 2018 15:42  --------------------------------------------------------  IN: 0 mL / OUT: 600 mL / NET: -600 mL          PHYSICAL EXAM:  TELE: Not on tele  Constitutional: NAD, awake, lethargic, morbidly obese  HEENT: Moist Mucous Membranes, Anicteric  Pulmonary: Non-labored, breath sounds are clear anteriorly, No wheezing, rales or rhonchi  Cardiovascular: Regular, S1 and S2, No murmurs, rubs, gallops or clicks  Gastrointestinal: Bowel Sounds present, soft, nontender.   Lymph: + peripheral edema. No lymphadenopathy.  Skin: Poor skin turgor with multiple ecchymotic areas  Psych:  Mood & affect flat    LABS: All Labs Reviewed:                        7.3    x     )-----------( x        ( 12 Oct 2018 16:07 )             23.4                         6.9    x     )-----------( x        ( 11 Oct 2018 10:12 )             21.8     < from: 12 Lead ECG (10.10.18 @ 11:33) >  Ventricular Rate 110 BPM    Atrial Rate 110 BPM    P-R Interval 170 ms    QRS Duration 82 ms    Q-T Interval 402 ms    QTC Calculation(Bezet) 544 ms    P Axis 42 degrees    R Axis 6 degrees    T Axis -53 degrees    Diagnosis Line Sinus tachycardia  APCs  ST & T wave abnormality, consider anterior ischemia  Prolonged QT  Abnormal ECG  Confirmed by Alberto Gonzalez MD ([id number]) on 10/10/2018 12:38:17 PM    < end of copied text >    < from: TTE Echo Doppler w/o Cont (10.09.18 @ 15:16) >   EXAM:  ECHO TTE W/O CON COMP W/DOPPLR         PROCEDURE DATE:  10/09/2018        INTERPRETATION:  INDICATION: Abnormal EKG  Referring M.D.:Alamuri  Blood Pressure 110/60        Weight (kg) :181     Height (cm):172       BSA (sq m): 2.74  Technician: KATERINE    Dimensions:    LA 4.2       Normal Values: 2.0 - 4.0 cm    Ao 3.7        Normal Values: 2.0 - 3.8 cm  SEPTUM 1.3       Normal Values: 0.6 - 1.2 cm  PWT 1.3       Normal Values: 0.6 - 1.1 cm  LVIDd 4.0         Normal Values: 3.0 - 5.6 cm  LVIDs 3.2         Normal Values: 1.8 - 4.0 cm      OBSERVATIONS:    Mitral Valve: Mitral annular calcification with mild mitral regurgitation  Aortic Valve/Aorta: Mild aortic stenosis with a peak gradient of 23 mm in   mean gradient of 12 mm  Tricuspid Valve: Normal tricuspid valve. Trace     tricuspid   regurgitation.  Pulmonic Valve: The pulmonic valve is not well visualized. Probably   normal.  Left Atrium: Mild dilatation  Right Atrium: Normal  Left Ventricle: The endocardium is not well-visualized but there appears   to be mild left ventricular hypertrophy with normal global left   ventricular systolic function. The EF is approximately 50%.  Right Ventricle: Normal right ventricular size and function.  Pericardium/Pleura: No pericardial effusion noted.  Pulmonary/RV Pressure: The right ventricular systolic pressure is   estimated to be normal  LV Diastolic Function: Doppler evidence suggestive of diastolic   dysfunction    Conclusion: Technically limited study secondary to the patient's body   habitus and clinical condition   1 the endocardium is not well-visualized but there appears to be normal   global left ventricular systolic function with mild left ventricular   hypertrophy  2. Mitral annular calcification with mild mitral regurgitation  3. Mild aortic stenosis with a peak gradient of 23 mm in mean gradient of   12 mm  4. Doppler evidence suggestive of diastolic dysfunction                  ALBERTO GONZALEZ M.D., ATTENDING CARDIOLOGIST    < end of copied text >

## 2018-10-13 NOTE — PROGRESS NOTE ADULT - PROBLEM SELECTOR PLAN 1
pt now on comfort measures  palliative care following  pain control  bowel regimen as needed  dc planning to possible hospice facility in progress

## 2018-10-13 NOTE — PROGRESS NOTE ADULT - SUBJECTIVE AND OBJECTIVE BOX
Date/Time Patient Seen:  		  Referring MD:   Data Reviewed	       Patient is a 73y old  Male who presents with a chief complaint of not feeling well (12 Oct 2018 14:41)  in bed  seen and examined  vs and meds reviewed  reporting pain      Subjective/HPI     PAST MEDICAL & SURGICAL HISTORY:  Myocardial infarct, old  Gastroesophageal reflux disease, esophagitis presence not specified  Other pulmonary embolism with acute cor pulmonale, unspecified chronicity  Acute congestive heart failure, unspecified heart failure type  Cellulitis, unspecified cellulitis site: abdomen buttocks  Candida infection  Acute congestive heart failure, unspecified heart failure type  Rheumatoid arthritis with positive rheumatoid factor, involving unspecified site  Primary osteoarthritis, unspecified site  Chronic atrial fibrillation  Bipolar 1 disorder  Angina at rest  COPD mixed type  Essential hypertension  Morbid obesity        Medication list         MEDICATIONS  (STANDING):  ALBUTerol    0.083% 2.5 milliGRAM(s) Nebulizer every 6 hours  cyanocobalamin Injectable 1000 MICROGram(s) IntraMuscular daily  folic acid 1 milliGRAM(s) Oral daily  morphine  Infusion 1 mG/Hr (1 mL/Hr) IV Continuous <Continuous>  nystatin Powder 1 Application(s) Topical two times a day  QUEtiapine 100 milliGRAM(s) Oral daily  QUEtiapine 100 milliGRAM(s) Oral at bedtime  venlafaxine XR. 150 milliGRAM(s) Oral daily    MEDICATIONS  (PRN):  aluminum hydroxide/magnesium hydroxide/simethicone Suspension 30 milliLiter(s) Oral every 6 hours PRN Dyspepsia  artificial  tears Solution 1 Drop(s) Both EYES four times a day PRN Dry Eyes  atropine 1% Ophthalmic Solution for SubLingual Use 2 Drop(s) SubLingual four times a day PRN oral secretions  bisacodyl 5 milliGRAM(s) Oral every 12 hours PRN Constipation  bisacodyl Suppository 10 milliGRAM(s) Rectal daily PRN Constipation  HYDROmorphone  Injectable 1 milliGRAM(s) IV Push every 90 minutes PRN distress, pain, dyspnea,  LORazepam   Injectable 1 milliGRAM(s) IV Push every 2 hours PRN dyspnea, distress, anxiety  ondansetron Injectable 4 milliGRAM(s) IV Push every 4 hours PRN Nausea  polyethylene glycol 3350 17 Gram(s) Oral daily PRN Constipation  promethazine Suppository 12.5 milliGRAM(s) Rectal four times a day PRN breakthrough nausea / vomiting  sodium chloride 0.65% Nasal 1 Spray(s) Both Nostrils four times a day PRN Nasal Congestion         Vitals log        ICU Vital Signs Last 24 Hrs  T(C): 36.1 (12 Oct 2018 21:56), Max: 36.8 (12 Oct 2018 16:01)  T(F): 97 (12 Oct 2018 21:56), Max: 98.2 (12 Oct 2018 16:01)  HR: 101 (12 Oct 2018 21:56) (92 - 102)  BP: 110/68 (12 Oct 2018 21:56) (106/60 - 110/68)  BP(mean): --  ABP: --  ABP(mean): --  RR: 19 (12 Oct 2018 21:56) (19 - 19)  SpO2: 98% (12 Oct 2018 21:56) (96% - 98%)           Input and Output:  I&O's Detail    12 Oct 2018 07:01  -  13 Oct 2018 07:00  --------------------------------------------------------  IN:  Total IN: 0 mL    OUT:    Indwelling Catheter - Urethral: 750 mL  Total OUT: 750 mL    Total NET: -750 mL          Lab Data                        7.3    x     )-----------( x        ( 12 Oct 2018 16:07 )             23.4                   Review of Systems	      Objective     Physical Examination    heart s1s2  lung dec BS  abd soft      Pertinent Lab findings & Imaging      Harjinder:  NO   Adequate UO     I&O's Detail    12 Oct 2018 07:01  -  13 Oct 2018 07:00  --------------------------------------------------------  IN:  Total IN: 0 mL    OUT:    Indwelling Catheter - Urethral: 750 mL  Total OUT: 750 mL    Total NET: -750 mL               Discussed with:     Cultures:	        Radiology

## 2018-10-13 NOTE — PROGRESS NOTE ADULT - PROBLEM SELECTOR PLAN 1
spoke with sister this am  spoke with patient this am  pt cont to have pain  will increase Morphine gtt rate, discussed changes with pt and sister  pt is DNR  no transfusion for now  supportive medical comfort regimen, will follow    pt is DNR DNI

## 2018-10-14 NOTE — PROGRESS NOTE ADULT - SUBJECTIVE AND OBJECTIVE BOX
Patient is a 73y old  Male who presents with a chief complaint of not feeling well (14 Oct 2018 12:44)      INTERVAL /OVERNIGHT EVENTS: resting comfortably    MEDICATIONS  (STANDING):  ALBUTerol    0.083% 2.5 milliGRAM(s) Nebulizer every 6 hours  cyanocobalamin Injectable 1000 MICROGram(s) IntraMuscular daily  fentaNYL   Patch  25 MICROgram(s)/Hr 1 Patch Transdermal every 72 hours  folic acid 1 milliGRAM(s) Oral daily  lidocaine   Patch 1 Patch Transdermal daily  metoclopramide Injectable 5 milliGRAM(s) IV Push every 8 hours  morphine  Infusion 2 mG/Hr (2 mL/Hr) IV Continuous <Continuous>  multivitamin 1 Tablet(s) Oral daily  nystatin Powder 1 Application(s) Topical two times a day  QUEtiapine 100 milliGRAM(s) Oral daily  QUEtiapine 100 milliGRAM(s) Oral at bedtime  venlafaxine XR. 150 milliGRAM(s) Oral daily    MEDICATIONS  (PRN):  aluminum hydroxide/magnesium hydroxide/simethicone Suspension 30 milliLiter(s) Oral every 6 hours PRN Dyspepsia  artificial  tears Solution 1 Drop(s) Both EYES four times a day PRN Dry Eyes  atropine 1% Ophthalmic Solution for SubLingual Use 2 Drop(s) SubLingual four times a day PRN oral secretions  bisacodyl 5 milliGRAM(s) Oral every 12 hours PRN Constipation  bisacodyl Suppository 10 milliGRAM(s) Rectal daily PRN Constipation  HYDROmorphone  Injectable 1 milliGRAM(s) IV Push every 90 minutes PRN distress, pain, dyspnea,  HYDROmorphone  Injectable 1 milliGRAM(s) IV Push three times a day PRN Mild Pain (1 - 3)  LORazepam   Injectable 1 milliGRAM(s) IV Push every 2 hours PRN dyspnea, distress, anxiety  magnesium hydroxide Suspension 30 milliLiter(s) Oral daily PRN Constipation  morphine  - Injectable 2 milliGRAM(s) IV Push every 4 hours PRN breakthrough pain  ondansetron Injectable 4 milliGRAM(s) IV Push every 4 hours PRN Nausea  polyethylene glycol 3350 17 Gram(s) Oral daily PRN Constipation  promethazine 25 milliGRAM(s) Oral four times a day PRN breakthrough nausea / vomiting      Allergies    penicillin (Unknown)  strawberry (Unknown)    Intolerances        REVIEW OF SYSTEMS: unable to obtain    Vital Signs Last 24 Hrs  T(C): --  T(F): --  HR: 80 (14 Oct 2018 14:07) (78 - 90)  BP: --  BP(mean): --  RR: --  SpO2: 96% (14 Oct 2018 01:05) (96% - 97%)    PHYSICAL EXAM:  GENERAL: NAD, well-groomed, well-developed  HEAD:  Atraumatic, Normocephalic  EYES: EOMI, PERRLA, conjunctiva and sclera clear  ENMT: No tonsillar erythema, exudates, or enlargement; Moist mucous membranes, Good dentition, No lesions  NECK: Supple, No JVD, Normal thyroid  NERVOUS SYSTEM:  Alert & awake,  Motor Strength 5/5 B/L upper and lower extremities; DTRs 2+ intact and symmetric  CHEST/LUNG: Clear to auscultation bilaterally; No rales, rhonchi, wheezing, or rubs  HEART: Regular rate and rhythm; No murmurs, rubs, or gallops  ABDOMEN: Soft, Nontender, Nondistended; Bowel sounds present  EXTREMITIES:  2+ Peripheral Pulses, No clubbing, cyanosis, or edema  LYMPH: No lymphadenopathy noted  SKIN: No rashes or lesions, multiple wounds    LABS:              CAPILLARY BLOOD GLUCOSE          RADIOLOGY & ADDITIONAL TESTS:    Notes Reviewed:  [x ] YES  [ ] NO    Care Discussed with Consultants/Other Providers [x ] YES  [ ] NO

## 2018-10-14 NOTE — PROGRESS NOTE ADULT - SUBJECTIVE AND OBJECTIVE BOX
Date/Time Patient Seen:  		  Referring MD:   Data Reviewed	       Patient is a 73y old  Male who presents with a chief complaint of not feeling well (13 Oct 2018 20:16)  in bed  seen and examined  vs and meds reviewed  on opioids for pain regimen  awake  verbal      Subjective/HPI     PAST MEDICAL & SURGICAL HISTORY:  Myocardial infarct, old  Gastroesophageal reflux disease, esophagitis presence not specified  Other pulmonary embolism with acute cor pulmonale, unspecified chronicity  Acute congestive heart failure, unspecified heart failure type  Cellulitis, unspecified cellulitis site: abdomen buttocks  Candida infection  Acute congestive heart failure, unspecified heart failure type  Rheumatoid arthritis with positive rheumatoid factor, involving unspecified site  Primary osteoarthritis, unspecified site  Chronic atrial fibrillation  Bipolar 1 disorder  Angina at rest  COPD mixed type  Essential hypertension  Morbid obesity        Medication list         MEDICATIONS  (STANDING):  ALBUTerol    0.083% 2.5 milliGRAM(s) Nebulizer every 6 hours  cyanocobalamin Injectable 1000 MICROGram(s) IntraMuscular daily  fentaNYL   Patch  25 MICROgram(s)/Hr 1 Patch Transdermal every 72 hours  folic acid 1 milliGRAM(s) Oral daily  lidocaine   Patch 1 Patch Transdermal daily  metoclopramide Injectable 5 milliGRAM(s) IV Push every 8 hours  morphine  Infusion 2 mG/Hr (2 mL/Hr) IV Continuous <Continuous>  nystatin Powder 1 Application(s) Topical two times a day  QUEtiapine 100 milliGRAM(s) Oral daily  QUEtiapine 100 milliGRAM(s) Oral at bedtime  venlafaxine XR. 150 milliGRAM(s) Oral daily    MEDICATIONS  (PRN):  aluminum hydroxide/magnesium hydroxide/simethicone Suspension 30 milliLiter(s) Oral every 6 hours PRN Dyspepsia  artificial  tears Solution 1 Drop(s) Both EYES four times a day PRN Dry Eyes  atropine 1% Ophthalmic Solution for SubLingual Use 2 Drop(s) SubLingual four times a day PRN oral secretions  bisacodyl 5 milliGRAM(s) Oral every 12 hours PRN Constipation  bisacodyl Suppository 10 milliGRAM(s) Rectal daily PRN Constipation  HYDROmorphone  Injectable 1 milliGRAM(s) IV Push every 90 minutes PRN distress, pain, dyspnea,  HYDROmorphone  Injectable 1 milliGRAM(s) IV Push three times a day PRN Mild Pain (1 - 3)  LORazepam   Injectable 1 milliGRAM(s) IV Push every 2 hours PRN dyspnea, distress, anxiety  morphine  - Injectable 2 milliGRAM(s) IV Push every 4 hours PRN breakthrough pain  ondansetron Injectable 4 milliGRAM(s) IV Push every 4 hours PRN Nausea  polyethylene glycol 3350 17 Gram(s) Oral daily PRN Constipation  promethazine Suppository 12.5 milliGRAM(s) Rectal four times a day PRN breakthrough nausea / vomiting         Vitals log        ICU Vital Signs Last 24 Hrs  T(C): --  T(F): --  HR: 79 (14 Oct 2018 01:05) (79 - 90)  BP: --  BP(mean): --  ABP: --  ABP(mean): --  RR: --  SpO2: 96% (14 Oct 2018 01:05) (96% - 97%)           Input and Output:  I&O's Detail    13 Oct 2018 07:01  -  14 Oct 2018 07:00  --------------------------------------------------------  IN:    morphine  Infusion: 46 mL    Oral Fluid: 50 mL  Total IN: 96 mL    OUT:    Indwelling Catheter - Urethral: 650 mL  Total OUT: 650 mL    Total NET: -554 mL          Lab Data                        7.3    x     )-----------( x        ( 12 Oct 2018 16:07 )             23.4                   Review of Systems	      Objective     Physical Examination        Pertinent Lab findings & Imaging      Grande:  NO   Adequate UO     I&O's Detail    13 Oct 2018 07:01  -  14 Oct 2018 07:00  --------------------------------------------------------  IN:    morphine  Infusion: 46 mL    Oral Fluid: 50 mL  Total IN: 96 mL    OUT:    Indwelling Catheter - Urethral: 650 mL  Total OUT: 650 mL    Total NET: -554 mL               Discussed with:     Cultures:	        Radiology

## 2018-10-14 NOTE — PROGRESS NOTE ADULT - SUBJECTIVE AND OBJECTIVE BOX
[INTERVAL HX: ]  Patient seen and examined;  Chart reviewed and events noted;   No labs.   Sister at bedside.   no change in decision on palliative measures      MEDICATIONS  (STANDING):  ALBUTerol    0.083% 2.5 milliGRAM(s) Nebulizer every 6 hours  cyanocobalamin Injectable 1000 MICROGram(s) IntraMuscular daily  fentaNYL   Patch  25 MICROgram(s)/Hr 1 Patch Transdermal every 72 hours  folic acid 1 milliGRAM(s) Oral daily  lidocaine   Patch 1 Patch Transdermal daily  metoclopramide Injectable 5 milliGRAM(s) IV Push every 8 hours  morphine  Infusion 2 mG/Hr (2 mL/Hr) IV Continuous <Continuous>  multivitamin 1 Tablet(s) Oral daily  nystatin Powder 1 Application(s) Topical two times a day  QUEtiapine 100 milliGRAM(s) Oral daily  QUEtiapine 100 milliGRAM(s) Oral at bedtime  venlafaxine XR. 150 milliGRAM(s) Oral daily    MEDICATIONS  (PRN):  aluminum hydroxide/magnesium hydroxide/simethicone Suspension 30 milliLiter(s) Oral every 6 hours PRN Dyspepsia  artificial  tears Solution 1 Drop(s) Both EYES four times a day PRN Dry Eyes  atropine 1% Ophthalmic Solution for SubLingual Use 2 Drop(s) SubLingual four times a day PRN oral secretions  bisacodyl 5 milliGRAM(s) Oral every 12 hours PRN Constipation  bisacodyl Suppository 10 milliGRAM(s) Rectal daily PRN Constipation  HYDROmorphone  Injectable 1 milliGRAM(s) IV Push every 90 minutes PRN distress, pain, dyspnea,  HYDROmorphone  Injectable 1 milliGRAM(s) IV Push three times a day PRN Mild Pain (1 - 3)  LORazepam   Injectable 1 milliGRAM(s) IV Push every 2 hours PRN dyspnea, distress, anxiety  magnesium hydroxide Suspension 30 milliLiter(s) Oral daily PRN Constipation  morphine  - Injectable 2 milliGRAM(s) IV Push every 4 hours PRN breakthrough pain  ondansetron Injectable 4 milliGRAM(s) IV Push every 4 hours PRN Nausea  polyethylene glycol 3350 17 Gram(s) Oral daily PRN Constipation  promethazine 25 milliGRAM(s) Oral four times a day PRN breakthrough nausea / vomiting      Vital Signs Last 24 Hrs  T(C): --  T(F): --  HR: 78 (14 Oct 2018 07:54) (78 - 90)  BP: --  BP(mean): --  RR: --  SpO2: 96% (14 Oct 2018 01:05) (96% - 97%)    [PHYSICAL EXAM]  General: adult in NAD,  WN,  WD. morbidly obese  HEENT: clear oropharynx, anicteric sclera, pink conjunctivae.  Neck: supple, no masses.  CV: normal S1S2, no murmur, no rubs, no gallops.  Lungs: clear to auscultation, no wheezes, no rales, no rhonchi.  Abdomen: soft, non-tender, moderately -distended-obese, no hepatosplenomegaly, normal BS, no guarding.  Ext: no clubbing, no cyanosis, no edema.  Skin: no rashes,  no petechiae, no venous stasis changes.  Neuro: alert and oriented X2, no focal motor deficits.  LN: no ZANE.      [LABS:]                        7.3    x     )-----------( x        ( 12 Oct 2018 16:07 )             23.4                 [RADIOLOGY STUDIES:]

## 2018-10-14 NOTE — PROGRESS NOTE ADULT - ASSESSMENT
72 y/o man w morbid obesity, bipolar disorder, decubitus ulcers, resident of [facility], bedbound for 2 years, being w/u for diarrhea last few weeks, stopped eating/drinking/taking meds for 6weeks, brought in to ER after being seen by his daughter with symptoms of increased lethargy/abdomen pain/diarrhea. Was seen in [facility] 9/30 for similar findings, CT c/a/p only sig for enlarged prostate.  CT head no acute findings, CT a/p w bibasilar pneumonia.  Seen by Neuro/Cardiology/ID/Pulm/Psych, started on antibiotics    noted to have precipitous drop in H/H since 9/30(from 11.3 to 9.3 on  10/5 and 7.5 on 10/6) with peripheral blood smear morphology no sig pathology  C diff negative but stool is guaiac positive  CT a/p no hematoma  Post 1 unit PRBC on admission w appropriate incr of H/H,  iron studies c/w acute illness/inflammation  w/u severe B12 and folate deficiency  off Coumadin since admission    -mild thrombocytopenia likely due to B12/folate deficiency, adequate at present level, continue serial monitoring  GI offered workup if HCP agrees and when medically optimized     Hgb 6.9 10/12/18, repeat 7.2    RECOMMEND  Continue B12 and folate repletion, follow serial CBC when family /patient permits  Start B12 1000mcg SQ, Folic acid 1mg PO Daily.     Recommended transfuse 1 unit PRBC 10/12/18, but family (sister) refused. No change in decision.   Discussed will likely continue to drop/decline if continues to bleed.   Continue comfort measures.   No additional recommendations, will sign off case for now. Reconsult as needed  Transfuse if pt / family agrees.

## 2018-10-14 NOTE — PROGRESS NOTE ADULT - SUBJECTIVE AND OBJECTIVE BOX
INTERVAL HPI/OVERNIGHT EVENTS:  pt seen and examined  resting in  bed, family at bedside, comfort care     MEDICATIONS  (STANDING):  ALBUTerol    0.083% 2.5 milliGRAM(s) Nebulizer every 6 hours  morphine  Infusion 1 mG/Hr (1 mL/Hr) IV Continuous <Continuous>  nystatin Powder 1 Application(s) Topical two times a day  QUEtiapine 100 milliGRAM(s) Oral daily  QUEtiapine 100 milliGRAM(s) Oral at bedtime  venlafaxine XR. 150 milliGRAM(s) Oral daily    MEDICATIONS  (PRN):  aluminum hydroxide/magnesium hydroxide/simethicone Suspension 30 milliLiter(s) Oral every 6 hours PRN Dyspepsia  artificial  tears Solution 1 Drop(s) Both EYES four times a day PRN Dry Eyes  atropine 1% Ophthalmic Solution for SubLingual Use 2 Drop(s) SubLingual four times a day PRN oral secretions  bisacodyl 5 milliGRAM(s) Oral every 12 hours PRN Constipation  bisacodyl Suppository 10 milliGRAM(s) Rectal daily PRN Constipation  HYDROmorphone  Injectable 1 milliGRAM(s) IV Push every 90 minutes PRN distress, pain, dyspnea,  LORazepam   Injectable 1 milliGRAM(s) IV Push every 2 hours PRN dyspnea, distress, anxiety  ondansetron Injectable 4 milliGRAM(s) IV Push every 6 hours PRN Nausea  polyethylene glycol 3350 17 Gram(s) Oral daily PRN Constipation  promethazine Suppository 12.5 milliGRAM(s) Rectal four times a day PRN breakthrough nausea / vomiting  sodium chloride 0.65% Nasal 1 Spray(s) Both Nostrils four times a day PRN Nasal Congestion      Allergies    penicillin (Unknown)  strawberry (Unknown)    Intolerances        Review of Systems:    unable to obtain      Vital Signs Last 24 Hrs  T(C): --  T(F): --  HR: 78 (14 Oct 2018 07:54) (78 - 90)  BP: --  BP(mean): --  RR: --  SpO2: 96% (14 Oct 2018 01:05) (96% - 97%)      PHYSICAL EXAM:  Constitutional: obese, in nad, lying in bed  HEENT: ncat  Neck: No LAD  Respiratory: dec bs  Cardiovascular: S1 and S2, RRR  Gastrointestinal: obese abd soft   Neurological: lethargic but arousable  Skin: No rashes        LABS:                                    no new labs    RADIOLOGY & ADDITIONAL TESTS:

## 2018-10-14 NOTE — PROGRESS NOTE ADULT - PROBLEM SELECTOR PLAN 1
acute and chronic pain  multiple medical issues  pt is DNR DNI  frail and weak with limited functional capacity  reports that pain is better controlled now  cont on Morphine gtt, dilaudid PRN, fentanyl patch low dose  oral hygiene  skin care  assist with ADL  supportive palliative care and regimen  hospice eval planned for tomorrow

## 2018-10-15 NOTE — PROGRESS NOTE ADULT - PROBLEM SELECTOR PLAN 1
multi organ dysfunction  oral and skin hygiene  assist with ADL  monitor sx and distress  cont Morphine gtt, cont oral hygiene  Atropine PRN for Oral secretions, will hold off on Scopolamine, long acting agent - may cause delirium and confusion  will follow and monitor  prognosis poor  pt is DNR DNI  discussed with sister

## 2018-10-15 NOTE — PROGRESS NOTE ADULT - PROBLEM SELECTOR PLAN 1
pt on comfort measures  palliative care following  pain control  bowel regimen as needed  dc planning to possible hospice facility in progress

## 2018-10-15 NOTE — PROGRESS NOTE ADULT - SUBJECTIVE AND OBJECTIVE BOX
INTERVAL HPI/OVERNIGHT EVENTS:  pt resting in bed  on comfort measures    MEDICATIONS  (STANDING):  ALBUTerol    0.083% 2.5 milliGRAM(s) Nebulizer every 6 hours  cyanocobalamin Injectable 1000 MICROGram(s) IntraMuscular daily  fentaNYL   Patch  25 MICROgram(s)/Hr 1 Patch Transdermal every 72 hours  folic acid 1 milliGRAM(s) Oral daily  lidocaine   Patch 1 Patch Transdermal daily  metoclopramide Injectable 5 milliGRAM(s) IV Push every 8 hours  morphine  Infusion 2 mG/Hr (2 mL/Hr) IV Continuous <Continuous>  multivitamin 1 Tablet(s) Oral daily  nystatin Powder 1 Application(s) Topical two times a day  QUEtiapine 100 milliGRAM(s) Oral daily  QUEtiapine 100 milliGRAM(s) Oral at bedtime  venlafaxine XR. 150 milliGRAM(s) Oral daily    MEDICATIONS  (PRN):  aluminum hydroxide/magnesium hydroxide/simethicone Suspension 30 milliLiter(s) Oral every 6 hours PRN Dyspepsia  artificial  tears Solution 1 Drop(s) Both EYES four times a day PRN Dry Eyes  atropine 1% Ophthalmic Solution for SubLingual Use 2 Drop(s) SubLingual four times a day PRN oral secretions  bisacodyl 5 milliGRAM(s) Oral every 12 hours PRN Constipation  bisacodyl Suppository 10 milliGRAM(s) Rectal daily PRN Constipation  HYDROmorphone  Injectable 1 milliGRAM(s) IV Push every 90 minutes PRN distress, pain, dyspnea,  HYDROmorphone  Injectable 1 milliGRAM(s) IV Push three times a day PRN Mild Pain (1 - 3)  LORazepam   Injectable 1 milliGRAM(s) IV Push every 2 hours PRN dyspnea, distress, anxiety  magnesium hydroxide Suspension 30 milliLiter(s) Oral daily PRN Constipation  morphine  - Injectable 2 milliGRAM(s) IV Push every 4 hours PRN breakthrough pain  ondansetron Injectable 4 milliGRAM(s) IV Push every 4 hours PRN Nausea  polyethylene glycol 3350 17 Gram(s) Oral daily PRN Constipation  promethazine 25 milliGRAM(s) Oral four times a day PRN breakthrough nausea / vomiting      Allergies    penicillin (Unknown)  strawberry (Unknown)    Intolerances        Review of Systems:  unable to obtain      Vital Signs Last 24 Hrs  T(C): --  T(F): --  HR: 96 (15 Oct 2018 00:59) (80 - 101)  BP: --  BP(mean): --  RR: --  SpO2: 96% (15 Oct 2018 00:59) (92% - 96%)    PHYSICAL EXAM:  Constitutional: obese, in nad, lying in bed  HEENT: ncat  Gastrointestinal: obese abd soft   Neurological: lethargic but arousable        LABS:                RADIOLOGY & ADDITIONAL TESTS:

## 2018-10-15 NOTE — PROGRESS NOTE ADULT - ATTENDING COMMENTS
Seen/examined. Agree with above
Seen/examined. Agree with above.
Chart reviewed    Patient seen and examined    Agree with plan as outlined above
I personally saw and examined the patient in detail.  I have spoken to the above provider regarding the assessment and plan of care.  I reviewed the above assessment and plan of care, and agree.  I have made changes in the body of the note where appropriate.
I personally saw and examined the patient in detail.  I have spoken to the above provider regarding the assessment and plan of care.  I reviewed the above assessment and plan of care, and agree.  I have made changes in the body of the note where appropriate.
prognisis guarded
HCP having doubts about comfort care
continue comfort care
family wants comfort
now on comfort care at family request

## 2018-10-15 NOTE — PROGRESS NOTE ADULT - SUBJECTIVE AND OBJECTIVE BOX
Date/Time Patient Seen:  		  Referring MD:   Data Reviewed	       Patient is a 73y old  Male who presents with a chief complaint of not feeling well (14 Oct 2018 19:23)    in bed  seen and examined  vs and meds reviewed    Subjective/HPI     PAST MEDICAL & SURGICAL HISTORY:  Myocardial infarct, old  Gastroesophageal reflux disease, esophagitis presence not specified  Other pulmonary embolism with acute cor pulmonale, unspecified chronicity  Acute congestive heart failure, unspecified heart failure type  Cellulitis, unspecified cellulitis site: abdomen buttocks  Candida infection  Acute congestive heart failure, unspecified heart failure type  Rheumatoid arthritis with positive rheumatoid factor, involving unspecified site  Primary osteoarthritis, unspecified site  Chronic atrial fibrillation  Bipolar 1 disorder  Angina at rest  COPD mixed type  Essential hypertension  Morbid obesity        Medication list         MEDICATIONS  (STANDING):  ALBUTerol    0.083% 2.5 milliGRAM(s) Nebulizer every 6 hours  cyanocobalamin Injectable 1000 MICROGram(s) IntraMuscular daily  fentaNYL   Patch  25 MICROgram(s)/Hr 1 Patch Transdermal every 72 hours  folic acid 1 milliGRAM(s) Oral daily  lidocaine   Patch 1 Patch Transdermal daily  metoclopramide Injectable 5 milliGRAM(s) IV Push every 8 hours  morphine  Infusion 2 mG/Hr (2 mL/Hr) IV Continuous <Continuous>  multivitamin 1 Tablet(s) Oral daily  nystatin Powder 1 Application(s) Topical two times a day  QUEtiapine 100 milliGRAM(s) Oral daily  QUEtiapine 100 milliGRAM(s) Oral at bedtime  venlafaxine XR. 150 milliGRAM(s) Oral daily    MEDICATIONS  (PRN):  aluminum hydroxide/magnesium hydroxide/simethicone Suspension 30 milliLiter(s) Oral every 6 hours PRN Dyspepsia  artificial  tears Solution 1 Drop(s) Both EYES four times a day PRN Dry Eyes  atropine 1% Ophthalmic Solution for SubLingual Use 2 Drop(s) SubLingual four times a day PRN oral secretions  bisacodyl 5 milliGRAM(s) Oral every 12 hours PRN Constipation  bisacodyl Suppository 10 milliGRAM(s) Rectal daily PRN Constipation  HYDROmorphone  Injectable 1 milliGRAM(s) IV Push every 90 minutes PRN distress, pain, dyspnea,  HYDROmorphone  Injectable 1 milliGRAM(s) IV Push three times a day PRN Mild Pain (1 - 3)  LORazepam   Injectable 1 milliGRAM(s) IV Push every 2 hours PRN dyspnea, distress, anxiety  magnesium hydroxide Suspension 30 milliLiter(s) Oral daily PRN Constipation  morphine  - Injectable 2 milliGRAM(s) IV Push every 4 hours PRN breakthrough pain  ondansetron Injectable 4 milliGRAM(s) IV Push every 4 hours PRN Nausea  polyethylene glycol 3350 17 Gram(s) Oral daily PRN Constipation  promethazine 25 milliGRAM(s) Oral four times a day PRN breakthrough nausea / vomiting         Vitals log        ICU Vital Signs Last 24 Hrs  T(C): --  T(F): --  HR: 96 (15 Oct 2018 00:59) (78 - 101)  BP: --  BP(mean): --  ABP: --  ABP(mean): --  RR: --  SpO2: 96% (15 Oct 2018 00:59) (92% - 96%)           Input and Output:  I&O's Detail    14 Oct 2018 07:01  -  15 Oct 2018 07:00  --------------------------------------------------------  IN:    morphine  Infusion: 24 mL  Total IN: 24 mL    OUT:    Indwelling Catheter - Urethral: 500 mL  Total OUT: 500 mL    Total NET: -476 mL          Lab Data                  Review of Systems	      Objective     Physical Examination    heart s1s2  lung dec BS  abd soft  obese      Pertinent Lab findings & Imaging      Grande:  NO   Adequate UO     I&O's Detail    14 Oct 2018 07:01  -  15 Oct 2018 07:00  --------------------------------------------------------  IN:    morphine  Infusion: 24 mL  Total IN: 24 mL    OUT:    Indwelling Catheter - Urethral: 500 mL  Total OUT: 500 mL    Total NET: -476 mL               Discussed with:     Cultures:	        Radiology

## 2018-10-16 NOTE — PROGRESS NOTE ADULT - SUBJECTIVE AND OBJECTIVE BOX
Patient is a 73y old  Male who presents with a chief complaint of not feeling well (16 Oct 2018 08:32)      INTERVAL /OVERNIGHT EVENTS: less responsive, + gurgling    MEDICATIONS  (STANDING):  ALBUTerol    0.083% 2.5 milliGRAM(s) Nebulizer every 6 hours  artificial  tears Solution 1 Drop(s) Both EYES four times a day  atropine 1% Ophthalmic Solution for SubLingual Use 2 Drop(s) SubLingual four times a day  fentaNYL   Patch  25 MICROgram(s)/Hr 1 Patch Transdermal every 72 hours  lidocaine   Patch 1 Patch Transdermal daily  metoclopramide Injectable 5 milliGRAM(s) IV Push every 8 hours  morphine  Infusion 2 mG/Hr (2 mL/Hr) IV Continuous <Continuous>  nystatin Powder 1 Application(s) Topical two times a day  QUEtiapine 100 milliGRAM(s) Oral daily    MEDICATIONS  (PRN):  aluminum hydroxide/magnesium hydroxide/simethicone Suspension 30 milliLiter(s) Oral every 6 hours PRN Dyspepsia  bisacodyl 5 milliGRAM(s) Oral every 12 hours PRN Constipation  bisacodyl Suppository 10 milliGRAM(s) Rectal daily PRN Constipation  HYDROmorphone  Injectable 1 milliGRAM(s) IV Push every 90 minutes PRN distress, pain, dyspnea,  HYDROmorphone  Injectable 1 milliGRAM(s) IV Push three times a day PRN Mild Pain (1 - 3)  LORazepam   Injectable 1 milliGRAM(s) IV Push every 2 hours PRN dyspnea, distress, anxiety  magnesium hydroxide Suspension 30 milliLiter(s) Oral daily PRN Constipation  morphine  - Injectable 2 milliGRAM(s) IV Push every 4 hours PRN breakthrough pain  ondansetron Injectable 4 milliGRAM(s) IV Push every 4 hours PRN Nausea  polyethylene glycol 3350 17 Gram(s) Oral daily PRN Constipation  promethazine 25 milliGRAM(s) Oral four times a day PRN breakthrough nausea / vomiting      Allergies    penicillin (Unknown)  strawberry (Unknown)    Intolerances        REVIEW OF SYSTEMS: unable to obtain    Vital Signs Last 24 Hrs  T(C): --  T(F): --  HR: 102 (16 Oct 2018 19:41) (102 - 116)  BP: --  BP(mean): --  RR: --  SpO2: 92% (16 Oct 2018 19:41) (92% - 95%)    PHYSICAL EXAM:  GENERAL:  NAD  HEAD:  Atraumatic, Normocephalic  EYES: EOMI, PERRLA, conjunctiva and sclera clear  ENMT: No tonsillar erythema, exudates, or enlargement; Moist mucous membranes, Good dentition, No lesions  NECK: Supple, No JVD, Normal thyroid  NERVOUS SYSTEM: unable to perform  CHEST/LUNG: Clear to auscultation bilaterally; No rales, rhonchi, wheezing, or rubs  HEART: Regular rate and rhythm; No murmurs, rubs, or gallops  ABDOMEN: Soft, Nontender, Nondistended; Bowel sounds present  EXTREMITIES:  2+ Peripheral Pulses, No clubbing, cyanosis, or edema  LYMPH: No lymphadenopathy noted  SKIN: round / concentric lesions    LABS:              CAPILLARY BLOOD GLUCOSE          RADIOLOGY & ADDITIONAL TESTS:    Notes Reviewed:  [x ] YES  [ ] NO    Care Discussed with Consultants/Other Providers [x ] YES  [ ] NO

## 2018-10-16 NOTE — PROGRESS NOTE ADULT - PROBLEM SELECTOR PLAN 1
? etiology, now on comfort care  GI eval with Dr. VALLEJO appreciated  no acute abdomen, no further GI work up

## 2018-10-16 NOTE — PROGRESS NOTE ADULT - SUBJECTIVE AND OBJECTIVE BOX
Date/Time Patient Seen:  		  Referring MD:   Data Reviewed	       Patient is a 73y old  Male who presents with a chief complaint of not feeling well (15 Oct 2018 13:39)  in bed  seen and examined      Subjective/HPI     PAST MEDICAL & SURGICAL HISTORY:  Myocardial infarct, old  Gastroesophageal reflux disease, esophagitis presence not specified  Other pulmonary embolism with acute cor pulmonale, unspecified chronicity  Acute congestive heart failure, unspecified heart failure type  Cellulitis, unspecified cellulitis site: abdomen buttocks  Candida infection  Acute congestive heart failure, unspecified heart failure type  Rheumatoid arthritis with positive rheumatoid factor, involving unspecified site  Primary osteoarthritis, unspecified site  Chronic atrial fibrillation  Bipolar 1 disorder  Angina at rest  COPD mixed type  Essential hypertension  Morbid obesity        Medication list         MEDICATIONS  (STANDING):  ALBUTerol    0.083% 2.5 milliGRAM(s) Nebulizer every 6 hours  artificial  tears Solution 1 Drop(s) Both EYES four times a day  atropine 1% Ophthalmic Solution for SubLingual Use 2 Drop(s) SubLingual four times a day  fentaNYL   Patch  25 MICROgram(s)/Hr 1 Patch Transdermal every 72 hours  lidocaine   Patch 1 Patch Transdermal daily  metoclopramide Injectable 5 milliGRAM(s) IV Push every 8 hours  morphine  Infusion 2 mG/Hr (2 mL/Hr) IV Continuous <Continuous>  nystatin Powder 1 Application(s) Topical two times a day  QUEtiapine 100 milliGRAM(s) Oral daily  QUEtiapine 100 milliGRAM(s) Oral at bedtime    MEDICATIONS  (PRN):  aluminum hydroxide/magnesium hydroxide/simethicone Suspension 30 milliLiter(s) Oral every 6 hours PRN Dyspepsia  bisacodyl 5 milliGRAM(s) Oral every 12 hours PRN Constipation  bisacodyl Suppository 10 milliGRAM(s) Rectal daily PRN Constipation  HYDROmorphone  Injectable 1 milliGRAM(s) IV Push every 90 minutes PRN distress, pain, dyspnea,  HYDROmorphone  Injectable 1 milliGRAM(s) IV Push three times a day PRN Mild Pain (1 - 3)  LORazepam   Injectable 1 milliGRAM(s) IV Push every 2 hours PRN dyspnea, distress, anxiety  magnesium hydroxide Suspension 30 milliLiter(s) Oral daily PRN Constipation  morphine  - Injectable 2 milliGRAM(s) IV Push every 4 hours PRN breakthrough pain  ondansetron Injectable 4 milliGRAM(s) IV Push every 4 hours PRN Nausea  polyethylene glycol 3350 17 Gram(s) Oral daily PRN Constipation  promethazine 25 milliGRAM(s) Oral four times a day PRN breakthrough nausea / vomiting         Vitals log        ICU Vital Signs Last 24 Hrs  T(C): --  T(F): --  HR: 100 (15 Oct 2018 19:43) (93 - 104)  BP: --  BP(mean): --  ABP: --  ABP(mean): --  RR: --  SpO2: 95% (15 Oct 2018 19:43) (95% - 96%)           Input and Output:  I&O's Detail    14 Oct 2018 07:01  -  15 Oct 2018 07:00  --------------------------------------------------------  IN:    morphine  Infusion: 24 mL  Total IN: 24 mL    OUT:    Indwelling Catheter - Urethral: 500 mL  Total OUT: 500 mL    Total NET: -476 mL      15 Oct 2018 07:01  -  16 Oct 2018 06:53  --------------------------------------------------------  IN:    morphine  Infusion: 24 mL  Total IN: 24 mL    OUT:    Indwelling Catheter - Urethral: 400 mL  Total OUT: 400 mL    Total NET: -376 mL          Lab Data                  Review of Systems	      Objective     Physical Examination    obese  head at  heart s1s2  oral secretions      Pertinent Lab findings & Imaging      Grande:  NO   Adequate UO     I&O's Detail    14 Oct 2018 07:01  -  15 Oct 2018 07:00  --------------------------------------------------------  IN:    morphine  Infusion: 24 mL  Total IN: 24 mL    OUT:    Indwelling Catheter - Urethral: 500 mL  Total OUT: 500 mL    Total NET: -476 mL      15 Oct 2018 07:01  -  16 Oct 2018 06:53  --------------------------------------------------------  IN:    morphine  Infusion: 24 mL  Total IN: 24 mL    OUT:    Indwelling Catheter - Urethral: 400 mL  Total OUT: 400 mL    Total NET: -376 mL               Discussed with:     Cultures:	        Radiology

## 2018-10-16 NOTE — CHART NOTE - NSCHARTNOTEFT_GEN_A_CORE
Assessment: patient seen sleeping breakfast tray at bedside. comfort care at this time.    Factors impacting intake: [ ] none [ ] nausea  [ ] vomiting [ ] diarrhea [ ] constipation  [ ]chewing problems [ ] swallowing issues  [x ] other: comfort care hospice consult    Diet Presciption: Diet, Pureed:   Supplement Feeding Modality:  Oral  Health Shake Cans or Servings Per Day:  1       Frequency:  Three Times a day (10-11-18 @ 11:49)    Intake: poor PO noted.       Pertinent Medications: MEDICATIONS  (STANDING):  ALBUTerol    0.083% 2.5 milliGRAM(s) Nebulizer every 6 hours  artificial  tears Solution 1 Drop(s) Both EYES four times a day  atropine 1% Ophthalmic Solution for SubLingual Use 2 Drop(s) SubLingual four times a day  fentaNYL   Patch  25 MICROgram(s)/Hr 1 Patch Transdermal every 72 hours  lidocaine   Patch 1 Patch Transdermal daily  metoclopramide Injectable 5 milliGRAM(s) IV Push every 8 hours  morphine  Infusion 2 mG/Hr (2 mL/Hr) IV Continuous <Continuous>  nystatin Powder 1 Application(s) Topical two times a day  QUEtiapine 100 milliGRAM(s) Oral daily    MEDICATIONS  (PRN):  aluminum hydroxide/magnesium hydroxide/simethicone Suspension 30 milliLiter(s) Oral every 6 hours PRN Dyspepsia  bisacodyl 5 milliGRAM(s) Oral every 12 hours PRN Constipation  bisacodyl Suppository 10 milliGRAM(s) Rectal daily PRN Constipation  HYDROmorphone  Injectable 1 milliGRAM(s) IV Push every 90 minutes PRN distress, pain, dyspnea,  HYDROmorphone  Injectable 1 milliGRAM(s) IV Push three times a day PRN Mild Pain (1 - 3)  LORazepam   Injectable 1 milliGRAM(s) IV Push every 2 hours PRN dyspnea, distress, anxiety  magnesium hydroxide Suspension 30 milliLiter(s) Oral daily PRN Constipation  morphine  - Injectable 2 milliGRAM(s) IV Push every 4 hours PRN breakthrough pain  ondansetron Injectable 4 milliGRAM(s) IV Push every 4 hours PRN Nausea  polyethylene glycol 3350 17 Gram(s) Oral daily PRN Constipation  promethazine 25 milliGRAM(s) Oral four times a day PRN breakthrough nausea / vomiting    Pertinent Labs:  10-10 Phos 1.9 mg/dL<L> 10-10 Alb 2.3 g/dL<L>          Skin: kathie 8 + generalized 1 edema multiple DTI    Estimated Needs:   [x ] no change since previous assessment  [ ] recalculated:     Previous Nutrition Diagnosis:    [x ] Overweight/Obesity        Nutrition Diagnosis is [x ] ongoing  [ ] resolved [ ] not applicable     New Nutrition Diagnosis: [ ] not applicable       Interventions:   Recommend  [ ] Change Diet To:  [ ] Nutrition Supplement  [ ] Nutrition Support  [x ] Other: continue diet as ordered with health shakes    Monitoring and Evaluation:   [ x] PO intake [ x ] Tolerance to diet prescription [ x ] weights [ x ] labs[ x ] follow up per protocol  [ ] other:

## 2018-10-16 NOTE — PROGRESS NOTE ADULT - SUBJECTIVE AND OBJECTIVE BOX
INTERVAL HPI/OVERNIGHT EVENTS:  resting in bed  on comfort measures    MEDICATIONS  (STANDING):  ALBUTerol    0.083% 2.5 milliGRAM(s) Nebulizer every 6 hours  artificial  tears Solution 1 Drop(s) Both EYES four times a day  atropine 1% Ophthalmic Solution for SubLingual Use 2 Drop(s) SubLingual four times a day  fentaNYL   Patch  25 MICROgram(s)/Hr 1 Patch Transdermal every 72 hours  lidocaine   Patch 1 Patch Transdermal daily  metoclopramide Injectable 5 milliGRAM(s) IV Push every 8 hours  morphine  Infusion 2 mG/Hr (2 mL/Hr) IV Continuous <Continuous>  nystatin Powder 1 Application(s) Topical two times a day  QUEtiapine 100 milliGRAM(s) Oral daily  QUEtiapine 100 milliGRAM(s) Oral at bedtime    MEDICATIONS  (PRN):  aluminum hydroxide/magnesium hydroxide/simethicone Suspension 30 milliLiter(s) Oral every 6 hours PRN Dyspepsia  bisacodyl 5 milliGRAM(s) Oral every 12 hours PRN Constipation  bisacodyl Suppository 10 milliGRAM(s) Rectal daily PRN Constipation  HYDROmorphone  Injectable 1 milliGRAM(s) IV Push every 90 minutes PRN distress, pain, dyspnea,  HYDROmorphone  Injectable 1 milliGRAM(s) IV Push three times a day PRN Mild Pain (1 - 3)  LORazepam   Injectable 1 milliGRAM(s) IV Push every 2 hours PRN dyspnea, distress, anxiety  magnesium hydroxide Suspension 30 milliLiter(s) Oral daily PRN Constipation  morphine  - Injectable 2 milliGRAM(s) IV Push every 4 hours PRN breakthrough pain  ondansetron Injectable 4 milliGRAM(s) IV Push every 4 hours PRN Nausea  polyethylene glycol 3350 17 Gram(s) Oral daily PRN Constipation  promethazine 25 milliGRAM(s) Oral four times a day PRN breakthrough nausea / vomiting      Allergies    penicillin (Unknown)  strawberry (Unknown)    Intolerances        Review of Systems:    unable to obtain    Vital Signs Last 24 Hrs  T(C): --  T(F): --  HR: 110 (16 Oct 2018 08:05) (100 - 116)  BP: --  BP(mean): --  RR: --  SpO2: 93% (16 Oct 2018 08:05) (93% - 96%)    PHYSICAL EXAM:  in nad, lying in bed  ncat  obese abd soft   lethargic but arousable    LABS:                RADIOLOGY & ADDITIONAL TESTS:

## 2018-10-16 NOTE — PROGRESS NOTE ADULT - PROBLEM SELECTOR PLAN 1
multiple medical issues  heart disease  hx of PE  obesity  intractable pain  oral hygiene  skin care  assist with ADL  on Opioids, Hospice eval pending  sister is aware of poor prognosis  pt is DNR DNI  will follow  bowel regimen for patient on Opioids  Atropine SL, gentle suction

## 2018-10-16 NOTE — PROGRESS NOTE ADULT - PROBLEM SELECTOR PLAN 1
pt on comfort measures  palliative care following  pain control  bowel regimen   hospice eval pending

## 2018-10-17 NOTE — PROGRESS NOTE ADULT - SUBJECTIVE AND OBJECTIVE BOX
INTERVAL HPI/OVERNIGHT EVENTS:  pt resting in bed  on comfort measures    MEDICATIONS  (STANDING):  ALBUTerol    0.083% 2.5 milliGRAM(s) Nebulizer every 6 hours  artificial  tears Solution 1 Drop(s) Both EYES four times a day  atropine 1% Ophthalmic Solution for SubLingual Use 2 Drop(s) SubLingual four times a day  fentaNYL   Patch  25 MICROgram(s)/Hr 1 Patch Transdermal every 72 hours  lidocaine   Patch 1 Patch Transdermal daily  metoclopramide Injectable 5 milliGRAM(s) IV Push every 8 hours  morphine  Infusion 2 mG/Hr (2 mL/Hr) IV Continuous <Continuous>  nystatin Powder 1 Application(s) Topical two times a day  QUEtiapine 100 milliGRAM(s) Oral daily    MEDICATIONS  (PRN):  aluminum hydroxide/magnesium hydroxide/simethicone Suspension 30 milliLiter(s) Oral every 6 hours PRN Dyspepsia  bisacodyl 5 milliGRAM(s) Oral every 12 hours PRN Constipation  bisacodyl Suppository 10 milliGRAM(s) Rectal daily PRN Constipation  HYDROmorphone  Injectable 1 milliGRAM(s) IV Push every 90 minutes PRN distress, pain, dyspnea,  HYDROmorphone  Injectable 1 milliGRAM(s) IV Push three times a day PRN Mild Pain (1 - 3)  LORazepam   Injectable 1 milliGRAM(s) IV Push every 2 hours PRN dyspnea, distress, anxiety  magnesium hydroxide Suspension 30 milliLiter(s) Oral daily PRN Constipation  morphine  - Injectable 2 milliGRAM(s) IV Push every 4 hours PRN breakthrough pain  ondansetron Injectable 4 milliGRAM(s) IV Push every 4 hours PRN Nausea  polyethylene glycol 3350 17 Gram(s) Oral daily PRN Constipation  promethazine 25 milliGRAM(s) Oral four times a day PRN breakthrough nausea / vomiting      Allergies    penicillin (Unknown)  strawberry (Unknown)    Intolerances        Review of Systems:    unable to obtain      Vital Signs Last 24 Hrs  T(C): --  T(F): --  HR: 118 (17 Oct 2018 00:36) (102 - 119)  BP: --  BP(mean): --  RR: --  SpO2: 90% (17 Oct 2018 00:36) (90% - 95%)    PHYSICAL EXAM:    in nad, lying in bed  ncat  lethargic     LABS:                RADIOLOGY & ADDITIONAL TESTS:

## 2018-10-17 NOTE — PROGRESS NOTE ADULT - PROBLEM SELECTOR PLAN 4
? aspiration, ruled out  off ABX now, on palliative care  ID eval with Dr. BAINS appreciated
? aspiration  IV ABX  ID eval
? aspiration  IV ABX  ID eval with Dr. BAINS
? aspiration  IV ABX  ID eval with Dr. BAINS
? aspiration  IV ABX  ID eval with Dr. BAINS appreciated
? aspiration, ruled out  off ABX now  ID eval with Dr. BAINS appreciated
? aspiration, ruled out  off ABX now, on palliative care  ID eval with Dr. BAINS appreciated
? aspiration, ruled out  off ABX now, on palliative care  ID eval with Dr. BAINS appreciated
? aspiration, ruled out, continue comfort care  off ABX now, on palliative care  ID eval with Dr. BAINS appreciated
Advanced care planning was discussed with patient and family.  Advanced care planning forms were reviewed and discussed.  Risks, benefits and alternatives of gastroenterologic procedures were discussed in detail and all questions were answered.    30 minutes spent.
see above
multifactorial  ammonia improved sp lactulose  neuro following  speech eval pending  rest of care per primary team
multifactorial  ammonia improved sp lactulose  recheck level in am  neuro following  rest of care per primary team      above plan of care dw dr liz, agreeable

## 2018-10-17 NOTE — PROGRESS NOTE ADULT - PROBLEM SELECTOR PLAN 5
? etiology  neuro eval with Dr. FINE appreciated  no further work up
? , etiology, now on comfort care  neuro eval with Dr. FINE appreciated  no further work up
? , etiology, now on comfort care  neuro eval with Dr. FINE appreciated  no further work up
? etiology  neuro eval
? etiology  neuro eval
? etiology  neuro eval with Dr. FINE
? etiology  neuro eval with Dr. FINE
? etiology  neuro eval with Dr. FINE appreciated
? etiology  neuro eval with Dr. FINE appreciated  no further work up

## 2018-10-17 NOTE — PROGRESS NOTE ADULT - SUBJECTIVE AND OBJECTIVE BOX
Patient is a 73y old  Male who presents with a chief complaint of not feeling well (17 Oct 2018 09:15)      INTERVAL /OVERNIGHT EVENTS: gasping for air    MEDICATIONS  (STANDING):  artificial  tears Solution 1 Drop(s) Both EYES four times a day  atropine 1% Ophthalmic Solution for SubLingual Use 2 Drop(s) SubLingual four times a day  fentaNYL   Patch  25 MICROgram(s)/Hr. 1 Patch Transdermal every 48 hours  morphine  Infusion 2 mG/Hr (2 mL/Hr) IV Continuous <Continuous>  nystatin Powder 1 Application(s) Topical two times a day    MEDICATIONS  (PRN):  bisacodyl Suppository 10 milliGRAM(s) Rectal daily PRN Constipation  glycerin Suppository - Adult 1 Suppository(s) Rectal daily PRN Constipation  HYDROmorphone  Injectable 1 milliGRAM(s) IV Push five times a day PRN Mild Pain (1 - 3)  HYDROmorphone  Injectable 1 milliGRAM(s) IV Push every 1 hour PRN distress, pain, dyspnea,  LORazepam   Injectable 1 milliGRAM(s) IV Push every 2 hours PRN dyspnea, distress, anxiety  mineral oil enema 133 milliLiter(s) Rectal daily PRN constipation  morphine  - Injectable 2 milliGRAM(s) IV Push every 2 hours PRN breakthrough pain  ondansetron Injectable 4 milliGRAM(s) IV Push every 4 hours PRN Nausea      Allergies    penicillin (Unknown)  strawberry (Unknown)    Intolerances        REVIEW OF SYSTEMS: unable to obtain    Vital Signs Last 24 Hrs  T(C): --  T(F): --  HR: 118 (17 Oct 2018 00:36) (102 - 119)  BP: --  BP(mean): --  RR: --  SpO2: 90% (17 Oct 2018 00:36) (90% - 92%)    PHYSICAL EXAM:  GENERAL: NAD, well-groomed, well-developed  HEAD:  Atraumatic, Normocephalic  EYES: EOMI, PERRLA, conjunctiva and sclera clear  ENMT: No tonsillar erythema, exudates, or enlargement; Moist mucous membranes, Good dentition, No lesions  NECK: Supple, No JVD, Normal thyroid  NERVOUS SYSTEM: unable to perform  CHEST/LUNG: Clear to auscultation bilaterally; No rales, rhonchi, wheezing, or rubs  HEART: Regular rate and rhythm; No murmurs, rubs, or gallops  ABDOMEN: Soft, Nontender, Nondistended; Bowel sounds present  EXTREMITIES:  2+ Peripheral Pulses, No clubbing, cyanosis, or edema  LYMPH: No lymphadenopathy noted  SKIN: No rashes or lesions    LABS:              CAPILLARY BLOOD GLUCOSE          RADIOLOGY & ADDITIONAL TESTS:    Notes Reviewed:  [x ] YES  [ ] NO    Care Discussed with Consultants/Other Providers [x ] YES  [ ] NO

## 2018-10-17 NOTE — PROGRESS NOTE ADULT - REASON FOR ADMISSION
not feeling well
not feeling well; heme asked to evaluate patient for anemia
not feeling well

## 2018-10-17 NOTE — PROGRESS NOTE ADULT - PROBLEM SELECTOR PLAN 6
? etiology, improved, on comfort care  multifactorial, no capacity  psych eval with Dr. mike appreciated

## 2018-10-17 NOTE — PROGRESS NOTE ADULT - PROBLEM SELECTOR PLAN 1
pt on comfort measures  palliative care following  pain control  bowel regimen   will sign off at this time, please reconsult if needed

## 2018-10-17 NOTE — PROGRESS NOTE ADULT - PROBLEM SELECTOR PROBLEM 4
PNA (pneumonia)
ACP (advance care planning)
Encephalopathy, unspecified
PNA (pneumonia)
Encephalopathy, unspecified
Encephalopathy, unspecified

## 2018-10-17 NOTE — PROGRESS NOTE ADULT - PROBLEM SELECTOR PROBLEM 1
Abdominal pain
ACP (advance care planning)
Abdominal pain
Functional diarrhea
Multi-organ failure with heart failure
Palliative care encounter
Multi-organ failure with heart failure
Functional diarrhea

## 2018-10-17 NOTE — CHART NOTE - NSCHARTNOTEFT_GEN_A_CORE
Do you have Advance Directives (HCP / LV / Organ donation / Documentation of oral advance Directive):   (  x  )  yes    (      )    NO                                                                            Do you have LV - Living will :                                                                                                                                             (    )  yes    (    x  )   No    Do you have HCP - Health Care Proxy:                                                                                                                            (   x  )  yes   (       ) N0    Do you have DNR- Do Not Resuscitate :                                                                                                                           (   x   )  yes  (        )  No    Do you have DNI- Do Not intubate  :                                                                                                                               (   x   )  yes   (       ) No    Do you have MOLST - Medical orders for Life sustaining treatment  :                                                                    (   x   ) yes    (       ) No    Decision Maker :  (     ) Patient     ( x     )  HCA   (     ) Public Health Law Surrogate     (      ) Surrogate  (       ) Guardian    Goals of Care :  (      )   Complete Care     (       ) No Limitations                              (   x    )   Comfort Care       (       )  Hospice                               (  x    )   Limited medical Intervention / s    Medical Interventions :   (        )   CPR       (  x      )  DNR                                               (        )  Intubation with MV - Mechanical Ventilation  (      ) BIPAP/CPAP    (    x     )   DNI                                               (         )  Artificial Nutrition -  IVF, TPN / PPN, Tube Feeds             (    x     )   No Feeding Tube                                                (   x     ) Use Antibiotics                         (          ) No Antibiotics                                                (    x     ) Blood and Blood Products     (         )   No Blood or Blood products                                                (          )  Dialysis                                    (   x      )  No Dialysis                                                (          )  Medical Management only  (     x    )  No Invasive Interventions or Surgery  Time spent :                        (  x     ) up to 30 minutes                       (           )   more than 30 minutes  Goals of care reviewed and discussed with pt and family discussed on multiple occasions

## 2018-10-17 NOTE — PROGRESS NOTE ADULT - PROBLEM SELECTOR PROBLEM 5
Encephalopathy, unspecified

## 2018-10-17 NOTE — PROGRESS NOTE ADULT - PROVIDER SPECIALTY LIST ADULT
Cardiology
Family Medicine
Gastroenterology
Heme/Onc
Infectious Disease
Nephrology
Neurology
Palliative Care
Psychiatry
Psychiatry
Pulmonology
Nephrology
Pulmonology
Cardiology
Infectious Disease
Neurology
Family Medicine

## 2018-10-17 NOTE — PROGRESS NOTE ADULT - PROBLEM SELECTOR PROBLEM 2
Diarrhea
Anemia
Diarrhea
Other iron deficiency anemia
Anemia

## 2018-10-17 NOTE — PROGRESS NOTE ADULT - SUBJECTIVE AND OBJECTIVE BOX
Date/Time Patient Seen:  		  Referring MD:   Data Reviewed	       Patient is a 73y old  Male who presents with a chief complaint of not feeling well (17 Oct 2018 08:43)  in bed  on opioids  family at the bedside        Subjective/HPI     PAST MEDICAL & SURGICAL HISTORY:  Myocardial infarct, old  Gastroesophageal reflux disease, esophagitis presence not specified  Other pulmonary embolism with acute cor pulmonale, unspecified chronicity  Acute congestive heart failure, unspecified heart failure type  Cellulitis, unspecified cellulitis site: abdomen buttocks  Candida infection  Acute congestive heart failure, unspecified heart failure type  Rheumatoid arthritis with positive rheumatoid factor, involving unspecified site  Primary osteoarthritis, unspecified site  Chronic atrial fibrillation  Bipolar 1 disorder  Angina at rest  COPD mixed type  Essential hypertension  Morbid obesity        Medication list         MEDICATIONS  (STANDING):  ALBUTerol    0.083% 2.5 milliGRAM(s) Nebulizer every 6 hours  artificial  tears Solution 1 Drop(s) Both EYES four times a day  atropine 1% Ophthalmic Solution for SubLingual Use 2 Drop(s) SubLingual four times a day  fentaNYL   Patch  25 MICROgram(s)/Hr 1 Patch Transdermal every 72 hours  lidocaine   Patch 1 Patch Transdermal daily  metoclopramide Injectable 5 milliGRAM(s) IV Push every 8 hours  morphine  Infusion 2 mG/Hr (2 mL/Hr) IV Continuous <Continuous>  nystatin Powder 1 Application(s) Topical two times a day    MEDICATIONS  (PRN):  bisacodyl Suppository 10 milliGRAM(s) Rectal daily PRN Constipation  glycerin Suppository - Adult 1 Suppository(s) Rectal daily PRN Constipation  HYDROmorphone  Injectable 1 milliGRAM(s) IV Push every 90 minutes PRN distress, pain, dyspnea,  HYDROmorphone  Injectable 1 milliGRAM(s) IV Push three times a day PRN Mild Pain (1 - 3)  LORazepam   Injectable 1 milliGRAM(s) IV Push every 2 hours PRN dyspnea, distress, anxiety  mineral oil enema 133 milliLiter(s) Rectal daily PRN constipation  morphine  - Injectable 2 milliGRAM(s) IV Push every 4 hours PRN breakthrough pain  ondansetron Injectable 4 milliGRAM(s) IV Push every 4 hours PRN Nausea         Vitals log        ICU Vital Signs Last 24 Hrs  T(C): --  T(F): --  HR: 118 (17 Oct 2018 00:36) (102 - 119)  BP: --  BP(mean): --  ABP: --  ABP(mean): --  RR: --  SpO2: 90% (17 Oct 2018 00:36) (90% - 95%)           Input and Output:  I&O's Detail    16 Oct 2018 07:01  -  17 Oct 2018 07:00  --------------------------------------------------------  IN:  Total IN: 0 mL    OUT:    Indwelling Catheter - Urethral: 200 mL  Total OUT: 200 mL    Total NET: -200 mL          Lab Data                  Review of Systems	      Objective     Physical Examination  heart s1s2  lung dec BS        Pertinent Lab findings & Imaging      Harjinder:  FAITH   Adequate UO     I&O's Detail    16 Oct 2018 07:01  -  17 Oct 2018 07:00  --------------------------------------------------------  IN:  Total IN: 0 mL    OUT:    Indwelling Catheter - Urethral: 200 mL  Total OUT: 200 mL    Total NET: -200 mL               Discussed with:     Cultures:	        Radiology

## 2018-10-17 NOTE — CHART NOTE - NSCHARTNOTEFT_GEN_A_CORE
Called by RN that patient had .     On physical exam. Patient did not respond to verbal or physical stimuli. Absent heart and breath sounds. Absent peripheral pulses. Pupils are fixed and dilated.    Patient pronounced death at 19:58. Dr Tuttle notified.  Family at bedside.  Autopsy declined.    Dr. Cota Called by RN that patient had .     On physical exam. Patient did not respond to verbal or physical stimuli. Absent heart and breath sounds. Absent peripheral pulses. Pupils are fixed and dilated.    Patient pronounced dead at 19:53. Dr Tuttle notified.  Family at bedside.  Autopsy declined.    Dr. Cota

## 2018-10-17 NOTE — PROGRESS NOTE ADULT - PROBLEM SELECTOR PLAN 1
end of life care  opioids  bowel regimen  benzo PRN  oral hygiene  skin care  assist with ADL  monitor for distress  discussed prognosis with sister at the bedside  pt is DNR DNI  will follow and monitor

## 2018-12-31 NOTE — INPATIENT CERTIFICATION FOR MEDICARE PATIENTS - NS ICMP CERT SIG IND
Spoke with patients son  He declines to participate in [facility] program  He handles his dads care and if has any issues will call pcp office for assistance 
I certify as stated above.

## 2019-02-26 NOTE — ED PROVIDER NOTE - CROS ED ROS STATEMENT
Constitutional: (-) fever  (-)chills  (-)sweats  Eyes/ENT: (-) blurry vision, (-) epistaxis  (-)rhinorrhea   (-) sore throat    Cardiovascular: (+) chest pain, (+) palpitations (-) edema   Respiratory: (-) cough, (-) shortness of breath   Gastrointestinal: (-)nausea  (-)vomiting, (-) diarrhea  (-) abdominal pain   :  (-)dysuria, (-)frequency, (-)urgency, (-)hematuria  Musculoskeletal: (-) neck pain, (-) back pain, (-) joint pain  Integumentary: (-) rash, (-) edema  Neurological: (-) headache, (-) altered mental status  (-)LOC
all other ROS negative except as per HPI

## 2019-05-07 NOTE — PROGRESS NOTE ADULT - ASSESSMENT
Tried to reach patient, no answer. [facility] with brief info re: meds being sent in and referral being sent over. Asked that she call office back to confirm message received and to clarify where she wanted to go for massage therapy since there are several massage therapists in the [facility] area. No specific [place] other than [facility] which lists that they do have Massage Therapy.  However, there is Chiropractor that offers massage, independent massage therapists, etc. Mr. Mills is a 73 year old male with HTN, and documented MI and congestive heart failure, here with altered mental status.  Unclear etiology of symptoms though Likely all metabolic in origin. He has been starving himself for the last 2 weeks and has had significant diarrhea  Labs notable for elevated BNP, with some mild overload on exam    Tachycardia  - No sign of acute ischemia. EKG is sinus tachycardia with APCs.   - Continue Tele ST with Sinus arrythmia  110-115 with PVCs  - Consider restarting his BB when BP more stable    DVT/PE  - Cont coumadin maintain goal INR 2-3.  INR today 2.28    Mild Volume Overload  - Check echocardiogram to define LV function  - Hold off on diuresis at this time  - Received 4L IVF in 24 hours 10/5-10/6.  Lactate normalized. Watch volume status closely   - Watch creatinine and electrolytes. Keep K>4, Mg>2    DIOR   - Cr labile may have CKD  - Avoid nephrotoxic meds   - Renal following    PNA  - Abdominal CT with possible LL PNA.  - CXR poor study bronchovesicular markings in perihialr regions could be r/t vascular congestion  - Pulm following  - Abx as per ID for poss asp PNA    Anemia  - Drop in Hb noted. FOBT +.   - s/p PRBC 1 unit 10/6 H/H 7.5/23.8->8.3/26.3  - Would transfuse to keep Hb>8  - Hold anti-HTN medications    - Will follow with you.    Elizabeth Hathaway, NP-C  Cardiology

## 2019-12-30 NOTE — CONSULT NOTE ADULT - PROBLEM SELECTOR RECOMMENDATION 2
daily cbc   transfuse prn   consider hematology eval  check iron studies   ppi once a day  check stool occult blood  further recommendations pending above
HF hx  AF  eval CHF  ct reviewed  proBNP  may need TTE  pulse ox and card tele monitoring
16

## 2020-09-03 NOTE — DOWNTIME INTERRUPTION NOTE - TIME SYSTEM AVAILABLE AGAIN
Blood samples have been collected as prescribed while establishing IV access without any complications, and transferred to lab.    06-Oct-2018 10:10

## 2021-03-18 NOTE — ED ADULT TRIAGE NOTE - TEMPERATURE IN CELSIUS (DEGREES C)
Pt alert and oriented x4. Frequent repositioning. VSS. Oxygen at 6 L per NC. No complaints of n/v/d or pain. NPO. R wrist 22G flushing well. Infusing D5/.9 at 80 mL/hr. This AM, fluids changed to D5/.45, now infusing. Pt states this AM, \"I feel like I can't pee\". Bladder scan performed, only 113 mL retained. Will CTM nad notify MD of any changes. Emotional support provided. All meds given per MAR.   36.6

## 2024-01-03 NOTE — CHART NOTE - NSCHARTNOTESELECT_GEN_ALL_CORE
Call Center TCM Note      Flowsheet Row Responses   [facility] facility patient discharged from? [facility]   Does the patient have one of the following disease processes/diagnoses(primary or secondary)? Pneumonia   TCM attempt successful? No   Unsuccessful attempts Attempt 1            Joseline Aguila MA    1/3/2024, 11:18 EST         Nutrition Services
